# Patient Record
Sex: FEMALE | Race: WHITE | NOT HISPANIC OR LATINO | Employment: OTHER | URBAN - METROPOLITAN AREA
[De-identification: names, ages, dates, MRNs, and addresses within clinical notes are randomized per-mention and may not be internally consistent; named-entity substitution may affect disease eponyms.]

---

## 2017-01-31 ENCOUNTER — GENERIC CONVERSION - ENCOUNTER (OUTPATIENT)
Dept: OTHER | Facility: OTHER | Age: 82
End: 2017-01-31

## 2017-02-08 ENCOUNTER — GENERIC CONVERSION - ENCOUNTER (OUTPATIENT)
Dept: OTHER | Facility: OTHER | Age: 82
End: 2017-02-08

## 2017-02-21 ENCOUNTER — TRANSCRIBE ORDERS (OUTPATIENT)
Dept: RADIOLOGY | Facility: HOSPITAL | Age: 82
End: 2017-02-21

## 2017-02-21 ENCOUNTER — HOSPITAL ENCOUNTER (OUTPATIENT)
Dept: RADIOLOGY | Facility: HOSPITAL | Age: 82
Discharge: HOME | End: 2017-02-21
Payer: MEDICARE

## 2017-02-21 ENCOUNTER — ALLSCRIPTS OFFICE VISIT (OUTPATIENT)
Dept: OTHER | Facility: OTHER | Age: 82
End: 2017-02-21

## 2017-02-21 DIAGNOSIS — R50.9 FEVER: ICD-10-CM

## 2017-02-21 DIAGNOSIS — R05.9 COUGH: ICD-10-CM

## 2017-02-21 LAB
FLUAV AG SPEC QL IA: POSITIVE
INFLUENZA B AG (HISTORICAL): NEGATIVE

## 2017-02-21 PROCEDURE — 71020 HB CHEST X-RAY 2VW FRONTAL&LATL: CPT

## 2017-02-22 ENCOUNTER — GENERIC CONVERSION - ENCOUNTER (OUTPATIENT)
Dept: OTHER | Facility: OTHER | Age: 82
End: 2017-02-22

## 2017-03-06 ENCOUNTER — ALLSCRIPTS OFFICE VISIT (OUTPATIENT)
Dept: OTHER | Facility: OTHER | Age: 82
End: 2017-03-06

## 2017-03-20 ENCOUNTER — ALLSCRIPTS OFFICE VISIT (OUTPATIENT)
Dept: OTHER | Facility: OTHER | Age: 82
End: 2017-03-20

## 2017-05-13 ENCOUNTER — HOSPITAL ENCOUNTER (EMERGENCY)
Facility: HOSPITAL | Age: 82
Discharge: HOME | End: 2017-05-13
Attending: EMERGENCY MEDICINE
Payer: MEDICARE

## 2017-05-13 ENCOUNTER — GENERIC CONVERSION - ENCOUNTER (OUTPATIENT)
Dept: OTHER | Facility: OTHER | Age: 82
End: 2017-05-13

## 2017-05-13 VITALS
WEIGHT: 150 LBS | TEMPERATURE: 98 F | SYSTOLIC BLOOD PRESSURE: 197 MMHG | DIASTOLIC BLOOD PRESSURE: 82 MMHG | HEART RATE: 63 BPM | HEIGHT: 61 IN | RESPIRATION RATE: 18 BRPM | BODY MASS INDEX: 28.32 KG/M2 | OXYGEN SATURATION: 99 %

## 2017-05-13 DIAGNOSIS — I10 HYPERTENSION: Primary | ICD-10-CM

## 2017-05-13 LAB
ANION GAP SERPL CALCULATED.3IONS-SCNC: 8 MMOL/L (ref 4–13)
ATRIAL RATE: 73 BPM
BUN SERPL-MCNC: 20 MG/DL (ref 5–25)
CALCIUM SERPL-MCNC: 8.8 MG/DL (ref 8.3–10.1)
CHLORIDE SERPL-SCNC: 109 MMOL/L (ref 100–108)
CO2 SERPL-SCNC: 25 MMOL/L (ref 21–32)
CREAT SERPL-MCNC: 0.89 MG/DL (ref 0.6–1.3)
GFR SERPL CREATININE-BSD FRML MDRD: 59.5 ML/MIN/1.73SQ M
GLUCOSE SERPL-MCNC: 94 MG/DL (ref 65–140)
P AXIS: 58 DEGREES
POTASSIUM SERPL-SCNC: 3.6 MMOL/L (ref 3.5–5.3)
PR INTERVAL: 164 MS
QRS AXIS: -37 DEGREES
QRSD INTERVAL: 78 MS
QT INTERVAL: 408 MS
QTC INTERVAL: 449 MS
SODIUM SERPL-SCNC: 142 MMOL/L (ref 136–145)
T WAVE AXIS: 30 DEGREES
VENTRICULAR RATE: 73 BPM

## 2017-05-13 PROCEDURE — 93005 ELECTROCARDIOGRAM TRACING: CPT | Performed by: EMERGENCY MEDICINE

## 2017-05-13 PROCEDURE — 36415 COLL VENOUS BLD VENIPUNCTURE: CPT | Performed by: EMERGENCY MEDICINE

## 2017-05-13 PROCEDURE — 99282 EMERGENCY DEPT VISIT SF MDM: CPT

## 2017-05-13 PROCEDURE — 80048 BASIC METABOLIC PNL TOTAL CA: CPT | Performed by: EMERGENCY MEDICINE

## 2017-05-13 RX ORDER — LISINOPRIL 20 MG/1
20 TABLET ORAL ONCE
Status: COMPLETED | OUTPATIENT
Start: 2017-05-13 | End: 2017-05-13

## 2017-05-13 RX ADMIN — LISINOPRIL 20 MG: 20 TABLET ORAL at 17:02

## 2017-05-15 ENCOUNTER — ALLSCRIPTS OFFICE VISIT (OUTPATIENT)
Dept: OTHER | Facility: OTHER | Age: 82
End: 2017-05-15

## 2017-05-15 DIAGNOSIS — I10 ESSENTIAL (PRIMARY) HYPERTENSION: ICD-10-CM

## 2017-05-15 DIAGNOSIS — R53.83 OTHER FATIGUE: ICD-10-CM

## 2017-09-25 ENCOUNTER — ALLSCRIPTS OFFICE VISIT (OUTPATIENT)
Dept: OTHER | Facility: OTHER | Age: 82
End: 2017-09-25

## 2017-09-25 ENCOUNTER — TRANSCRIBE ORDERS (OUTPATIENT)
Dept: LAB | Facility: HOSPITAL | Age: 82
End: 2017-09-25

## 2017-09-25 ENCOUNTER — GENERIC CONVERSION - ENCOUNTER (OUTPATIENT)
Dept: OTHER | Facility: OTHER | Age: 82
End: 2017-09-25

## 2017-09-25 ENCOUNTER — APPOINTMENT (OUTPATIENT)
Dept: LAB | Facility: HOSPITAL | Age: 82
End: 2017-09-25
Payer: MEDICARE

## 2017-09-25 DIAGNOSIS — E78.5 HYPERLIPIDEMIA: ICD-10-CM

## 2017-09-25 DIAGNOSIS — E55.9 VITAMIN D DEFICIENCY: ICD-10-CM

## 2017-09-25 DIAGNOSIS — R73.01 IMPAIRED FASTING GLUCOSE: ICD-10-CM

## 2017-09-25 DIAGNOSIS — M85.80 OTHER SPECIFIED DISORDERS OF BONE DENSITY AND STRUCTURE, UNSPECIFIED SITE: ICD-10-CM

## 2017-09-25 DIAGNOSIS — I10 ESSENTIAL (PRIMARY) HYPERTENSION: ICD-10-CM

## 2017-09-25 LAB
25(OH)D3 SERPL-MCNC: 34.4 NG/ML (ref 30–100)
ALBUMIN SERPL BCP-MCNC: 3.3 G/DL (ref 3.5–5)
ALP SERPL-CCNC: 99 U/L (ref 46–116)
ALT SERPL W P-5'-P-CCNC: 18 U/L (ref 12–78)
ANION GAP SERPL CALCULATED.3IONS-SCNC: 5 MMOL/L (ref 4–13)
AST SERPL W P-5'-P-CCNC: 21 U/L (ref 5–45)
BASOPHILS # BLD AUTO: 0.03 THOUSANDS/ΜL (ref 0–0.1)
BASOPHILS NFR BLD AUTO: 0 % (ref 0–1)
BILIRUB SERPL-MCNC: 0.8 MG/DL (ref 0.2–1)
BUN SERPL-MCNC: 19 MG/DL (ref 5–25)
CALCIUM SERPL-MCNC: 8.8 MG/DL (ref 8.3–10.1)
CHLORIDE SERPL-SCNC: 110 MMOL/L (ref 100–108)
CO2 SERPL-SCNC: 27 MMOL/L (ref 21–32)
CREAT SERPL-MCNC: 0.92 MG/DL (ref 0.6–1.3)
EOSINOPHIL # BLD AUTO: 0.12 THOUSAND/ΜL (ref 0–0.61)
EOSINOPHIL NFR BLD AUTO: 2 % (ref 0–6)
ERYTHROCYTE [DISTWIDTH] IN BLOOD BY AUTOMATED COUNT: 12.8 % (ref 11.6–15.1)
GFR SERPL CREATININE-BSD FRML MDRD: 55 ML/MIN/1.73SQ M
GLUCOSE SERPL-MCNC: 84 MG/DL (ref 65–140)
HCT VFR BLD AUTO: 42.3 % (ref 34.8–46.1)
HGB BLD-MCNC: 14 G/DL (ref 11.5–15.4)
LYMPHOCYTES # BLD AUTO: 1.61 THOUSANDS/ΜL (ref 0.6–4.47)
LYMPHOCYTES NFR BLD AUTO: 24 % (ref 14–44)
MCH RBC QN AUTO: 33.8 PG (ref 26.8–34.3)
MCHC RBC AUTO-ENTMCNC: 33.1 G/DL (ref 31.4–37.4)
MCV RBC AUTO: 102 FL (ref 82–98)
MONOCYTES # BLD AUTO: 0.37 THOUSAND/ΜL (ref 0.17–1.22)
MONOCYTES NFR BLD AUTO: 6 % (ref 4–12)
NEUTROPHILS # BLD AUTO: 4.57 THOUSANDS/ΜL (ref 1.85–7.62)
NEUTS SEG NFR BLD AUTO: 68 % (ref 43–75)
NRBC BLD AUTO-RTO: 0 /100 WBCS
PLATELET # BLD AUTO: 328 THOUSANDS/UL (ref 149–390)
PMV BLD AUTO: 9.5 FL (ref 8.9–12.7)
POTASSIUM SERPL-SCNC: 4.3 MMOL/L (ref 3.5–5.3)
PROT SERPL-MCNC: 6.5 G/DL (ref 6.4–8.2)
RBC # BLD AUTO: 4.14 MILLION/UL (ref 3.81–5.12)
SODIUM SERPL-SCNC: 142 MMOL/L (ref 136–145)
TSH SERPL DL<=0.05 MIU/L-ACNC: 1.75 UIU/ML (ref 0.36–3.74)
WBC # BLD AUTO: 6.71 THOUSAND/UL (ref 4.31–10.16)

## 2017-09-25 PROCEDURE — 82306 VITAMIN D 25 HYDROXY: CPT

## 2017-09-25 PROCEDURE — 84443 ASSAY THYROID STIM HORMONE: CPT

## 2017-09-25 PROCEDURE — 36415 COLL VENOUS BLD VENIPUNCTURE: CPT

## 2017-09-25 PROCEDURE — 80053 COMPREHEN METABOLIC PANEL: CPT

## 2017-09-25 PROCEDURE — 85025 COMPLETE CBC W/AUTO DIFF WBC: CPT

## 2017-10-24 ENCOUNTER — GENERIC CONVERSION - ENCOUNTER (OUTPATIENT)
Dept: FAMILY MEDICINE CLINIC | Facility: CLINIC | Age: 82
End: 2017-10-24

## 2017-12-09 ENCOUNTER — APPOINTMENT (EMERGENCY)
Dept: RADIOLOGY | Facility: HOSPITAL | Age: 82
DRG: 871 | End: 2017-12-09
Payer: MEDICARE

## 2017-12-09 ENCOUNTER — GENERIC CONVERSION - ENCOUNTER (OUTPATIENT)
Dept: CARDIOLOGY CLINIC | Facility: CLINIC | Age: 82
End: 2017-12-09

## 2017-12-09 ENCOUNTER — HOSPITAL ENCOUNTER (INPATIENT)
Facility: HOSPITAL | Age: 82
LOS: 1 days | Discharge: HOME | DRG: 871 | End: 2017-12-11
Attending: EMERGENCY MEDICINE | Admitting: INTERNAL MEDICINE
Payer: MEDICARE

## 2017-12-09 DIAGNOSIS — I48.91 NEW ONSET ATRIAL FIBRILLATION (HCC): ICD-10-CM

## 2017-12-09 DIAGNOSIS — J18.9 COMMUNITY ACQUIRED PNEUMONIA OF RIGHT UPPER LOBE OF LUNG: Primary | ICD-10-CM

## 2017-12-09 PROBLEM — R07.81 PLEURITIC CHEST PAIN: Status: ACTIVE | Noted: 2017-12-09

## 2017-12-09 PROBLEM — I31.3 PERICARDIAL EFFUSION: Status: ACTIVE | Noted: 2017-12-09

## 2017-12-09 PROBLEM — I31.39 PERICARDIAL EFFUSION: Status: ACTIVE | Noted: 2017-12-09

## 2017-12-09 PROBLEM — I10 HYPERTENSION: Status: ACTIVE | Noted: 2017-12-09

## 2017-12-09 PROBLEM — N17.9 AKI (ACUTE KIDNEY INJURY) (HCC): Status: ACTIVE | Noted: 2017-12-09

## 2017-12-09 PROBLEM — A41.9 SEPSIS (HCC): Status: ACTIVE | Noted: 2017-12-09

## 2017-12-09 LAB
ANION GAP SERPL CALCULATED.3IONS-SCNC: 9 MMOL/L (ref 4–13)
ANISOCYTOSIS BLD QL SMEAR: PRESENT
BASOPHILS # BLD MANUAL: 0.32 THOUSAND/UL (ref 0–0.1)
BASOPHILS NFR MAR MANUAL: 2 % (ref 0–1)
BUN SERPL-MCNC: 22 MG/DL (ref 5–25)
CALCIUM SERPL-MCNC: 9.3 MG/DL (ref 8.3–10.1)
CHLORIDE SERPL-SCNC: 108 MMOL/L (ref 100–108)
CO2 SERPL-SCNC: 25 MMOL/L (ref 21–32)
CREAT SERPL-MCNC: 1.14 MG/DL (ref 0.6–1.3)
DEPRECATED D DIMER PPP: 2412 NG/ML (FEU) (ref 0–424)
EOSINOPHIL # BLD MANUAL: 0 THOUSAND/UL (ref 0–0.4)
EOSINOPHIL NFR BLD MANUAL: 0 % (ref 0–6)
ERYTHROCYTE [DISTWIDTH] IN BLOOD BY AUTOMATED COUNT: 13 % (ref 11.6–15.1)
GFR SERPL CREATININE-BSD FRML MDRD: 42 ML/MIN/1.73SQ M
GLUCOSE SERPL-MCNC: 115 MG/DL (ref 65–140)
HCT VFR BLD AUTO: 42.3 % (ref 34.8–46.1)
HGB BLD-MCNC: 14.6 G/DL (ref 11.5–15.4)
LACTATE SERPL-SCNC: 1.2 MMOL/L (ref 0.5–2)
LYMPHOCYTES # BLD AUTO: 0 % (ref 14–44)
LYMPHOCYTES # BLD AUTO: 0 THOUSAND/UL (ref 0.6–4.47)
MACROCYTES BLD QL AUTO: PRESENT
MCH RBC QN AUTO: 34.4 PG (ref 26.8–34.3)
MCHC RBC AUTO-ENTMCNC: 34.5 G/DL (ref 31.4–37.4)
MCV RBC AUTO: 100 FL (ref 82–98)
MONOCYTES # BLD AUTO: 0.8 THOUSAND/UL (ref 0–1.22)
MONOCYTES NFR BLD: 5 % (ref 4–12)
NEUTROPHILS # BLD MANUAL: 14.92 THOUSAND/UL (ref 1.85–7.62)
NEUTS SEG NFR BLD AUTO: 93 % (ref 43–75)
NRBC BLD AUTO-RTO: 0 /100 WBCS
PLATELET # BLD AUTO: 274 THOUSANDS/UL (ref 149–390)
PLATELET # BLD AUTO: 283 THOUSANDS/UL (ref 149–390)
PLATELET BLD QL SMEAR: ADEQUATE
PMV BLD AUTO: 9.1 FL (ref 8.9–12.7)
PMV BLD AUTO: 9.1 FL (ref 8.9–12.7)
POTASSIUM SERPL-SCNC: 4.2 MMOL/L (ref 3.5–5.3)
RBC # BLD AUTO: 4.24 MILLION/UL (ref 3.81–5.12)
RBC MORPH BLD: PRESENT
SODIUM SERPL-SCNC: 142 MMOL/L (ref 136–145)
SPECIMEN SOURCE: NORMAL
TROPONIN I BLD-MCNC: 0 NG/ML (ref 0–0.08)
TROPONIN I SERPL-MCNC: <0.02 NG/ML
WBC # BLD AUTO: 16.04 THOUSAND/UL (ref 4.31–10.16)

## 2017-12-09 PROCEDURE — 96360 HYDRATION IV INFUSION INIT: CPT

## 2017-12-09 PROCEDURE — 83605 ASSAY OF LACTIC ACID: CPT | Performed by: EMERGENCY MEDICINE

## 2017-12-09 PROCEDURE — 36415 COLL VENOUS BLD VENIPUNCTURE: CPT | Performed by: EMERGENCY MEDICINE

## 2017-12-09 PROCEDURE — 99285 EMERGENCY DEPT VISIT HI MDM: CPT

## 2017-12-09 PROCEDURE — 71020 HB CHEST X-RAY 2VW FRONTAL&LATL: CPT

## 2017-12-09 PROCEDURE — 71275 CT ANGIOGRAPHY CHEST: CPT

## 2017-12-09 PROCEDURE — 84484 ASSAY OF TROPONIN QUANT: CPT

## 2017-12-09 PROCEDURE — 93005 ELECTROCARDIOGRAM TRACING: CPT | Performed by: EMERGENCY MEDICINE

## 2017-12-09 PROCEDURE — 80048 BASIC METABOLIC PNL TOTAL CA: CPT | Performed by: EMERGENCY MEDICINE

## 2017-12-09 PROCEDURE — 87040 BLOOD CULTURE FOR BACTERIA: CPT | Performed by: EMERGENCY MEDICINE

## 2017-12-09 PROCEDURE — 84484 ASSAY OF TROPONIN QUANT: CPT | Performed by: PHYSICIAN ASSISTANT

## 2017-12-09 PROCEDURE — 85379 FIBRIN DEGRADATION QUANT: CPT | Performed by: EMERGENCY MEDICINE

## 2017-12-09 PROCEDURE — 85049 AUTOMATED PLATELET COUNT: CPT | Performed by: PHYSICIAN ASSISTANT

## 2017-12-09 PROCEDURE — 85027 COMPLETE CBC AUTOMATED: CPT | Performed by: EMERGENCY MEDICINE

## 2017-12-09 PROCEDURE — 85007 BL SMEAR W/DIFF WBC COUNT: CPT | Performed by: EMERGENCY MEDICINE

## 2017-12-09 RX ORDER — HEPARIN SODIUM 5000 [USP'U]/ML
5000 INJECTION, SOLUTION INTRAVENOUS; SUBCUTANEOUS EVERY 8 HOURS SCHEDULED
Status: DISCONTINUED | OUTPATIENT
Start: 2017-12-09 | End: 2017-12-11 | Stop reason: HOSPADM

## 2017-12-09 RX ORDER — AZITHROMYCIN 250 MG/1
250 TABLET, FILM COATED ORAL EVERY 24 HOURS
Status: DISCONTINUED | OUTPATIENT
Start: 2017-12-10 | End: 2017-12-11

## 2017-12-09 RX ORDER — SODIUM CHLORIDE 9 MG/ML
100 INJECTION, SOLUTION INTRAVENOUS CONTINUOUS
Status: DISCONTINUED | OUTPATIENT
Start: 2017-12-09 | End: 2017-12-10

## 2017-12-09 RX ORDER — ACETAMINOPHEN 325 MG/1
975 TABLET ORAL EVERY 8 HOURS SCHEDULED
Status: DISCONTINUED | OUTPATIENT
Start: 2017-12-09 | End: 2017-12-11 | Stop reason: HOSPADM

## 2017-12-09 RX ORDER — CALCIUM CARBONATE 200(500)MG
1000 TABLET,CHEWABLE ORAL DAILY PRN
Status: DISCONTINUED | OUTPATIENT
Start: 2017-12-09 | End: 2017-12-11 | Stop reason: HOSPADM

## 2017-12-09 RX ORDER — LISINOPRIL 20 MG/1
20 TABLET ORAL DAILY
COMMUNITY
End: 2018-01-31 | Stop reason: ALTCHOICE

## 2017-12-09 RX ORDER — OMEGA-3-ACID ETHYL ESTERS 1 G/1
2 CAPSULE, LIQUID FILLED ORAL 2 TIMES DAILY
COMMUNITY
End: 2019-10-24 | Stop reason: ALTCHOICE

## 2017-12-09 RX ORDER — DOCUSATE SODIUM 100 MG/1
100 CAPSULE, LIQUID FILLED ORAL 2 TIMES DAILY
Status: DISCONTINUED | OUTPATIENT
Start: 2017-12-09 | End: 2017-12-11 | Stop reason: HOSPADM

## 2017-12-09 RX ORDER — ONDANSETRON 2 MG/ML
4 INJECTION INTRAMUSCULAR; INTRAVENOUS EVERY 6 HOURS PRN
Status: DISCONTINUED | OUTPATIENT
Start: 2017-12-09 | End: 2017-12-11 | Stop reason: HOSPADM

## 2017-12-09 RX ORDER — CHLORAL HYDRATE 500 MG
1000 CAPSULE ORAL DAILY
Status: DISCONTINUED | OUTPATIENT
Start: 2017-12-10 | End: 2017-12-11 | Stop reason: HOSPADM

## 2017-12-09 RX ADMIN — HEPARIN SODIUM 5000 UNITS: 5000 INJECTION, SOLUTION INTRAVENOUS; SUBCUTANEOUS at 22:53

## 2017-12-09 RX ADMIN — CEFTRIAXONE 1000 MG: 1 INJECTION, SOLUTION INTRAVENOUS at 19:14

## 2017-12-09 RX ADMIN — AZITHROMYCIN MONOHYDRATE 500 MG: 500 INJECTION, POWDER, LYOPHILIZED, FOR SOLUTION INTRAVENOUS at 20:05

## 2017-12-09 RX ADMIN — ACETAMINOPHEN 975 MG: 325 TABLET, FILM COATED ORAL at 22:53

## 2017-12-09 RX ADMIN — SODIUM CHLORIDE 1000 ML: 0.9 INJECTION, SOLUTION INTRAVENOUS at 17:32

## 2017-12-09 RX ADMIN — IOHEXOL 70 ML: 350 INJECTION, SOLUTION INTRAVENOUS at 17:54

## 2017-12-09 RX ADMIN — BIMATOPROST 1 DROP: 0.1 SOLUTION/ DROPS OPHTHALMIC at 22:59

## 2017-12-09 RX ADMIN — SODIUM CHLORIDE 100 ML/HR: 0.9 INJECTION, SOLUTION INTRAVENOUS at 22:53

## 2017-12-09 RX ADMIN — DOCUSATE SODIUM 100 MG: 100 CAPSULE, LIQUID FILLED ORAL at 22:53

## 2017-12-09 NOTE — ED ATTENDING ATTESTATION
Windy Machado MD, saw and evaluated the patient  I have discussed the patient with the resident/non-physician practitioner and agree with the resident's/non-physician practitioner's findings, Plan of Care, and MDM as documented in the resident's/non-physician practitioner's note, except where noted  All available labs and Radiology studies were reviewed  At this point I agree with the current assessment done in the Emergency Department  I have conducted an independent evaluation of this patient a history and physical is as follows: This is a 24-year-old woman who presents to the emergency department with pleuritic chest pain  The patient states that her pain began yesterday  She states it only hurts when she takes a deep breath  She states she has not had cough, fevers, or shortness of breath  She does not have chest pain when she is not taking a deep breath  She had symptoms like this 1 year ago, and came to the hospital, but does not remember what they told her  The patient has a history of hypercholesterolemia and hypertension  She does not believe that she has ever had a stress test   She has never had an MI  The patient has no personal history of malignancy, family history of thromboembolic disease, lateralizing leg swelling, recent travel, recent surgery, or leg pain  Her review of systems is otherwise negative in 12 systems were reviewed  On exam Vital signs were reviewed  Patient is awake, alert, interactive  The patient's pupils are equally round reactive to light  Oropharynx is clear with moist mucous membranes  Neck is supple and nontender with no adenopathy or JVD  Heart is regular with no murmurs, rubs, or gallops  Lungs are clear and equal with no wheezes, rales, or rhonchi  Abdomen is soft and nontender with no masses, rebound, or guarding  There is no CVA tenderness  The patient was completely exposed  There is no skin breakdown    There are no rashes or skin changes  Extremities are warm and well perfused with good pulses  The patient has normal strength, sensation, and cranial nerves  Patient's EKG was reviewed by me and shows sinus rhythm with no ischemia or ectopy  Impression:  Atypical chest pain  Patient has a heart score of 3  Will perform delta troponin, shared decision making  Additionally, pleuritic pain mildly suspicious for pulmonary embolism, although patient is low risk and has no risk factors  She denies shortness of breath  However, given her age, we will plan to do a D-dimer and use age adjusted D-dimer levels to rule out pulmonary embolus      Critical Care Time  CritCare Time

## 2017-12-09 NOTE — ED PROVIDER NOTES
History  Chief Complaint   Patient presents with    Chest Pain     Pt c/o chest pain upon taking deep breath and trouble breathing which started yesterday     80year-old with history of hypertension, hyperlipidemia presents for evaluation of chest discomfort  Patient reports having 2 days of discomfort in her chest with deep inspiration  Denies having any overt chest pain or shortness of breath  Denies any dyspnea with exertion, fever, chills, diaphoresis or vomiting  Reports having the symptoms about 1 year ago which resolved on their own  Denies any history of recent travel, surgeries or unilateral swelling in her lower extremities  Prior to Admission Medications   Prescriptions Last Dose Informant Patient Reported? Taking? Multiple Vitamins-Minerals (ICAPS MV PO)   Yes Yes   Sig: Take by mouth   lisinopril (ZESTRIL) 20 mg tablet   Yes Yes   Sig: Take 20 mg by mouth daily   omega-3-acid ethyl esters (LOVAZA) 1 g capsule   Yes Yes   Sig: Take 2 g by mouth 2 (two) times a day      Facility-Administered Medications: None       Past Medical History:   Diagnosis Date    Hyperlipidemia     Hypertension     Macular degeneration        Past Surgical History:   Procedure Laterality Date    HYSTERECTOMY         History reviewed  No pertinent family history  I have reviewed and agree with the history as documented  Social History   Substance Use Topics    Smoking status: Never Smoker    Smokeless tobacco: Never Used    Alcohol use Yes        Review of Systems   Constitutional: Negative for chills and fever  HENT: Negative for congestion and sore throat  Eyes: Negative for pain and redness  Respiratory: Negative for shortness of breath and wheezing  Cardiovascular: Positive for chest pain  Negative for palpitations  Gastrointestinal: Negative for abdominal pain, diarrhea and vomiting  Endocrine: Negative for polydipsia and polyphagia     Genitourinary: Negative for dysuria and flank pain    Musculoskeletal: Negative for arthralgias and back pain  Skin: Negative for rash and wound  Neurological: Negative for seizures and headaches  Psychiatric/Behavioral: Negative for agitation and behavioral problems  All other systems reviewed and are negative  Physical Exam  ED Triage Vitals [12/09/17 1549]   Temperature Pulse Respirations Blood Pressure SpO2   (!) 95 5 °F (35 3 °C) 88 18 129/60 100 %      Temp Source Heart Rate Source Patient Position - Orthostatic VS BP Location FiO2 (%)   Tympanic Monitor Sitting Left arm --      Pain Score       7           Orthostatic Vital Signs  Vitals:    12/09/17 1549 12/09/17 1615 12/09/17 1729 12/09/17 1838   BP: 129/60 (!) 129/105 (!) 129/105 132/61   Pulse: 88 83 78 78   Patient Position - Orthostatic VS: Sitting Lying Lying Lying       Physical Exam   Constitutional: She is oriented to person, place, and time  She appears well-developed and well-nourished  HENT:   Head: Normocephalic and atraumatic  Right Ear: External ear normal    Left Ear: External ear normal    Mouth/Throat: Oropharynx is clear and moist    Eyes: EOM are normal  Pupils are equal, round, and reactive to light  Neck: Normal range of motion  Cardiovascular: Normal rate, regular rhythm and normal heart sounds  Exam reveals no friction rub  No murmur heard  Pulmonary/Chest: Effort normal  No respiratory distress  She has no wheezes  Abdominal: Soft  Bowel sounds are normal  She exhibits no distension  There is no tenderness  There is no rebound and no guarding  Musculoskeletal: Normal range of motion  She exhibits no edema  Neurological: She is alert and oriented to person, place, and time  No cranial nerve deficit  Coordination normal    Skin: Skin is warm  No erythema  Psychiatric: She has a normal mood and affect  Her behavior is normal    Nursing note and vitals reviewed        ED Medications  Medications   sodium chloride 0 9 % bolus 1,000 mL (1,000 mL Intravenous New Bag 12/9/17 1732)   azithromycin (ZITHROMAX) 500 mg in sodium chloride 0 9% 250mL IVPB 500 mg (not administered)   cefTRIAXone (ROCEPHIN) IVPB (premix) 1,000 mg (not administered)   iohexol (OMNIPAQUE) 350 MG/ML injection (MULTI-DOSE) 85 mL (70 mL Intravenous Given 12/9/17 9963)       Diagnostic Studies  Results Reviewed     Procedure Component Value Units Date/Time    Lactic acid, plasma [[id number]]     Lab Status:  No result Specimen:  Blood     Blood culture #1 [[id number]]     Lab Status:  No result Specimen:  Blood     Blood culture #2 [[id number]]     Lab Status:  No result Specimen:  Blood     Basic metabolic panel [[id number]] Collected:  12/09/17 1620    Lab Status:  Final result Specimen:  Blood from Arm, Left Updated:  12/09/17 1716     Sodium 142 mmol/L      Potassium 4 2 mmol/L      Chloride 108 mmol/L      CO2 25 mmol/L      Anion Gap 9 mmol/L      BUN 22 mg/dL      Creatinine 1 14 mg/dL      Glucose 115 mg/dL      Calcium 9 3 mg/dL      eGFR 42 ml/min/1 73sq m     Narrative:         National Kidney Disease Education Program recommendations are as follows:  GFR calculation is accurate only with a steady state creatinine  Chronic Kidney disease less than 60 ml/min/1 73 sq  meters  Kidney failure less than 15 ml/min/1 73 sq  meters  CBC and differential [[id number]]  (Abnormal) Collected:  12/09/17 1620    Lab Status:  Final result Specimen:  Blood from Arm, Left Updated:  12/09/17 1706     WBC 16 04 (H) Thousand/uL      RBC 4 24 Million/uL      Hemoglobin 14 6 g/dL      Hematocrit 42 3 %       (H) fL      MCH 34 4 (H) pg      MCHC 34 5 g/dL      RDW 13 0 %      MPV 9 1 fL      Platelets 234 Thousands/uL      nRBC 0 /100 WBCs     Narrative: This is an appended report  These results have been appended to a previously verified report      D-Dimer [[id number]]  (Abnormal) Collected:  12/09/17 1620    Lab Status:  Final result Specimen:  Blood from Arm, Left Updated:  12/09/17 1486 D-Dimer, Quant 2,412 (H) ng/ml (FEU)     POCT troponin [[id number]]  (Normal) Collected:  12/09/17 1625    Lab Status:  Final result Updated:  12/09/17 1639     POC Troponin I 0 00 ng/ml      Specimen Type VENOUS    Narrative:         [employer] i-Stat handheld analyzer 99% cutoff is > 0 08ng/mL in network Emergency Departments    o cTnI 99% cutoff is useful only when applied to patients in the clinical setting of myocardial ischemia  o cTnI 99% cutoff should be interpreted in the context of clinical history, ECG findings and possibly cardiac imaging to establish correct diagnosis  o cTnI 99% cutoff may be suggestive but clearly not indicative of a coronary event without the clinical setting of myocardial ischemia  CTA ED chest PE study   Final Result by Kyle Melendez MD (12/09 1835)      1  No evidence of pulmonary embolism  2   Small to moderate-sized pericardial effusion  Trace left pleural effusion  3  Mild patchy opacity in the right upper lobe and mild patchy consolidation at the left lung base which may represent atelectasis or infiltrate  Mild opacification of the bronchioles in the right lower lobe  4   Multiple pulmonary nodules more numerous in the upper lobes  The largest measures up to 1 cm  Based on current Fleischner Society 2017 Guidelines on incidental pulmonary nodule, either PET/CT scan evaluation, tissue sampling or short term interval    followup non-contrast CT followup (initially in 3 months) may be considered appropriate           Workstation performed: [id number]         XR chest 2 views   ED Interpretation by Romulo Bhagat MD (12/09 0259)   No acute findings            Procedures  ECG 12 Lead Documentation  Date/Time: 12/9/2017 4:10 PM  Performed by: Bridgett Cuff  Authorized by: Luann Giron     Indications / Diagnosis:  Chest pain  ECG reviewed by me, the ED Provider: yes    Patient location:  ED  Previous ECG:     Previous ECG:  Compared to current Comparison ECG info:  5/2017    Similarity:  No change    Comparison to cardiac monitor: Yes    Interpretation:     Interpretation: normal    Rate:     ECG rate:  91    ECG rate assessment: normal    Rhythm:     Rhythm: sinus rhythm    Ectopy:     Ectopy: none    QRS:     QRS axis:  Normal    QRS intervals:  Normal  Conduction:     Conduction: normal    ST segments:     ST segments:  Normal  T waves:     T waves: normal            Phone Consults  ED Phone Contact    ED Course  ED Course          HEART Risk Score    Flowsheet Row Most Recent Value   History  0 Filed at: 12/09/2017 1607   ECG  0 Filed at: 12/09/2017 1607   Age  2 Filed at: 12/09/2017 1607   Risk Factors  1 Filed at: 12/09/2017 1607   Troponin  0 Filed at: 12/09/2017 1607   Heart Score Risk Calculator   History  0 Filed at: 12/09/2017 1607   ECG  0 Filed at: 12/09/2017 1607   Age  2 Filed at: 12/09/2017 1607   Risk Factors  1 Filed at: 12/09/2017 1607   Troponin  0 Filed at: 12/09/2017 1607   HEART Score  3 Filed at: 12/09/2017 1607   HEART Score  3 Filed at: 12/09/2017 1607        Identification of Seniors at [address] [place] [address] Most Recent Value   (ISAR) Identification of Seniors at Risk   Before the illness or injury that brought you to the Emergency, did you need someone to help you on a regular basis? 0 Filed at: 12/09/2017 1551   In the last 24 hours, have you needed more help than usual?  0 Filed at: 12/09/2017 1551   Have you been hospitalized for one or more nights during the past 6 months? 0 Filed at: 12/09/2017 1551   In general, do you see well? 1 Filed at: 12/09/2017 1551   In general, do you have serious problems with your memory?   0 Filed at: 12/09/2017 1551   Do you take more than three different medications every day?  0 Filed at: 12/09/2017 1551   ISAR Score  1 Filed at: 12/09/2017 1551                          MDM  Number of Diagnoses or Management Options  Diagnosis management comments: Impression:  Chest discomfort with deep inspiration  Unlikely ACS, low risk for PE  Plan:  Cardiac evaluation with delta troponin and D-dimer, age adjusted    CritCare Time    Disposition  Final diagnoses:   Community acquired pneumonia of right upper lobe of lung ([address] )     Time reflects when diagnosis was documented in both MDM as applicable and the Disposition within this note     Time User Action Codes Description Comment    12/9/2017  7:01 PM Sherine Bagley Add [J18 1] Community acquired pneumonia of right upper lobe of lung [facility])       ED Disposition     ED Disposition Condition Comment    Admit  Case was discussed with JV and the patient's admission status was agreed to be Admission Status: observation status to the service of Dr Dat Cox   Follow-up Information    None       Patient's Medications   Discharge Prescriptions    No medications on file     No discharge procedures on file  ED Provider  Attending physically available and evaluated García Perez I managed the patient along with the ED Attending      Electronically Signed by         Frantz Oropeza MD  Resident  12/09/17 7369

## 2017-12-10 ENCOUNTER — GENERIC CONVERSION - ENCOUNTER (OUTPATIENT)
Dept: CARDIOLOGY CLINIC | Facility: CLINIC | Age: 82
End: 2017-12-10

## 2017-12-10 ENCOUNTER — APPOINTMENT (OUTPATIENT)
Dept: NON INVASIVE DIAGNOSTICS | Facility: HOSPITAL | Age: 82
DRG: 871 | End: 2017-12-10
Payer: MEDICARE

## 2017-12-10 LAB
ANION GAP SERPL CALCULATED.3IONS-SCNC: 6 MMOL/L (ref 4–13)
ATRIAL RATE: 66 BPM
ATRIAL RATE: 91 BPM
BASOPHILS # BLD AUTO: 0.01 THOUSANDS/ΜL (ref 0–0.1)
BASOPHILS NFR BLD AUTO: 0 % (ref 0–1)
BUN SERPL-MCNC: 22 MG/DL (ref 5–25)
CALCIUM SERPL-MCNC: 8.1 MG/DL (ref 8.3–10.1)
CHLORIDE SERPL-SCNC: 112 MMOL/L (ref 100–108)
CO2 SERPL-SCNC: 24 MMOL/L (ref 21–32)
CREAT SERPL-MCNC: 0.94 MG/DL (ref 0.6–1.3)
EOSINOPHIL # BLD AUTO: 0.02 THOUSAND/ΜL (ref 0–0.61)
EOSINOPHIL NFR BLD AUTO: 0 % (ref 0–6)
ERYTHROCYTE [DISTWIDTH] IN BLOOD BY AUTOMATED COUNT: 13.3 % (ref 11.6–15.1)
GFR SERPL CREATININE-BSD FRML MDRD: 53 ML/MIN/1.73SQ M
GLUCOSE P FAST SERPL-MCNC: 89 MG/DL (ref 65–99)
GLUCOSE SERPL-MCNC: 89 MG/DL (ref 65–140)
HCT VFR BLD AUTO: 34.1 % (ref 34.8–46.1)
HGB BLD-MCNC: 11.6 G/DL (ref 11.5–15.4)
L PNEUMO1 AG UR QL IA.RAPID: NEGATIVE
LYMPHOCYTES # BLD AUTO: 1.26 THOUSANDS/ΜL (ref 0.6–4.47)
LYMPHOCYTES NFR BLD AUTO: 14 % (ref 14–44)
MCH RBC QN AUTO: 34 PG (ref 26.8–34.3)
MCHC RBC AUTO-ENTMCNC: 34 G/DL (ref 31.4–37.4)
MCV RBC AUTO: 100 FL (ref 82–98)
MONOCYTES # BLD AUTO: 0.8 THOUSAND/ΜL (ref 0.17–1.22)
MONOCYTES NFR BLD AUTO: 9 % (ref 4–12)
NEUTROPHILS # BLD AUTO: 6.72 THOUSANDS/ΜL (ref 1.85–7.62)
NEUTS SEG NFR BLD AUTO: 77 % (ref 43–75)
NRBC BLD AUTO-RTO: 0 /100 WBCS
P AXIS: 42 DEGREES
PLATELET # BLD AUTO: 227 THOUSANDS/UL (ref 149–390)
PMV BLD AUTO: 9 FL (ref 8.9–12.7)
POTASSIUM SERPL-SCNC: 4 MMOL/L (ref 3.5–5.3)
PR INTERVAL: 148 MS
QRS AXIS: 1 DEGREES
QRS AXIS: 6 DEGREES
QRSD INTERVAL: 72 MS
QRSD INTERVAL: 92 MS
QT INTERVAL: 284 MS
QT INTERVAL: 432 MS
QTC INTERVAL: 409 MS
QTC INTERVAL: 452 MS
RBC # BLD AUTO: 3.41 MILLION/UL (ref 3.81–5.12)
S PNEUM AG UR QL: NEGATIVE
SODIUM SERPL-SCNC: 142 MMOL/L (ref 136–145)
T WAVE AXIS: 44 DEGREES
T WAVE AXIS: 52 DEGREES
TROPONIN I SERPL-MCNC: <0.02 NG/ML
VENTRICULAR RATE: 125 BPM
VENTRICULAR RATE: 66 BPM
WBC # BLD AUTO: 8.84 THOUSAND/UL (ref 4.31–10.16)

## 2017-12-10 PROCEDURE — 93005 ELECTROCARDIOGRAM TRACING: CPT | Performed by: PHYSICIAN ASSISTANT

## 2017-12-10 PROCEDURE — 93005 ELECTROCARDIOGRAM TRACING: CPT

## 2017-12-10 PROCEDURE — 85025 COMPLETE CBC W/AUTO DIFF WBC: CPT | Performed by: PHYSICIAN ASSISTANT

## 2017-12-10 PROCEDURE — 87449 NOS EACH ORGANISM AG IA: CPT | Performed by: PHYSICIAN ASSISTANT

## 2017-12-10 PROCEDURE — 80048 BASIC METABOLIC PNL TOTAL CA: CPT | Performed by: PHYSICIAN ASSISTANT

## 2017-12-10 PROCEDURE — 93306 TTE W/DOPPLER COMPLETE: CPT

## 2017-12-10 PROCEDURE — 84484 ASSAY OF TROPONIN QUANT: CPT | Performed by: PHYSICIAN ASSISTANT

## 2017-12-10 RX ORDER — METOPROLOL TARTRATE 5 MG/5ML
5 INJECTION INTRAVENOUS EVERY 6 HOURS PRN
Status: DISCONTINUED | OUTPATIENT
Start: 2017-12-10 | End: 2017-12-11 | Stop reason: HOSPADM

## 2017-12-10 RX ORDER — ASPIRIN 81 MG/1
81 TABLET, CHEWABLE ORAL DAILY
Status: DISCONTINUED | OUTPATIENT
Start: 2017-12-11 | End: 2017-12-11 | Stop reason: HOSPADM

## 2017-12-10 RX ORDER — METOPROLOL TARTRATE 5 MG/5ML
5 INJECTION INTRAVENOUS ONCE
Status: COMPLETED | OUTPATIENT
Start: 2017-12-10 | End: 2017-12-10

## 2017-12-10 RX ADMIN — HEPARIN SODIUM 5000 UNITS: 5000 INJECTION, SOLUTION INTRAVENOUS; SUBCUTANEOUS at 06:43

## 2017-12-10 RX ADMIN — CEFTRIAXONE 1000 MG: 1 INJECTION, SOLUTION INTRAVENOUS at 21:57

## 2017-12-10 RX ADMIN — ACETAMINOPHEN 975 MG: 325 TABLET, FILM COATED ORAL at 08:35

## 2017-12-10 RX ADMIN — BIMATOPROST 1 DROP: 0.1 SOLUTION/ DROPS OPHTHALMIC at 21:55

## 2017-12-10 RX ADMIN — Medication 1000 MG: at 08:30

## 2017-12-10 RX ADMIN — ACETAMINOPHEN 975 MG: 325 TABLET, FILM COATED ORAL at 21:37

## 2017-12-10 RX ADMIN — AZITHROMYCIN 250 MG: 250 TABLET, FILM COATED ORAL at 21:56

## 2017-12-10 RX ADMIN — DOCUSATE SODIUM 100 MG: 100 CAPSULE, LIQUID FILLED ORAL at 08:31

## 2017-12-10 RX ADMIN — SODIUM CHLORIDE 100 ML/HR: 0.9 INJECTION, SOLUTION INTRAVENOUS at 18:16

## 2017-12-10 RX ADMIN — HEPARIN SODIUM 5000 UNITS: 5000 INJECTION, SOLUTION INTRAVENOUS; SUBCUTANEOUS at 21:38

## 2017-12-10 RX ADMIN — METOROPROLOL TARTRATE 5 MG: 5 INJECTION, SOLUTION INTRAVENOUS at 20:51

## 2017-12-10 RX ADMIN — HEPARIN SODIUM 5000 UNITS: 5000 INJECTION, SOLUTION INTRAVENOUS; SUBCUTANEOUS at 15:00

## 2017-12-10 RX ADMIN — SODIUM CHLORIDE 100 ML/HR: 0.9 INJECTION, SOLUTION INTRAVENOUS at 08:29

## 2017-12-10 RX ADMIN — Medication 1 TABLET: at 08:31

## 2017-12-10 NOTE — CASE MANAGEMENT
Initial Clinical Review    Admission: Date/Time/Statement: 12/9/17 @ 1903 -- OBS -- changed to IP 12/10 @     12/10/17 0854  Inpatient Admission Once     Transfer Service: General Medicine       Question Answer Comment   Admitting Physician CHUCKY FATIMA    Level of Care Med Surg    Estimated length of stay More than 2 Midnights    Certification I certify that inpatient services are medically necessary for this patient for a duration of greater than two midnights  See H&P and MD Progress Notes for additional information about the patient's course of treatment  12/10/17 0853       Orders Placed This Encounter   Procedures    Place in Observation (expected length of stay for this patient is less than two midnights)     Standing Status:   Standing     Number of Occurrences:   1     Order Specific Question:   Admitting Physician     Answer:   Eder Miguel [[id number]]     Order Specific Question:   Level of Care     Answer:   Med Surg [16]       ED: Date/Time/Mode of Arrival:   ED Arrival Information     Expected Arrival Acuity Means of Arrival Escorted By Service Admission Type    - 12/9/2017 15:44 Emergent Walk-In Self General Medicine Emergency    Arrival Complaint    chest pain          Chief Complaint:   Chief Complaint   Patient presents with    Chest Pain     Pt c/o chest pain upon taking deep breath and trouble breathing which started yesterday       History of Illness:  80 y o  female with history of hypertension and hyperlipidemia who presents with substernal chest pain beginning today  Pain sharp and only present with deep breaths  Occasionally radiates to the back  She denies SOB, cough, fever/chills  She is an otherwise healthy 79 yo  Lives at home with her  and is quite active  Lisinopril is the only prescription medication she takes   No cardiac problems       ED Vital Signs:   ED Triage Vitals [12/09/17 1549]   Temperature Pulse Respirations Blood Pressure SpO2   (!) 95 5 °F (35 3 °C) 88 18 129/60 100 %      Temp Source Heart Rate Source Patient Position - Orthostatic VS BP Location FiO2 (%)   Tympanic Monitor Sitting Left arm --      Pain Score       7        Wt Readings from Last 1 Encounters:   12/09/17 59 kg (130 lb)       Vital Signs:  12/09 0701  12/10 0700 12/10 0701  12/10 0843  Most Recent    Temperature (°F) 95  598 4 98 6  98 6 (37)   Pulse 6488 72  72   Respirations 1822 18  18   Blood Pressure 102/52132/61 131/58  131/58   SpO2 (%) 95100 97  97       Abnormal Labs/Diagnostic Test Results: WBC 16 04  CXR -- Small left pleural effusion, new from the prior study  No active pulmonary disease  CT chest --  1  No evidence of pulmonary embolism  2   Small to moderate-sized pericardial effusion  Trace left pleural effusion  3  Mild patchy opacity in the right upper lobe and mild patchy consolidation at the left lung base which may represent atelectasis or infiltrate  Mild opacification of the bronchioles in the right lower lobe  4   Multiple pulmonary nodules more numerous in the upper lobes  The largest measures up to 1 cm      EKG -- NSR, no ST elevations    ED Treatment:   Medication Administration from 12/09/2017 1544 to 12/09/2017 2119       Date/Time Order Dose Route Action Action by Comments                12/09/2017 1732 sodium chloride 0 9 % bolus 1,000 mL 1,000 mL Intravenous [address] Jass Raymond RN      12/09/2017 1754 iohexol (OMNIPAQUE) 350 MG/ML injection (MULTI-DOSE) 85 mL 70 mL Intravenous Given Sivan Cline      12/09/2017 2005 azithromycin (ZITHROMAX) 500 mg in sodium chloride 0 9% 250mL IVPB 500 mg 500 mg Intravenous New Bag Latoya Reinoso RN                 12/09/2017 1914 cefTRIAXone (ROCEPHIN) IVPB (premix) 1,000 mg 1,000 mg Intravenous New Bag Jass Raymond RN           Past Medical/Surgical History:   Past Medical History:   Diagnosis Date    Hyperlipidemia     Hypertension     Macular degeneration        Admitting Diagnosis: Chest pain [R07 9]  Community acquired pneumonia of right upper lobe of lung ([facility] ) [J18 1]    Age/Sex: 80 y o  female    Assessment/Plan:   Hospital Problem List:      Principal Problem:    Sepsis ([facility] )  Active Problems:    CAP (community acquired pneumonia)    Pleuritic chest pain    Pericardial effusion    ZECHARIAH (acute kidney injury) ([facility] )    Hypertension        Plan for the Primary Problem(s):  · Sepsis, POA, suspect secondary to CAP:  ? Hypothermia and leukocytosis  ? CT scan with evidence of pneumonia  ? Check urine strep/legionella, sputum culture  ? Non-toxic appearing  Hemodynamically stable  ? Rocephin and azithromycin  ? F/U blood cultures  · Pleuritic chest pain:  ? CTA negative for PE  ? Consider secondary to pneumonia  ? CT showed small to moderate sized pericardial effusion  Consider the possibility of pericarditis  EKG without ST elevation  Repeat EKG in the AM  ? Troponins  · Pericardial effusion:  ? Will obtain echo to better evaluate  · ZECHARIAH:  ? IVF hydration  ? Monitor renal function given exposure to contrast  ? Hold nephrotoxins     Plan for Additional Problems:   · HTN:  ? Hold lisinopril due to ZECHARIAH  · HLD:  ? Continue fish oil  · Pulmonary nodules:  ? Noted on CT  ? F/U CT 3 months     VTE Prophylaxis: Heparin  / sequential compression device   Code Status: Full code  POLST: There is no POLST form on file for this patient (pre-hospital)     Anticipated Length of Stay:  Patient will be admitted on an Observation basis with an anticipated length of stay of  < 2 midnights     Justification for Hospital Stay: Pneumonia, chest pain workup       Admission Orders:  M/S/Tele unit  Telem  VS & Pox q4h  Serial troponins  Echo  Cardiac diet  venodynes b/l le  Up & oob as tolerated      Scheduled Meds:   acetaminophen 975 mg Oral Q8H [address]   cefTRIAXone 1,000 mg Intravenous Q24H   And      azithromycin 250 mg Oral Q24H   bimatoprost 1 drop Right Eye HS   docusate sodium 100 mg Oral BID   fish oil 1,000 mg Oral Daily heparin (porcine) 5,000 Units Subcutaneous Q8H [address]   multivitamin-minerals 1 tablet Oral Daily     Continuous Infusions:   sodium chloride 100 mL/hr Last Rate: 100 mL/hr (12/10/17 0829)     PRN Meds: calcium carbonate    ondansetron      12/10 --  Assessment:   Principal Problem:    Sepsis ([facility] )  Active Problems:    CAP (community acquired pneumonia)    Pleuritic chest pain    Pericardial effusion    ZECHARIAH (acute kidney injury) ([facility] )    Hypertension        Plan:     · Multilobar pneumonia  § Continue with antibiotic therapy  § Fluid resuscitation  § Monitor electrolytes  · Pericardial effusion-echocardiogram is pending  Follow up on results  Blood pressure and heart rate stable  · Pleuritic chest pain  Likely secondary to pneumonia  No evidence of pulmonary embolism  CT negative  · Acute renal failure creatinine is stable  · Hypertension-lisinopril was on hold due to renal failure  Will restart within next 24 to 48 hours if creatinine continues to be stable  · Hyperlipidemia-fish oil  · Pulmonary nodules-CT in 3 months        VTE Pharmacologic Prophylaxis:   Pharmacologic: Heparin  Mechanical VTE Prophylaxis in Place:  Yes     Certification Statement: The patient will continue to require additional inpatient hospital stay due to Need to monitor infection

## 2017-12-10 NOTE — PROGRESS NOTES
12/10/17 0827    Physical Exam:     Physical Exam   Constitutional: She is oriented to person, place, and time  She appears well-developed and well-nourished  HENT:   Head: Normocephalic and atraumatic  Eyes: Conjunctivae are normal  Pupils are equal, round, and reactive to light  Neck: Normal range of motion  Neck supple  No JVD present  No tracheal deviation present  No thyromegaly present  Cardiovascular: Normal rate and regular rhythm  No murmur heard  Pulmonary/Chest: Effort normal and breath sounds normal  No respiratory distress  She has no wheezes  Abdominal: Soft  Bowel sounds are normal  She exhibits no distension  Musculoskeletal: Normal range of motion  She exhibits no edema  Neurological: She is alert and oriented to person, place, and time  No cranial nerve deficit  Skin: Skin is warm and dry  No erythema  Psychiatric: She has a normal mood and affect  Additional Data:     Labs:      Results from last 7 days  Lab Units 12/10/17  0500   WBC Thousand/uL 8 84   HEMOGLOBIN g/dL 11 6   HEMATOCRIT % 34 1*   PLATELETS Thousands/uL 227   NEUTROS PCT % 77*   LYMPHS PCT % 14   MONOS PCT % 9   EOS PCT % 0       Results from last 7 days  Lab Units 12/10/17  0500   SODIUM mmol/L 142   POTASSIUM mmol/L 4 0   CHLORIDE mmol/L 112*   CO2 mmol/L 24   BUN mg/dL 22   CREATININE mg/dL 0 94   CALCIUM mg/dL 8 1*   GLUCOSE RANDOM mg/dL 89           * I Have Reviewed All Lab Data Listed Above  * Additional Pertinent Lab Tests Reviewed:  All Labs Within Last 24 Hours Reviewed      Recent Cultures (last 7 days):           Last 24 Hours Medication List:     acetaminophen 975 mg Oral Q8H [address]   cefTRIAXone 1,000 mg Intravenous Q24H   And      azithromycin 250 mg Oral Q24H   bimatoprost 1 drop Right Eye HS   docusate sodium 100 mg Oral BID   fish oil 1,000 mg Oral Daily   heparin (porcine) 5,000 Units Subcutaneous Q8H [address]   multivitamin-minerals 1 tablet Oral Daily        Today, Patient Was Seen By: Sarah Andrade, DO    ** Please Note: This note has been constructed using a voice recognition system   **

## 2017-12-10 NOTE — H&P
History and Physical - [facility] Internal Medicine    Patient Information: Fatmata Roth 80 y o  female MRN: [id number]  Unit/Bed#: ED 06 Encounter: [id number]  Admitting Physician: Rolanda Comer PA-C  PCP: Nathaniel Venegas MD  Date of Admission:  12/09/17    Assessment/Plan:    Hospital Problem List:     Principal Problem:    Sepsis [facility])  Active Problems:    CAP (community acquired pneumonia)    Pleuritic chest pain    Pericardial effusion    ZECHARIAH (acute kidney injury) ([facility] [address] )    Hypertension      Plan for the Primary Problem(s):  · Sepsis, POA, suspect secondary to CAP:  · Hypothermia and leukocytosis  · CT scan with evidence of pneumonia  · Check urine strep/legionella, sputum culture  · Non-toxic appearing  Hemodynamically stable  · Rocephin and azithromycin  · F/U blood cultures  · Pleuritic chest pain:  · CTA negative for PE  · Consider secondary to pneumonia  · CT showed small to moderate sized pericardial effusion  Consider the possibility of pericarditis  EKG without ST elevation  Repeat EKG in the AM  · Troponins  · Pericardial effusion:  · Will obtain echo to better evaluate  · ZECHARIAH:  · IVF hydration  · Monitor renal function given exposure to contrast  · Hold nephrotoxins    Plan for Additional Problems:   · HTN:  · Hold lisinopril due to ZECHARIAH  · HLD:  · Continue fish oil  · Pulmonary nodules:  · Noted on CT  · F/U CT 3 months    VTE Prophylaxis: Heparin  / sequential compression device   Code Status: Full code  POLST: There is no POLST form on file for this patient (pre-hospital)    Anticipated Length of Stay:  Patient will be admitted on an Observation basis with an anticipated length of stay of  < 2 midnights  Justification for Hospital Stay: Pneumonia, chest pain workup    Total Time for Visit, including Counseling / Coordination of Care: 45 minutes  Greater than 50% of this total time spent on direct patient counseling and coordination of care      Chief Complaint:   Chest pain    History of Present Illness: Bina Saez is a 80 y o  female with history of hypertension and hyperlipidemia who presents with substernal chest pain beginning today  Pain sharp and only present with deep breaths  Occasionally radiates to the back  She denies SOB, cough, fever/chills  She is an otherwise healthy 81 yo  Lives at home with her  and is quite active  Lisinopril is the only prescription medication she takes  No cardiac problems  Patient had an elevated d-dimer and so CTA of the chest was performed which was negative for PE but did show pneumonia  Also showed small to moderate pericardial effusion, trace left pleural effusion, and multiple pulmonary nodules  She met sepsis criteria on admission given hypothermia (T 95 5) and leukocytosis (16 04)  Creatinine also slightly elevated on admission  Review of Systems:    Review of Systems   Constitutional: Negative for chills and fever  HENT: Negative  Eyes: Negative  Respiratory: Negative for cough and shortness of breath  Cardiovascular: Positive for chest pain  Gastrointestinal: Negative  Endocrine: Negative  Genitourinary: Negative  Musculoskeletal: Negative  Skin: Negative  Allergic/Immunologic: Negative  Neurological: Negative  Hematological: Negative  Psychiatric/Behavioral: Negative  Past Medical and Surgical History:     Past Medical History:   Diagnosis Date    Hyperlipidemia     Hypertension     Macular degeneration        Past Surgical History:   Procedure Laterality Date    HYSTERECTOMY         Meds/Allergies:    Prior to Admission medications    Medication Sig Start Date End Date Taking?  Authorizing Provider   lisinopril (ZESTRIL) 20 mg tablet Take 20 mg by mouth daily   Yes Historical Provider, MD   Multiple Vitamins-Minerals (ICAPS MV PO) Take by mouth   Yes Historical Provider, MD   omega-3-acid ethyl esters (LOVAZA) 1 g capsule Take 2 g by mouth 2 (two) times a day   Yes Historical Provider, MD I have reviewed home medications with patient personally  Allergies: No Known Allergies    Social History:     Marital Status: /Civil Union   Occupation: None  Patient Pre-hospital Living Situation: Lives at home  Patient Pre-hospital Level of Mobility: Ambulates independently  Patient Pre-hospital Diet Restrictions: None  Substance Use History:   History   Alcohol Use    Yes     History   Smoking Status    Never Smoker   Smokeless Tobacco    Never Used     History   Drug Use No       Family History:    non-contributory    Physical Exam:     Vitals:   Blood Pressure: 122/60 (12/09/17 1930)  Pulse: 82 (12/09/17 1930)  Temperature: (!) 95 5 °F (35 3 °C) (12/09/17 1549)  Temp Source: Tympanic (12/09/17 1549)  Respirations: 18 (12/09/17 1930)  Height: 5' 1" (154 9 cm) (12/09/17 1549)  Weight - Scale: 59 kg (130 lb) (12/09/17 1549)  SpO2: 98 % (12/09/17 1930)    Physical Exam   Constitutional: She is oriented to person, place, and time  No distress  Non-toxic appearing   HENT:   Head: Normocephalic and atraumatic  Hard of hearing   Eyes: No scleral icterus  Neck: Normal range of motion  Neck supple  Cardiovascular: Normal rate, regular rhythm, normal heart sounds and intact distal pulses  Pulmonary/Chest: Effort normal and breath sounds normal  No respiratory distress  She has no wheezes  She has no rales  Abdominal: Soft  Bowel sounds are normal  She exhibits no distension  There is no tenderness  There is no rebound  Musculoskeletal: She exhibits no edema  Neurological: She is alert and oriented to person, place, and time  Skin: Skin is warm and dry  She is not diaphoretic  Psychiatric: She has a normal mood and affect  Her behavior is normal  Thought content normal      Additional Data:     Lab Results: I have personally reviewed pertinent reports          Results from last 7 days  Lab Units 12/09/17  1620   WBC Thousand/uL 16 04*   HEMOGLOBIN g/dL 14 6   HEMATOCRIT % 42 3 PLATELETS Thousands/uL 283   LYMPHO PCT % 0*   MONO PCT MAN % 5   EOSINO PCT MANUAL % 0       Results from last 7 days  Lab Units 12/09/17  1620   SODIUM mmol/L 142   POTASSIUM mmol/L 4 2   CHLORIDE mmol/L 108   CO2 mmol/L 25   BUN mg/dL 22   CREATININE mg/dL 1 14   CALCIUM mg/dL 9 3   GLUCOSE RANDOM mg/dL 115           Imaging: I have personally reviewed pertinent reports  Cta Ed Chest Pe Study    Result Date: 12/9/2017  Narrative: CTA - CHEST WITH IV CONTRAST - PULMONARY ANGIOGRAM INDICATION: Chest pain  Elevated d-dimer  COMPARISON: Chest x-ray from 12/9/2017 TECHNIQUE: CTA examination of the chest was performed using angiographic technique according to a protocol specifically tailored to evaluate for pulmonary embolism  Reformatted images were created in axial, sagittal, and coronal planes  In addition, coronal 3D MIP postprocessing was performed on the acquisition scanner  Radiation dose length product (DLP) for this visit:  303 56 mGy-cm   This examination, like all CT scans performed in the [facility], was performed utilizing techniques to minimize radiation dose exposure, including the use of iterative  reconstruction and automated exposure control  IV Contrast:  70 mL of iohexol (OMNIPAQUE)      FINDINGS: PULMONARY ARTERIAL TREE:  No pulmonary embolus is seen  LUNGS:  Multiple bilateral pulmonary nodules more numerous in the upper lobes  These are mostly 2 to 3 mm in size, see for example images 10 to 19   1 cm pulmonary nodule in the left upper lobe medially, image 17 series 3   4 mm nodule in the left lower  lobe laterally, image 30  Mild opacification of the bronchioles in the right lower lobe  Mild patchy opacity in the right upper lobe posteriorly may be related to atelectasis or infiltrate  Dependent atelectatic change bilaterally  Mild patchy consolidation in the left lower lobe  PLEURA:  Trace left pleural effusion   HEART/AORTA:  Small to moderate-sized pericardial effusion  Heart is moderately enlarged  Scattered coronary artery calcifications  Thoracic aorta is normal caliber  MEDIASTINUM AND JULIO:  Calcified lymph node in the right peribronchial region  CHEST WALL AND LOWER NECK:       Normal  VISUALIZED STRUCTURES IN THE UPPER ABDOMEN:  Partially visualized are small stones in the gallbladder  OSSEOUS STRUCTURES:  No acute fracture or destructive osseous lesion  Vertebral body hemangioma identified at the T9 and T11 levels  Impression: 1  No evidence of pulmonary embolism  2   Small to moderate-sized pericardial effusion  Trace left pleural effusion  3  Mild patchy opacity in the right upper lobe and mild patchy consolidation at the left lung base which may represent atelectasis or infiltrate  Mild opacification of the bronchioles in the right lower lobe  4   Multiple pulmonary nodules more numerous in the upper lobes  The largest measures up to 1 cm  Based on current Fleischner Society 2017 Guidelines on incidental pulmonary nodule, either PET/CT scan evaluation, tissue sampling or short term interval followup non-contrast CT followup (initially in 3 months) may be considered appropriate  Workstation performed: [id number]       EKG, Pathology, and Other Studies Reviewed on Admission:   · EKG: Reviewed  Normal sinus rhythm  No ST elevations    Allscripts / Epic Records Reviewed: Yes     ** Please Note: This note has been constructed using a voice recognition system   **

## 2017-12-11 ENCOUNTER — GENERIC CONVERSION - ENCOUNTER (OUTPATIENT)
Dept: CARDIOLOGY CLINIC | Facility: CLINIC | Age: 82
End: 2017-12-11

## 2017-12-11 VITALS
SYSTOLIC BLOOD PRESSURE: 152 MMHG | DIASTOLIC BLOOD PRESSURE: 66 MMHG | HEIGHT: 61 IN | RESPIRATION RATE: 18 BRPM | WEIGHT: 130 LBS | OXYGEN SATURATION: 99 % | BODY MASS INDEX: 24.55 KG/M2 | TEMPERATURE: 97.5 F | HEART RATE: 72 BPM

## 2017-12-11 LAB
ANION GAP SERPL CALCULATED.3IONS-SCNC: 6 MMOL/L (ref 4–13)
ATRIAL RATE: 70 BPM
ATRIAL RATE: 91 BPM
ATRIAL RATE: 91 BPM
BASOPHILS # BLD AUTO: 0.01 THOUSANDS/ΜL (ref 0–0.1)
BASOPHILS NFR BLD AUTO: 0 % (ref 0–1)
BUN SERPL-MCNC: 18 MG/DL (ref 5–25)
CALCIUM SERPL-MCNC: 8.8 MG/DL (ref 8.3–10.1)
CHLORIDE SERPL-SCNC: 114 MMOL/L (ref 100–108)
CO2 SERPL-SCNC: 24 MMOL/L (ref 21–32)
CREAT SERPL-MCNC: 0.88 MG/DL (ref 0.6–1.3)
EOSINOPHIL # BLD AUTO: 0.03 THOUSAND/ΜL (ref 0–0.61)
EOSINOPHIL NFR BLD AUTO: 0 % (ref 0–6)
ERYTHROCYTE [DISTWIDTH] IN BLOOD BY AUTOMATED COUNT: 13.4 % (ref 11.6–15.1)
GFR SERPL CREATININE-BSD FRML MDRD: 58 ML/MIN/1.73SQ M
GLUCOSE SERPL-MCNC: 86 MG/DL (ref 65–140)
HCT VFR BLD AUTO: 35.5 % (ref 34.8–46.1)
HGB BLD-MCNC: 12.1 G/DL (ref 11.5–15.4)
LYMPHOCYTES # BLD AUTO: 0.77 THOUSANDS/ΜL (ref 0.6–4.47)
LYMPHOCYTES NFR BLD AUTO: 10 % (ref 14–44)
MCH RBC QN AUTO: 34.2 PG (ref 26.8–34.3)
MCHC RBC AUTO-ENTMCNC: 34.1 G/DL (ref 31.4–37.4)
MCV RBC AUTO: 100 FL (ref 82–98)
MONOCYTES # BLD AUTO: 0.63 THOUSAND/ΜL (ref 0.17–1.22)
MONOCYTES NFR BLD AUTO: 8 % (ref 4–12)
NEUTROPHILS # BLD AUTO: 6.12 THOUSANDS/ΜL (ref 1.85–7.62)
NEUTS SEG NFR BLD AUTO: 82 % (ref 43–75)
NRBC BLD AUTO-RTO: 0 /100 WBCS
P AXIS: 45 DEGREES
P AXIS: 45 DEGREES
PLATELET # BLD AUTO: 248 THOUSANDS/UL (ref 149–390)
PMV BLD AUTO: 9.4 FL (ref 8.9–12.7)
POTASSIUM SERPL-SCNC: 4 MMOL/L (ref 3.5–5.3)
PR INTERVAL: 142 MS
PR INTERVAL: 152 MS
QRS AXIS: -19 DEGREES
QRS AXIS: 15 DEGREES
QRS AXIS: 18 DEGREES
QRSD INTERVAL: 70 MS
QRSD INTERVAL: 72 MS
QRSD INTERVAL: 90 MS
QT INTERVAL: 320 MS
QT INTERVAL: 384 MS
QT INTERVAL: 414 MS
QTC INTERVAL: 447 MS
QTC INTERVAL: 472 MS
QTC INTERVAL: 484 MS
RBC # BLD AUTO: 3.54 MILLION/UL (ref 3.81–5.12)
SODIUM SERPL-SCNC: 144 MMOL/L (ref 136–145)
T WAVE AXIS: 54 DEGREES
T WAVE AXIS: 56 DEGREES
T WAVE AXIS: 65 DEGREES
TROPONIN I SERPL-MCNC: <0.02 NG/ML
TROPONIN I SERPL-MCNC: <0.02 NG/ML
TSH SERPL DL<=0.05 MIU/L-ACNC: 1.56 UIU/ML (ref 0.36–3.74)
VENTRICULAR RATE: 138 BPM
VENTRICULAR RATE: 70 BPM
VENTRICULAR RATE: 91 BPM
WBC # BLD AUTO: 7.58 THOUSAND/UL (ref 4.31–10.16)

## 2017-12-11 PROCEDURE — 84443 ASSAY THYROID STIM HORMONE: CPT | Performed by: NURSE PRACTITIONER

## 2017-12-11 PROCEDURE — 85025 COMPLETE CBC W/AUTO DIFF WBC: CPT | Performed by: INTERNAL MEDICINE

## 2017-12-11 PROCEDURE — 80048 BASIC METABOLIC PNL TOTAL CA: CPT | Performed by: INTERNAL MEDICINE

## 2017-12-11 PROCEDURE — 84484 ASSAY OF TROPONIN QUANT: CPT | Performed by: PHYSICIAN ASSISTANT

## 2017-12-11 PROCEDURE — 93005 ELECTROCARDIOGRAM TRACING: CPT

## 2017-12-11 RX ORDER — CEFUROXIME AXETIL 250 MG/1
500 TABLET ORAL EVERY 12 HOURS SCHEDULED
Status: DISCONTINUED | OUTPATIENT
Start: 2017-12-11 | End: 2017-12-11 | Stop reason: HOSPADM

## 2017-12-11 RX ORDER — CEFUROXIME AXETIL 500 MG/1
500 TABLET ORAL EVERY 12 HOURS SCHEDULED
Qty: 9 TABLET | Refills: 0 | Status: SHIPPED | OUTPATIENT
Start: 2017-12-11 | End: 2017-12-16

## 2017-12-11 RX ORDER — ASPIRIN 81 MG/1
81 TABLET, CHEWABLE ORAL DAILY
Qty: 30 TABLET | Refills: 0 | Status: SHIPPED | OUTPATIENT
Start: 2017-12-12 | End: 2018-10-04 | Stop reason: ALTCHOICE

## 2017-12-11 RX ADMIN — HEPARIN SODIUM 5000 UNITS: 5000 INJECTION, SOLUTION INTRAVENOUS; SUBCUTANEOUS at 06:49

## 2017-12-11 RX ADMIN — METOPROLOL TARTRATE 12.5 MG: 25 TABLET ORAL at 11:34

## 2017-12-11 RX ADMIN — ACETAMINOPHEN 975 MG: 325 TABLET, FILM COATED ORAL at 06:49

## 2017-12-11 RX ADMIN — CEFUROXIME AXETIL 500 MG: 250 TABLET ORAL at 13:05

## 2017-12-11 RX ADMIN — Medication 1 TABLET: at 09:06

## 2017-12-11 RX ADMIN — Medication 1000 MG: at 09:06

## 2017-12-11 RX ADMIN — ASPIRIN 81 MG 81 MG: 81 TABLET ORAL at 09:06

## 2017-12-11 NOTE — SOCIAL WORK
CM met with patient,explained CM role with introduction  Patient lives with   In [address] [facility]  With [address] and full flight of stairs between floors  Washer and dryer are in the basement  There is a bathroom in the basement and on the second floor  Patient independent  PTA, including driving and household chores  Patient has no prior DME, HHC or inpatient rehab experience  PCP is Dr Ann Marie Dennis  Patient has a prescription plan and uses [facility]  Patient['s sister in law Charly Murrieta is POA and primary  [phone]  Charly Murrieta lives next door to patient  Patient has no children  CM reviewed d/c planning process including the following: identifying help at home, patient preference for d/c planning needs, Discharge Nohemi Ratel to Bed program,; patient prefers [address], scripts and facesheet tubed to [address]; availability of treatment team to discuss questions or concerns patient and/or family may have regarding understanding medications and recognizing signs and symptoms once discharged  CM also encouraged patient to follow up with all recommended appointments after discharge  Patient advised of importance for patient and family to participate in managing patients medical well being  CM will follow for discharge needs

## 2017-12-11 NOTE — SIGNIFICANT EVENT
Notified by RN that there appeared to be ST elevations on telemetry  EKG obtained  VSS  EKG appears unchanged from previous EKG on 12/10/17  Normal sinus now without Afib  Patient is asymptomatic and resting comfortably  Denies chest pain, shortness of breath, palpitations  Examination reveals lungs CTA, heart RRR, abdomen soft non-tender active bowel sounds  Initial troponin negative  Continue telemetry monitoring and will order additional troponin

## 2017-12-11 NOTE — INCIDENTAL FINDINGS
The following findings require follow up:  Radiographic finding   Finding: Multiple pulmonary nodules more numerous in the upper lobes  The largest measures up to 1 cm  Follow up required: Based on current Fleischner Society 2017 Guidelines on incidental pulmonary nodule, either PET/CT scan evaluation, tissue sampling or short term interval followup non-contrast CT followup (initially in 3 months) may be considered appropriate     Follow up should be done within 3 month(s)    Please notify the following clinician to assist with the follow up:   Dr Kyle Gould

## 2017-12-11 NOTE — PROGRESS NOTES
Notified by RN that patient feeling anxious and found to have HR in 130s  Came to evaluate patient at bedside  Stat EKG revealing afib with RVR  No prior history of this, may be secondary to underlying pulmonary/cardiac pathology  Will give 1 time dose of IV metoprolol 5mg now  CHADSvASC = 4(Age, female, HTN)  Will ask cards to evaluate in AM  Continue to monitor closely  Add metoprolol prn  Start asa  All other vitals stable, on room air  Reports episode of palpitations w/ anxiety lasting a few seconds and this has now resolved  Still with pleuritic chest pain unchanged from prior  On exam lungs are clear, HR irregularly irregular, tachycardic  ambulating in room independently  C/w tele

## 2017-12-11 NOTE — PROGRESS NOTES
Patient's telemetry monitor appears to show some ST elevation, EKG done, Ila Fabry with [facility] made aware  She will be down to assess patient  Patient currently feels "fine" and has no questions or concerns  Will continue to monitor

## 2017-12-11 NOTE — DISCHARGE SUMMARY
Discharge Summary - [facility]    Patient Information: Venecia Griffith 80 y o  female MRN: [id number]  Unit/Bed#: -01 Encounter: [id number]    Discharging Physician / Practitioner: Mandeep Naidu PA-C  PCP: Ruth Shelton MD  Admission Date: 12/9/2017  Discharge Date: 12/11/17    Reason for Admission: chest pain     Discharge Diagnoses:     Principal Problem:    Sepsis [facility])  Active Problems:    CAP (community acquired pneumonia)    Pleuritic chest pain    Pericardial effusion    ZECHARIAH (acute kidney injury) ([facility] [address] )    Hypertension  Resolved Problems:    * No resolved hospital problems  *      Consultations During Hospital Stay:  · Cardiology Dr Sam Guerrero    Procedures Performed:     · None     Significant Findings / Test Results:     · Sepsis POA suspect 2/2 CAP (hypothermia and leukocytosis)  · CXR - negative   · CTA chest - 1  No evidence of pulmonary embolism  2   Small to moderate-sized pericardial effusion  Trace left pleural effusion  3  Mild patchy opacity in the right upper lobe and mild patchy consolidation at the left lung base which may represent atelectasis or infiltrate  Mild opacification of the bronchioles in the right lower lobe  4   Multiple pulmonary nodules more numerous in the upper lobes  The largest measures up to 1 cm  Based on current Fleischner Society 2017 Guidelines on incidental pulmonary nodule, either PET/CT scan evaluation, tissue sampling or short term interval follow up non-contrast CT followup (initially in 3 months) may be considered appropriate  · Echocardiogram - LVEF 60%, G1DD, trace pericardial effusion no evidence of hemodynamic compromise  Normal atria  · New onset atrial fibrillation - now in NSR   · Neg strep legionella and and strep pneumoniae   · Pericardial effusion, small  · Pleuritic chest pain   · ZECHARIAH, POA , resolved   · TSH normal 1 560    Incidental Findings:   · Multiple pulmonary nodules more numerous in the upper lobes    The largest measures up to 1 cm  Based on current Fleischner Society 2017 Guidelines on incidental pulmonary nodule, either PET/CT scan evaluation, tissue sampling or short term interval followup non-contrast CT followup (initially in 3 months) may be considered appropriate  Test Results Pending at Discharge (will require follow up): · None     Outpatient Tests Requested:  · Follow up with PCP   · Follow up with cardiologist in January, possible event monitor   · Repeat CXR 4-6 weeks   · Follow up CT chest in 3 months     Complications:  None     Hospital Course: Jaison Coe is a 80 y o  female patient with PMH of hypertension who originally presented to the hospital on 12/9/2017 due to chest pain  Patient reports substernal chest pain that began the day of admission that was sharp and worse with deep breaths occasionally radiating to her back  Patient was found to have elevated D-dimer and she underwent CTA of the chest which was negative for pulmonary embolism but did show pneumonia  It also showed a small to moderate pericardial effusion and a trace left pleural effusion with multiple pulmonary nodules  Patient was hypothermic on admission with leukocytosis and sepsis criteria  Patient also had acute kidney injury  Patient was started on antibiotics including azithromycin and ceftriaxone for community-acquired pneumonia  Patient denied cough or sputum production  She did not experience any fevers or chills  Patient's chest pain has resolved prior to discharge  The night before discharge, patient was found to be in atrial fibrillation with rapid ventricular response  Patient converted to normal sinus rhythm with 5 mg of IV Lopressor  Cardiology was consulted  Patient was recommended to take Lopressor 12 5 mg twice daily instead of her lisinopril  She was recommended to take 81 mg aspirin daily in addition to rate control with beta-blocker  Patient had no prior history of atrial fibrillation    She was asymptomatic at the time  Atrial fibrillation was likely due to underlying pneumonia and trace pericardial effusion  Echocardiogram was performed with trace pericardial effusion without hemodynamic compromise  She was encouraged to follow up with Cardiology in the outpatient setting in January  Patient expressed understanding and agreed with plan  New prescriptions were provided  Patient and  expressed understanding and agree with plan for discharge home with close outpatient follow up  Condition at Discharge: stable     Discharge Day Visit / Exam:     Subjective:  Patient seen and examined at bedside  Patient ambulating in halls  She denies chest pain, sob, palpitations, cough, fever chills, n/v/d  State "i hope I can go home "    Vitals: Blood Pressure: 152/66 (12/11/17 1134)  Pulse: 72 (12/11/17 1134)  Temperature: 97 5 °F (36 4 °C) (12/11/17 1134)  Temp Source: Oral (12/11/17 1134)  Respirations: 18 (12/11/17 1134)  Height: 5' 1" (154 9 cm) (12/09/17 2136)  Weight - Scale: 59 kg (130 lb) (12/09/17 2136)  SpO2: 99 % (12/11/17 1134)    Exam:   Physical Exam   Constitutional: She is oriented to person, place, and time  She appears well-developed and well-nourished  No distress  HENT:   Head: Normocephalic and atraumatic  Mouth/Throat: Oropharynx is clear and moist    Eyes: EOM are normal  No scleral icterus  Neck: Normal range of motion  Cardiovascular: Normal rate, regular rhythm and normal heart sounds  No murmur heard  NSR   Pulmonary/Chest: Effort normal and breath sounds normal  No respiratory distress  She has no wheezes  She has no rales  Abdominal: Soft  Bowel sounds are normal    Musculoskeletal: Normal range of motion  She exhibits no edema  Ambulating freely in halls    Neurological: She is alert and oriented to person, place, and time  Skin: Skin is warm and dry  Psychiatric: She has a normal mood and affect  Nursing note and vitals reviewed        Discussion with Family:      Discharge instructions/Information to patient and family:   See after visit summary for information provided to patient and family  Provisions for Follow-Up Care:  See after visit summary for information related to follow-up care and any pertinent home health orders  Disposition:     Home    For Discharges to [facility] SNF:   · Not Applicable to this Patient - Not Applicable to this Patient    Planned Readmission: none     Discharge Statement:  I spent 35 minutes discharging the patient  This time was spent on the day of discharge  I had direct contact with the patient on the day of discharge  Greater than 50% of the total time was spent examining patient, answering all patient questions, arranging and discussing plan of care with patient as well as directly providing post-discharge instructions  Additional time then spent on discharge activities  Discharge Medications:  See after visit summary for reconciled discharge medications provided to patient and family        ** Please Note: This note has been constructed using a voice recognition system **

## 2017-12-11 NOTE — PHYSICIAN ADVISOR
Current patient class: Inpatient  The patient is currently on Hospital Day: 2      The patient was admitted to the hospital at [phone] on 12/10/17 for the following diagnosis:  Chest pain [R07 9]  Community acquired pneumonia of right upper lobe of lung ([address] ) [J18 1]       There is documentation in the medical record of an expected length of stay of at least 2 midnights  The patient is therefore expected to satisfy the 2 midnight benchmark and given the 2 midnight presumption is appropriate for INPATIENT ADMISSION  Given this expectation of a satisfying stay, CMS instructs us that the patient is most often appropriate for inpatient admission under part A provided medical necessity is documented in the chart  After review of the relevant documentation, labs, vital signs and test results, the patient is appropriate for INPATIENT ADMISSION  Admission to the hospital as an inpatient is a complex decision making process which requires the practitioner to consider the patients presenting complaint, history and physical examination and all relevant testing  With this in mind, in this case, the patient was deemed appropriate for INPATIENT ADMISSION  After review of the documentation and testing available at the time of the admission I concur with this clinical determination of medical necessity  Rationale is as follows:     The patient is a 80 yrs old Female who presented to the ED at 12/9/2017  3:50 PM with a chief complaint of Chest Pain (Pt c/o chest pain upon taking deep breath and trouble breathing which started yesterday)    The patients vitals on arrival were ED Triage Vitals [12/09/17 1549]   Temperature Pulse Respirations Blood Pressure SpO2   (!) 95 5 °F (35 3 °C) 88 18 129/60 100 %      Temp Source Heart Rate Source Patient Position - Orthostatic VS BP Location FiO2 (%)   Tympanic Monitor Sitting Left arm --      Pain Score       7           Past Medical History:   Diagnosis Date    Hyperlipidemia  Hypertension     Macular degeneration      Past Surgical History:   Procedure Laterality Date    HYSTERECTOMY             Consults have been placed to:   None    Vitals:    12/10/17 0330 12/10/17 0730 12/10/17 1053 12/10/17 1533   BP: 102/52 131/58 141/63 145/70   Pulse: 64 72 63 67   Resp: 18 18 18 18   Temp: 98 2 °F (36 8 °C) 98 6 °F (37 °C) 98 6 °F (37 °C) 98 6 °F (37 °C)   TempSrc:  Oral Oral Oral   SpO2: 96% 97% 96% 97%   Weight:       Height:           Most recent labs:    Recent Labs      12/10/17   0043  12/10/17   0500   WBC   --   8 84   HGB   --   11 6   HCT   --   34 1*   PLT   --   227   K   --   4 0   NA   --   142   CALCIUM   --   8 1*   BUN   --   22   CREATININE   --   0 94   TROPONINI  <0 02   --        Scheduled Meds:  acetaminophen 975 mg Oral Q8H [address]   cefTRIAXone 1,000 mg Intravenous Q24H   And      azithromycin 250 mg Oral Q24H   bimatoprost 1 drop Right Eye HS   docusate sodium 100 mg Oral BID   fish oil 1,000 mg Oral Daily   heparin (porcine) 5,000 Units Subcutaneous Q8H [address]   multivitamin-minerals 1 tablet Oral Daily     Continuous Infusions:  sodium chloride 100 mL/hr Last Rate: 100 mL/hr (12/10/17 1816)     PRN Meds: calcium carbonate    ondansetron

## 2017-12-11 NOTE — DISCHARGE INSTRUCTIONS
Please take metoprolol 12 5mg twice a day   Please STOP lisinopril  Please take a daily baby Asprin 81 mg     Please take ceuroxime 500mg twice a day for a total of 5 days  You received a dose before you were discharged and you are due for another dose this evening  Please follow up with cardiology as scheduled  Please follow up with PCP/family doctor in 2-4 weeks  You will need a repeat CXR in 4-6 weeks   Please seek medical attention immediately if you experience chest pain, palpitations, shortness of breath, fever 101 5 deg F, productive cough, unilateral weakness/slurred speech/dizziness/facial droop, passing out/syncope     Acute Kidney Injury   AMBULATORY CARE:   Acute kidney injury (ZECHARIAH) is also called acute kidney failure, or acute renal failure  ZECHARIAH happens when your kidneys suddenly stop working correctly  Normally, the kidneys remove fluid, chemicals, and waste from your blood  These wastes are turned into urine by your kidneys  ZECHARIAH usually happens over hours or days  When you have ZECHARIAH, your kidneys do not remove the waste, chemicals, or extra fluid from your body  A normal amount of urine is not produced  ZECHARIAH is usually temporary, but it may become a chronic kidney condition  Causes of ZECHARIAH:   · Decreased blood flow to the kidney, such as from hypercalcemia (high blood calcium level) or severe heart disease    · A disease or condition that affects the kidneys, such as hypertension (high blood pressure) or diabetes    · A blockage in the kidney or ureter, such as a kidney or bladder stone, enlarged prostate, or tumor  Common symptoms include the following: You may not have any symptoms with early or mild ZECHARIAH   As ZECHARIAH progresses, you may have any of the following:  · Decrease in the amount of urine or no urination   · Swelling in your arms, legs, or feet    · Weakness, drowsiness, or no appetite   · Nausea, flank pain, muscle twitching or muscle cramps   · Itchy skin, or your, breath or body smells like urine   · Behavior changes, confusion, disorientation, or seizures  Call 911 if:   · You have sudden chest pain or trouble breathing  Seek care immediately if:   · Your symptoms get worse  Contact your healthcare provider if:   · Your symptoms return  · Your blood sugar or blood pressure level is not within the range your healthcare provider recommends  · You have questions or concerns about your condition or care  Treatment for ZECHARIAH depends upon the cause of your acute kidney injury and how severe it is  Usually, ZECHARIAH will be monitored in the hospital  If you have mild ZECHARIAH, you may be able to go home to recover  Your healthcare providers will treat the cause of your ZECHARIAH  You may need IV fluids if your ZECHARIAH was caused by little or no fluid in your body  You may need dialysis to remove waste and extra fluid from your body  Nutrition: Your healthcare provider may tell you to eat food low in sodium (salt), potassium, phosphorus, or protein  A dietitian can help you plan your meals  Drink liquids as directed: Your healthcare provider may recommend that you drink a certain amount of liquids  This will help your kidneys work better and decrease your risk for dehydration  Ask how much liquid to drink each day and which liquids are best for you  What you can do to manage and prevent ZECHARIAH:   · Monitor and manage other health conditions  such as diabetes, high blood pressure, or heart disease  These conditions increase your risk for acute kidney injury  Take your medicines for these conditions as directed  Also, monitor your blood sugar and blood pressure levels as directed  Contact your healthcare provider if your levels are not in the range he or she says it should be  · Talk to your healthcare provider before you take over-the-counter-medicine  NSAIDs, stomach medicine, or laxatives may harm your kidneys and increase your risk for acute kidney injury   If it is okay to take the medicine, follow the directions on the package  Do not take more than directed  · Tell healthcare providers you have had acute kidney injury before you get contrast liquid for an x-ray or CT scan  Your healthcare provider may give you medicine to prevent kidney problems caused by the liquid  Follow up with your healthcare provider as directed: Write down your questions so you remember to ask them during your visits  © 2017 [employer] Information is for End User's use only and may not be sold, redistributed or otherwise used for commercial purposes  All illustrations and images included in CareNotes® are the copyrighted property of A D [employer]  or Juan Carlos Martinez  The above information is an  only  It is not intended as medical advice for individual conditions or treatments  Talk to your doctor, nurse or pharmacist before following any medical regimen to see if it is safe and effective for you      Community Acquired Pneumonia   WHAT YOU NEED TO KNOW:   Community-acquired pneumonia (CAP) is a lung infection that you get outside of a hospital or nursing home setting  Your lungs become inflamed and cannot work well  CAP may be caused by bacteria, viruses, or fungi  DISCHARGE INSTRUCTIONS:   Return to the emergency department if:   · You are confused and cannot think clearly  · You have increased trouble breathing  · Your lips or fingernails turn gray or blue  Contact your healthcare provider if:   · Your symptoms do not get better, or they get worse  · You are urinating less, or not at all  · You have questions or concerns about your condition or care  Medicines:   · Medicines may be given to treat a bacterial, viral, or fungal infection  You may also be given medicines to dilate your bronchial tubes to help you breathe more easily  · Take your medicine as directed  Contact your healthcare provider if you think your medicine is not helping or if you have side effects   Tell him or her if you are allergic to any medicine  Keep a list of the medicines, vitamins, and herbs you take  Include the amounts, and when and why you take them  Bring the list or the pill bottles to follow-up visits  Carry your medicine list with you in case of an emergency  Follow up with your healthcare provider within 3 days or as directed: You may need another x-ray  Write down your questions so you remember to ask them during your visits  Deep breathing and coughing: Deep breathing helps open the air passages in your lungs  Coughing helps bring up mucus from your lungs  Take a deep breath and hold the breath as long as you can  Then push the air out of your lungs with a deep, strong cough  Spit out any mucus you have coughed up  Take 10 deep breaths in a row every hour that you are awake  Remember to follow each deep breath with a cough  Do not smoke or allow others to smoke around you: Nicotine and other chemicals in cigarettes and cigars can cause lung damage  Ask your healthcare provider for information if you currently smoke and need help to quit  E-cigarettes or smokeless tobacco still contain nicotine  Talk to your healthcare provider before you use these products  Manage CAP at home:   · Breathe warm, moist air  This helps loosen mucus  Loosely place a warm, wet washcloth over your nose and mouth  A room humidifier may also help make the air moist    · Drink liquids as directed  Ask your healthcare provider how much liquid to drink each day and which liquids to drink  Liquids help make mucus thin and easier to get out of your body  · Gently tap your chest  This helps loosen mucus so it is easier to cough  Lie with your head lower than your chest several times a day and tap your chest     · Get plenty of rest  Rest helps your body heal   Prevent CAP:   · Wash your hands often with soap and water  Carry germ-killing hand gel with you   You can use the gel to clean your hands when soap and water are not available  Do not touch your eyes, nose, or mouth unless you have washed your hands first     · Clean surfaces often  Clean doorknobs, countertops, cell phones, and other surfaces that are touched often  · Always cover your mouth when you cough  Cough into a tissue or your shirtsleeve so you do not spread germs from your hands  · Try to avoid people who have a cold or the flu  If you are sick, stay away from others as much as possible  · Ask about vaccines  You may need a vaccine to help prevent pneumonia  Get an influenza (flu) vaccine every year as soon as it becomes available  © 2017 [facility] Information is for End User's use only and may not be sold, redistributed or otherwise used for commercial purposes  All illustrations and images included in CareNotes® are the copyrighted property of [employer]  or Juan Carlos Martinez  The above information is an  only  It is not intended as medical advice for individual conditions or treatments   Talk to your doctor, nurse or pharmacist before following any medical regimen to see if it is safe and effective for you

## 2017-12-14 LAB
BACTERIA BLD CULT: NORMAL
BACTERIA BLD CULT: NORMAL

## 2017-12-19 ENCOUNTER — ALLSCRIPTS OFFICE VISIT (OUTPATIENT)
Dept: OTHER | Facility: OTHER | Age: 82
End: 2017-12-19

## 2018-01-12 VITALS
DIASTOLIC BLOOD PRESSURE: 68 MMHG | BODY MASS INDEX: 24.43 KG/M2 | SYSTOLIC BLOOD PRESSURE: 130 MMHG | HEIGHT: 61 IN | WEIGHT: 129.38 LBS | HEART RATE: 72 BPM | TEMPERATURE: 98.3 F | RESPIRATION RATE: 16 BRPM

## 2018-01-12 NOTE — RESULT NOTES
Message   DW pt on 3/14/2016 OV  Verified Results  (1) CBC/PLT/DIFF [id number] 08:57AM Peyton Beckwith     Test Name Result Flag Reference   WBC COUNT 5 28 Thousand/uL  4 31-10 16   RBC COUNT 4 36 Million/uL  3 81-5 12   HEMOGLOBIN 14 5 g/dL  11 5-15 4   HEMATOCRIT 43 9 %  34 8-46  1    fL H 82-98   MCH 33 3 pg  26 8-34 3   MCHC 33 0 g/dL  31 4-37 4   RDW 12 6 %  11 6-15 1   MPV 9 1 fL  8 9-12 7   PLATELET COUNT 112 Thousands/uL  149-390   nRBC AUTOMATED 0 /100 WBCs     NEUTROPHILS RELATIVE PERCENT 68 %  43-75   LYMPHOCYTES RELATIVE PERCENT 24 %  14-44   MONOCYTES RELATIVE PERCENT 6 %  4-12   EOSINOPHILS RELATIVE PERCENT 1 %  0-6   BASOPHILS RELATIVE PERCENT 1 %  0-1   NEUTROPHILS ABSOLUTE COUNT 3 65 Thousands/µL  1 85-7 62   LYMPHOCYTES ABSOLUTE COUNT 1 24 Thousands/µL  0 60-4 47   MONOCYTES ABSOLUTE COUNT 0 29 Thousand/µL  0 17-1 22   EOSINOPHILS ABSOLUTE COUNT 0 05 Thousand/µL  0 00-0 61   BASOPHILS ABSOLUTE COUNT 0 04 Thousands/µL  0 00-0 10     (1) LIPID PANEL FASTING W DIRECT LDL REFLEX [id number] 08:57AM Peyton Beckwith   Triglyceride:         Normal              <150 mg/dl       Borderline High    150-199 mg/dl       High               200-499 mg/dl       Very High          >499 mg/dl  Cholesterol:         Desirable        <200 mg/dl      Borderline High  200-239 mg/dl      High             >239 mg/dl  HDL Cholesterol:        High    >59 mg/dL      Low     <41 mg/dL  LDL Cholesterol:        Optimal          <100 mg/dl         Near Optimal     100-129 mg/dl        Above Optimal          Borderline High   130-159 mg/dl          High              160-189 mg/dl          Very High        >189 mg/dl  LDL CALCULATED:    This screening LDL is a calculated result  It does not have the accuracy of the Direct Measured LDL in the monitoring of patients with hyperlipidemia and/or statin therapy  Direct Measure LDL (HSE364) must be ordered separately in these patients       Test Name Result Flag Reference   CHOLESTEROL 275 mg/dL H    LDL CHOLESTEROL CALCULATED 145 mg/dL H 0-100   TRIGLYCERIDES 179 mg/dL H <=150   HDL,DIRECT 94 mg/dL H 40-60

## 2018-01-13 NOTE — RESULT NOTES
Message   CXR is normal       Verified Results  * XR CHEST PA & LATERAL [id number] 05:25PM Hoda  Order Number: [id number]     Test Name Result Flag Reference   XR CHEST PA & LATERAL (Report)     CHEST     INDICATION: Cough  COMPARISON: 1/2/2005  VIEWS: Frontal and lateral projections; 2 images     FINDINGS:         The cardiomediastinal silhouette is unremarkable  The lungs are clear  No pleural effusions  Osseous structures are age appropriate  IMPRESSION:     No active pulmonary disease  Workstation performed: [id number]     Signed by:    Katlyn Lainez MD   2/22/17

## 2018-01-14 VITALS
HEIGHT: 61 IN | HEART RATE: 74 BPM | DIASTOLIC BLOOD PRESSURE: 82 MMHG | OXYGEN SATURATION: 96 % | SYSTOLIC BLOOD PRESSURE: 124 MMHG | WEIGHT: 133.25 LBS | TEMPERATURE: 101.1 F | BODY MASS INDEX: 25.16 KG/M2 | RESPIRATION RATE: 16 BRPM

## 2018-01-14 VITALS
HEART RATE: 72 BPM | SYSTOLIC BLOOD PRESSURE: 146 MMHG | DIASTOLIC BLOOD PRESSURE: 62 MMHG | BODY MASS INDEX: 25.07 KG/M2 | TEMPERATURE: 98.4 F | WEIGHT: 132.8 LBS | RESPIRATION RATE: 16 BRPM | HEIGHT: 61 IN

## 2018-01-14 VITALS
RESPIRATION RATE: 16 BRPM | SYSTOLIC BLOOD PRESSURE: 130 MMHG | TEMPERATURE: 97.3 F | BODY MASS INDEX: 24.55 KG/M2 | WEIGHT: 130 LBS | OXYGEN SATURATION: 96 % | DIASTOLIC BLOOD PRESSURE: 62 MMHG | HEIGHT: 61 IN | HEART RATE: 72 BPM

## 2018-01-15 VITALS
SYSTOLIC BLOOD PRESSURE: 140 MMHG | HEIGHT: 61 IN | WEIGHT: 131.2 LBS | HEART RATE: 64 BPM | RESPIRATION RATE: 12 BRPM | TEMPERATURE: 98.3 F | BODY MASS INDEX: 24.77 KG/M2 | DIASTOLIC BLOOD PRESSURE: 80 MMHG

## 2018-01-16 NOTE — RESULT NOTES
Verified Results  (1) CBC/PLT/DIFF [id number] 02:02PM Samson Albert Order Number: [id number]     Test Name Result Flag Reference   WBC COUNT 6 71 Thousand/uL  4 31-10 16   RBC COUNT 4 14 Million/uL  3 81-5 12   HEMOGLOBIN 14 0 g/dL  11 5-15 4   HEMATOCRIT 42 3 %  34 8-46  1    fL H 82-98   MCH 33 8 pg  26 8-34 3   MCHC 33 1 g/dL  31 4-37 4   RDW 12 8 %  11 6-15 1   MPV 9 5 fL  8 9-12 7   PLATELET COUNT 468 Thousands/uL  149-390   nRBC AUTOMATED 0 /100 WBCs     NEUTROPHILS RELATIVE PERCENT 68 %  43-75   LYMPHOCYTES RELATIVE PERCENT 24 %  14-44   MONOCYTES RELATIVE PERCENT 6 %  4-12   EOSINOPHILS RELATIVE PERCENT 2 %  0-6   BASOPHILS RELATIVE PERCENT 0 %  0-1   NEUTROPHILS ABSOLUTE COUNT 4 57 Thousands/? ??L  1 85-7 62   LYMPHOCYTES ABSOLUTE COUNT 1 61 Thousands/? ??L  0 60-4 47   MONOCYTES ABSOLUTE COUNT 0 37 Thousand/? ??L  0 17-1 22   EOSINOPHILS ABSOLUTE COUNT 0 12 Thousand/? ??L  0 00-0 61   BASOPHILS ABSOLUTE COUNT 0 03 Thousands/? ??L  0 00-0 10     (1) COMPREHENSIVE METABOLIC PANEL [id number] 15:88EE [facility] Order Number: [id number]     Test Name Result Flag Reference   GLUCOSE,RANDM 84 mg/dL     If the patient is fasting, the ADA then defines impaired fasting glucose as > 100 mg/dL and diabetes as > or equal to 123 mg/dL  Specimen collection should occur prior to Sulfasalazine administration due to the potential for falsely depressed results  Specimen collection should occur prior to Sulfapyridine administration due to the potential for falsely elevated results     SODIUM 142 mmol/L  136-145   POTASSIUM 4 3 mmol/L  3 5-5 3   CHLORIDE 110 mmol/L H 100-108   CARBON DIOXIDE 27 mmol/L  21-32   ANION GAP (CALC) 5 mmol/L  4-13   BLOOD UREA NITROGEN 19 mg/dL  5-25   CREATININE 0 92 mg/dL  0 60-1 30   Standardized to IDMS reference method   CALCIUM 8 8 mg/dL  8 3-10 1   BILI, TOTAL 0 80 mg/dL  0 20-1 00   ALK PHOSPHATAS 99 U/L     ALT (SGPT) 18 U/L  12-78   Specimen collection should occur prior to Sulfasalazine and/or Sulfapyridine administration due to the potential for falsely depressed results  AST(SGOT) 21 U/L  5-45   Specimen collection should occur prior to Sulfasalazine administration due to the potential for falsely depressed results  ALBUMIN 3 3 g/dL L 3 5-5 0   TOTAL PROTEIN 6 5 g/dL  6 4-8 2   eGFR 55 ml/min/1 73sq m     [facility] Disease Education Program recommendations are as follows:  GFR calculation is accurate only with a steady state creatinine  Chronic Kidney disease less than 60 ml/min/1 73 sq  meters  Kidney failure less than 15 ml/min/1 73 sq  meters  (1) VITAMIN D 25-HYDROXY [id number] 02:02PM Donavon Mattapoisett Order Number: [id number]     Test Name Result Flag Reference   VIT D 25-HYDROX 34 4 ng/mL  30 0-100 0   This assay is a certified procedure of the CDC Vitamin D Standardization Certification Program (VDSCP)     Deficiency <20ng/ml   Insufficiency 20-30ng/ml   Sufficient  ng/ml     *Patients undergoing fluorescein dye angiography may retain small amounts of fluorescein in the body for 48-72 hours post procedure  Samples containing fluorescein can produce falsely elevated Vitamin D values  If the patient had this procedure, a specimen should be resubmitted post fluorescein clearance  (1) TSH WITH FT4 REFLEX [id number] 02:02PM Pattie HENDRIX Order Number: [id number]     Test Name Result Flag Reference   TSH 1 750 uIU/mL  0 358-3 740   Patients undergoing fluorescein dye angiography may retain small amounts of fluorescein in the body for 48-72 hours post procedure  Samples containing fluorescein can produce falsely depressed TSH values  If the patient had this procedure,a specimen should be resubmitted post fluorescein clearance            The recommended reference ranges for TSH during pregnancy are as follows:  First trimester 0 1 to 2 5 uIU/mL  Second trimester  0 2 to 3 0 uIU/mL  Third trimester 0 3 to 3 0 uIU/m

## 2018-01-22 VITALS
WEIGHT: 139 LBS | HEART RATE: 78 BPM | OXYGEN SATURATION: 97 % | HEIGHT: 61 IN | TEMPERATURE: 98.7 F | BODY MASS INDEX: 26.24 KG/M2 | DIASTOLIC BLOOD PRESSURE: 78 MMHG | SYSTOLIC BLOOD PRESSURE: 124 MMHG | RESPIRATION RATE: 16 BRPM

## 2018-01-23 NOTE — MISCELLANEOUS
Assessment    1  Hospital discharge follow-up (V67 59) (Z09)   2  Community acquired pneumonia (5) (J18 9)   3  Pleuritic chest pain (786 52) (R07 81)   4  Hypertension (401 9) (I10)    Plan  Hypertension    · Lisinopril 20 MG Oral Tablet   Rx By: Silvio Johnson; Dispense: 90 Days ; #:1 X 180 Tablet Bottle; Refill: 1; For: Hypertension; SPIEK = N; Sent To: [facility]   · Metoprolol Tartrate 25 MG Oral Tablet; TAKE 0 5 TABLET Twice daily   Rx By: Silvio Johnson; Dispense: 30 Days ; #:30 Tablet; Refill: 0; For: Hypertension; SPIKE = N; Record  Hypertension, Pleuritic chest pain    · 1 Areli Webb MD, Claudia Zapata  (Cardiology) Co-Management  *  Status: Active  Requested for:  29IGT3935   Ordered; For: Hypertension, Pleuritic chest pain; Ordered By: Silvio Johnson Performed:  Due: 88PFG1020  Care Summary provided  : Yes    Discussion/Summary  Discussion Summary:   Reviewed hospital records  Continue current meds  FU cardiology as scheduled  Medication SE Review and Pt Understands Tx: Possible side effects of new medications were reviewed with the patient/guardian today  The treatment plan was reviewed with the patient/guardian  The patient/guardian understands and agrees with the treatment plan      Chief Complaint  Chief Complaint Free Text Note Form: Patient was discharged from [facility] for Sepsis  History of Present Illness  TCM Communication [facility]: The patient is being contacted for follow-up after hospitalization  She was hospitalized at [facility]  The dates of hospitalization: 2 days, date of admission: 12/09/2017, date of discharge: 12/11/2017  Diagnosis: Sepsis  She was discharged to home  Medications were not reviewed today  She scheduled a follow up appointment  Counseling was provided to the patient  Topics counseled included importance of compliance with treatment  Communication performed and completed by Jon Kayser   HPI: Pt is here with      Was in hospital 12/9/2017-12/12/2017 for chest pain    CT chest showed pneumonia and pericardial effusion  Pt was on azithromycin and ceftriaxone  The night before discharge, pt was found to be in Afib with RVR  Cardiology consulted  Pt was recommend to stop lisinopril and take metoprolol 12 5mg bid  Pt discharged home  Feels fine now  Denies fever, cough, SOB, CP, n/v/abd pain  Cardiology appt will be 1/18/2018  Review of Systems  Complete-Female:   Constitutional: No fever, no chills, feels well, no tiredness, no recent weight gain or weight loss  ENT: no complaints of earache, no loss of hearing, no nose bleeds, no nasal discharge, no sore throat, no hoarseness  Cardiovascular: No complaints of slow heart rate, no fast heart rate, no chest pain, no palpitations, no leg claudication, no lower extremity edema  Respiratory: No complaints of shortness of breath, no wheezing, no cough, no SOB on exertion, no orthopnea, no PND  Gastrointestinal: No complaints of abdominal pain, no constipation, no nausea or vomiting, no diarrhea, no bloody stools  Musculoskeletal: No complaints of arthralgias, no myalgias, no joint swelling or stiffness, no limb pain or swelling  Active Problems    1  Benign colon polyp (211 3) (K63 5)   2  Fatigue (780 79) (R53 83)   3  Hyperlipidemia (272 4) (E78 5)   4  Hypertension (401 9) (I10)   5  Impaired fasting glucose (790 21) (R73 01)   6  Medicare annual wellness visit, initial (V70 0) (Z00 00)   7  Medicare annual wellness visit, subsequent (V70 0) (Z00 00)   8  Need for pneumococcal vaccination (V03 82) (Z23)   9  Need for prophylactic vaccination and inoculation against influenza (V04 81) (Z23)   10  Osteopenia (733 90) (M85 80)   11  Snores (786 09) (R06 83)   12  Taste absent (781 1) (R43 2)   13  Vitamin D deficiency (268 9) (E55 9)    Past Medical History    1  History of Anosmia (781 1) (R43 0)   2  History of Cellulitis (682 9) (L03 90)   3   History of Generalized osteoarthritis of multiple sites (715 09) (M15 9)   4  History of acute bronchitis (V12 69) (Z87 09)   5  History of acute bronchitis with bronchospasm (V12 69) (Z87 09)   6  History of acute sinusitis (V12 69) (Z87 09)   7  History of bronchitis (V12 69) (Z87 09)   8  History of diverticulitis of colon (V12 79) (Z87 19)   9  History of fever (V13 89) (Z87 898)   10  History of influenza (V12 09) (Z87 09)   11  History of Impacted cerumen of both ears (380 4) (H61 23)   12  History of Lyme disease (088 81) (A69 20)   13  History of Tremor (781 0) (R25 1)    Surgical History    1  History of Biopsy Breast Open   2  History of Cataract Surgery   3  History of Total Abdominal Hysterectomy With Removal Of Both Ovaries    Family History  Father    1  Family history of Lung Cancer (V16 1)    Social History    · Being A Social Drinker   · Never A Smoker    Current Meds   1  Lisinopril 20 MG Oral Tablet; Take 1 tablet twice daily; Therapy: 60AOO1913 to (Evaluate:24Mar2018)  Requested for: 83RRG3366; Last   Rx:87Pgy2775 Ordered   2  Lumigan 0 01 % Ophthalmic Solution; SOLN OP X 90;   Therapy: 83PAF3194 to (Last Rx:17Nov2010)  Requested for: 74KOO9589 Ordered   3  Omega-3 1000 MG Oral Capsule; Therapy: 43GGD8177 to Recorded   4  Oyster Shell Calcium + D 500-400 MG-UNIT Oral Tablet; Therapy: 61WEH7189 to Recorded    Allergies    1  No Known Drug Allergies    Vitals  Signs   Recorded: 24BUA1367 02:36PM   Temperature: 98 7 F, Tympanic  Heart Rate: 78, L Radial  Pulse Quality: Normal, L Radial  Respiration Quality: Normal  Respiration: 16  Systolic: 426, LUE, Sitting  Diastolic: 78, LUE, Sitting  Height: 5 ft 1 in  Weight: 139 lb   BMI Calculated: 26 26  BSA Calculated: 1 62  O2 Saturation: 97  Pain Scale: 0    Physical Exam    Constitutional   General appearance: No acute distress, well appearing and well nourished      Ears, Nose, Mouth, and Throat   External inspection of ears and nose: Normal     Otoscopic examination: Tympanic membranes translucent with normal light reflex  Canals patent without erythema  Nasal mucosa, septum, and turbinates: Normal without edema or erythema  Oropharynx: Normal with no erythema, edema, exudate or lesions  Pulmonary   Respiratory effort: No increased work of breathing or signs of respiratory distress  Auscultation of lungs: Clear to auscultation  Cardiovascular   Auscultation of heart: Normal rate and rhythm, normal S1 and S2, without murmurs  Examination of extremities for edema and/or varicosities: Normal     Carotid pulses: Normal     Abdomen   Abdomen: Non-tender, no masses  Liver and spleen: No hepatomegaly or splenomegaly  Lymphatic   Palpation of lymph nodes in neck: No lymphadenopathy  Musculoskeletal   Gait and station: Normal          Future Appointments    Date/Time Provider Specialty Site   03/29/2018 10:20 AM Kim Florian   01/18/2018 08:40 AM COREY Mckeon  Cardiology [facility]     Signatures   Electronically signed by :  Maame Russo MD; Dec 19 2017  3:38PM EST                       (Author)

## 2018-01-31 ENCOUNTER — OFFICE VISIT (OUTPATIENT)
Dept: FAMILY MEDICINE CLINIC | Facility: CLINIC | Age: 83
End: 2018-01-31
Payer: MEDICARE

## 2018-01-31 ENCOUNTER — APPOINTMENT (OUTPATIENT)
Dept: LAB | Facility: HOSPITAL | Age: 83
End: 2018-01-31
Payer: MEDICARE

## 2018-01-31 VITALS
TEMPERATURE: 97.2 F | WEIGHT: 126.6 LBS | DIASTOLIC BLOOD PRESSURE: 62 MMHG | HEART RATE: 80 BPM | RESPIRATION RATE: 16 BRPM | SYSTOLIC BLOOD PRESSURE: 138 MMHG | BODY MASS INDEX: 23.9 KG/M2 | HEIGHT: 61 IN

## 2018-01-31 DIAGNOSIS — I10 ESSENTIAL HYPERTENSION: ICD-10-CM

## 2018-01-31 DIAGNOSIS — R35.0 URINARY FREQUENCY: Primary | ICD-10-CM

## 2018-01-31 PROBLEM — R53.83 FATIGUE: Status: ACTIVE | Noted: 2017-05-15

## 2018-01-31 PROBLEM — R06.83 SNORES: Status: ACTIVE | Noted: 2017-05-15

## 2018-01-31 LAB
SL AMB  POCT GLUCOSE, UA: ABNORMAL
SL AMB LEUKOCYTE ESTERASE,UA: ABNORMAL
SL AMB POCT BILIRUBIN,UA: ABNORMAL
SL AMB POCT BLOOD,UA: ABNORMAL
SL AMB POCT CLARITY,UA: ABNORMAL
SL AMB POCT COLOR,UA: YELLOW
SL AMB POCT KETONES,UA: ABNORMAL
SL AMB POCT NITRITE,UA: ABNORMAL
SL AMB POCT PH,UA: 5.5
SL AMB POCT SPECIFIC GRAVITY,UA: 1.03
SL AMB POCT UROBILINOGEN: 0.2

## 2018-01-31 PROCEDURE — 87086 URINE CULTURE/COLONY COUNT: CPT | Performed by: FAMILY MEDICINE

## 2018-01-31 PROCEDURE — 81002 URINALYSIS NONAUTO W/O SCOPE: CPT | Performed by: FAMILY MEDICINE

## 2018-01-31 PROCEDURE — 99214 OFFICE O/P EST MOD 30 MIN: CPT | Performed by: FAMILY MEDICINE

## 2018-01-31 RX ORDER — CIPROFLOXACIN 250 MG/1
250 TABLET, FILM COATED ORAL EVERY 12 HOURS SCHEDULED
Qty: 14 TABLET | Refills: 0 | Status: SHIPPED | OUTPATIENT
Start: 2018-01-31 | End: 2018-02-07

## 2018-01-31 RX ORDER — CALCIUM CARBONATE/VITAMIN D3 500 MG-10
TABLET ORAL
COMMUNITY
Start: 2012-07-04 | End: 2019-03-08 | Stop reason: ALTCHOICE

## 2018-01-31 NOTE — PROGRESS NOTES
Patient is here because she is having GI issues she had been having diarrhea   Assessment/Plan:    Urine dipstick showed +leuk  Will send for urine culture  Give cipro  Side effects educated pt  Call or return to clinic prn if these symptoms worsen or fail to improve as anticipated  Diagnoses and all orders for this visit:    Urinary frequency  -     POCT urine dip  -     Urine culture; Future  -     ciprofloxacin (CIPRO) 250 mg tablet; Take 1 tablet (250 mg total) by mouth every 12 (twelve) hours for 7 days    Essential hypertension  Comments:  BP controlled OK  continue metoprolol 12 5mg bid  Orders:  -     metoprolol tartrate (LOPRESSOR) 25 mg tablet; Take 0 5 tablets (12 5 mg total) by mouth every 12 (twelve) hours for 30 days          Subjective:      Patient ID: Jocelyn Bales is a 80 y o  female  HPI  Pt is here with   SUBJECTIVE: Jocelyn Bales is a 80 y o  female who complains of urinary frequency, urgency for 2 weeks, without flank pain, fever, chills, or abnormal vaginal discharge or bleeding  HTN---She is on metoprolol 12 5mg bid  Did not follow cardiology  The following portions of the patient's history were reviewed and updated as appropriate: allergies, current medications, past family history, past medical history, past social history, past surgical history and problem list     Review of Systems   Constitutional: Negative for appetite change, chills and fever  HENT: Negative for congestion, ear pain, sinus pain and sore throat  Eyes: Negative for discharge and itching  Respiratory: Negative for apnea, cough, chest tightness, shortness of breath and wheezing  Cardiovascular: Negative for chest pain, palpitations and leg swelling  Gastrointestinal: Negative for abdominal pain, anal bleeding, constipation, diarrhea, nausea and vomiting  Endocrine: Negative for cold intolerance, heat intolerance and polyuria  Genitourinary: Positive for frequency   Negative for difficulty urinating, dysuria and flank pain  Musculoskeletal: Negative for arthralgias, back pain and myalgias  Skin: Negative for rash  Neurological: Negative for dizziness and headaches  Psychiatric/Behavioral: Negative for agitation  Objective:     Physical Exam   Constitutional: She appears well-developed  No distress  HENT:   Head: Normocephalic  Right Ear: External ear normal    Left Ear: External ear normal    Nose: Nose normal    Mouth/Throat: Oropharynx is clear and moist    Eyes: Conjunctivae are normal  Pupils are equal, round, and reactive to light  Right eye exhibits no discharge  Left eye exhibits no discharge  Neck: Normal range of motion  No thyromegaly present  Cardiovascular: Normal rate, regular rhythm and normal heart sounds  Exam reveals no gallop and no friction rub  No murmur heard  Pulmonary/Chest: Effort normal and breath sounds normal  No respiratory distress  She has no wheezes  She has no rales  She exhibits no tenderness  Abdominal: Soft  Bowel sounds are normal  She exhibits no distension and no mass  There is no tenderness  There is no rebound and no guarding  Musculoskeletal: Normal range of motion  She exhibits no edema, tenderness or deformity  Lymphadenopathy:     She has no cervical adenopathy  Neurological: She is alert  Psychiatric: She has a normal mood and affect

## 2018-02-02 LAB — BACTERIA UR CULT: NORMAL

## 2018-02-05 ENCOUNTER — TELEPHONE (OUTPATIENT)
Dept: FAMILY MEDICINE CLINIC | Facility: CLINIC | Age: 83
End: 2018-02-05

## 2018-02-05 NOTE — TELEPHONE ENCOUNTER
----- Message from Kang David MD sent at 2/5/2018 11:28 AM EST -----  Urine culture showed mixed contaminants  If symptoms persist, let me know

## 2018-03-29 ENCOUNTER — OFFICE VISIT (OUTPATIENT)
Dept: FAMILY MEDICINE CLINIC | Facility: CLINIC | Age: 83
End: 2018-03-29
Payer: MEDICARE

## 2018-03-29 VITALS
HEIGHT: 61 IN | HEART RATE: 60 BPM | SYSTOLIC BLOOD PRESSURE: 126 MMHG | BODY MASS INDEX: 24.88 KG/M2 | TEMPERATURE: 97.7 F | DIASTOLIC BLOOD PRESSURE: 60 MMHG | WEIGHT: 131.8 LBS | RESPIRATION RATE: 16 BRPM

## 2018-03-29 DIAGNOSIS — M85.80 OSTEOPENIA, UNSPECIFIED LOCATION: ICD-10-CM

## 2018-03-29 DIAGNOSIS — E78.5 HYPERLIPIDEMIA, UNSPECIFIED HYPERLIPIDEMIA TYPE: ICD-10-CM

## 2018-03-29 DIAGNOSIS — Z00.00 MEDICARE ANNUAL WELLNESS VISIT, SUBSEQUENT: Primary | ICD-10-CM

## 2018-03-29 DIAGNOSIS — I10 ESSENTIAL HYPERTENSION: ICD-10-CM

## 2018-03-29 DIAGNOSIS — E55.9 VITAMIN D DEFICIENCY: ICD-10-CM

## 2018-03-29 DIAGNOSIS — R73.01 IMPAIRED FASTING GLUCOSE: ICD-10-CM

## 2018-03-29 PROBLEM — N17.9 AKI (ACUTE KIDNEY INJURY) (HCC): Status: RESOLVED | Noted: 2017-12-09 | Resolved: 2018-03-29

## 2018-03-29 PROBLEM — J18.9 CAP (COMMUNITY ACQUIRED PNEUMONIA): Status: RESOLVED | Noted: 2017-12-09 | Resolved: 2018-03-29

## 2018-03-29 PROBLEM — I31.3 PERICARDIAL EFFUSION: Status: RESOLVED | Noted: 2017-12-09 | Resolved: 2018-03-29

## 2018-03-29 PROBLEM — I31.39 PERICARDIAL EFFUSION: Status: RESOLVED | Noted: 2017-12-09 | Resolved: 2018-03-29

## 2018-03-29 PROBLEM — R07.81 PLEURITIC CHEST PAIN: Status: RESOLVED | Noted: 2017-12-09 | Resolved: 2018-03-29

## 2018-03-29 PROBLEM — A41.9 SEPSIS (HCC): Status: RESOLVED | Noted: 2017-12-09 | Resolved: 2018-03-29

## 2018-03-29 PROCEDURE — 99214 OFFICE O/P EST MOD 30 MIN: CPT | Performed by: FAMILY MEDICINE

## 2018-03-29 PROCEDURE — G0439 PPPS, SUBSEQ VISIT: HCPCS | Performed by: FAMILY MEDICINE

## 2018-03-29 NOTE — PROGRESS NOTES
HPI:  Olga Luther is a 80 y o  female here for her Subsequent Wellness Visit  Patient Active Problem List   Diagnosis    Hypertension    Benign colon polyp    Fatigue    Hyperlipidemia    Impaired fasting glucose    Osteopenia    Snores    Taste absent    Vitamin D deficiency     Past Medical History:   Diagnosis Date    Hyperlipidemia     Hypertension     Macular degeneration      Past Surgical History:   Procedure Laterality Date    HYSTERECTOMY       History reviewed  No pertinent family history  History   Smoking Status    Never Smoker   Smokeless Tobacco    Never Used     History   Alcohol Use    3 0 oz/week    5 Glasses of wine per week      History   Drug Use No     /60   Pulse 60   Temp 97 7 °F (36 5 °C) (Tympanic)   Resp 16   Ht 5' 1" (1 549 m)   Wt 59 8 kg (131 lb 12 8 oz)   BMI 24 90 kg/m²       Current Outpatient Prescriptions   Medication Sig Dispense Refill    aspirin 81 mg chewable tablet Chew 1 tablet daily 30 tablet 0    bimatoprost (LUMIGAN) 0 01 % ophthalmic drops Administer 1 drop to the right eye daily at bedtime      Calcium Carbonate-Vitamin D (OYSTER SHELL CALCIUM/D) 500-400 MG-UNIT TABS Take by mouth      Multiple Vitamins-Minerals (ICAPS MV PO) Take by mouth      omega-3-acid ethyl esters (LOVAZA) 1 g capsule Take 2 g by mouth 2 (two) times a day       No current facility-administered medications for this visit        No Known Allergies  Immunization History   Administered Date(s) Administered    Influenza 09/14/2016, 09/25/2017    Influenza Split High Dose Preservative Free IM 09/14/2015, 09/14/2016, 09/25/2017    Influenza TIV (IM) 10/03/2011, 12/20/2012, 10/18/2013    Pneumococcal Conjugate 13-Valent 09/14/2015    Pneumococcal Polysaccharide PPV23 01/01/2007, 03/20/2017    Td (adult), adsorbed 01/01/1990, 10/08/2000       Patient Care Team:  Ashley Curtis MD as PCP - General      Medicare Screening Tests and Risk Assessments:  AWV Clinical ISAR:   Previous hospitalizations?:  No       Once in a Lifetime Medicare Screening:   EKG performed?:  No    AAA screening performed? (if performed, please add date to Health Maintenance):  No       Medicare Screening Tests and Risk Assessment:   AAA Risk Assessment    Osteoporosis Risk Assessment     Female:  Yes   :  Yes    Age over 48:  Yes    HIV Risk Assessment     Male with male sex after 65:  No   Past/present IV drug use:  No Multiple unprotected sex partners:  No   Being treated for HIV:  No Exchanges sex for money/drugs or partner who does:  No   Past/present partner HIV positive:  No Recieves care in high risk/high prevalence setting:  No   Past/present partner Bisexual:  No    Past/present partner IV drugs:  No        Drug and Alcohol Use:   Tobacco use    Cigarettes:  never smoker    Smokeless:  never used smokeless tobacco    Tobacco use duration    Tobacco Cessation Readiness    Alcohol use    Alcohol use:  frequent use    Amount of alcohol consumed:  2 per day   Alcohol Treatment Readiness   Illicit Drug Use    Drug use:  never    Drug type:  no sedative use       Diet & Exercise:   Diet   What is your diet?:  Regular   How many servings a day of the following:   Fruits and Vegetables:  1-2    Coffee:  2    Exercise    Do you currently exercise?:  yes    Frequency:  daily    Type of exercise:  walking       Cognitive Impairment Screening:   Depression screening preformed:  Yes     PHQ-9 Depression scale score:  1   Depression screening results:  no significant symptoms   Cognitive Impairment Screening        Functional Ability/Level of Safety:   Hearing    Hearing difficulties:  No Bilateral:  normal   Left:  normal Right:  normal   Hearing aid:  No    Hearing Impairment Assessment    Current Activities    Status:  unlimited ADL's, unlimited driving, unlimited IADL's, unlimited social activities   Help needed with the folllowing:    Using the phone:  No Transportation:  No   Shopping:  No Preparing Meals:  No   Doing Housework:  No Doing Laundry:  No   Managing Medications:  No Managing Money:  No   ADL    Feeding:  Independant   Oral hygiene and Facial grooming:  Independant   Bathing:  Independant   Upper Body Dressing:  Independant   Lower Body Dressing:  Independant   Toileting:  Independant   Bed Mobility:  Independant   Fall Risk   Have you fallen in the last 12 months?:  No    Injury History   Polypharmacy:  No Antidepressant Use:  No   Sedative Use:  No Antihypertensive Use:  No   Previous Fall:  No Alcohol Use:  No   Deconditioning:  No Visual Impairment:  No   Cogitive Impairment:  No Mmobility Impairment:  No   Postural Hypotension:  No Urinary Incontinence:  No       Home Safety:   Home Safety Risk Factors   Unfamilar with surroundings:  No Uneven floors:  No   Stairs or handrail saftey risk:  No Loose rugs:  No   Household clutter:  No Poor household lighting:  No   No grab bars in bathroom:  No Further evaluation needed:  No       Advanced Directives:   Advanced Directives    Living Will:  Yes Durable POA for healthcare: Yes   Advanced directive:  Yes    Patient's End of Life Decisions        Urinary Incontinence:       Glaucoma:            Provider Screening     Preventative Screening/Counseling:   Cardiovascular Screening/Counseling:   (Labs Q5 years, EKG optional one-time)   General:  Risks and Benefits Discussed, Screening Current           Diabetes Screening/Counseling:   (2 tests/year if Pre-Diabetes or 1 test/year if no Diabetes)   General:  Risks and Benefits Discussed, Screening Current           Colorectal Cancer Screening/Counseling:   (FOBT Q1 yr; Flex Sig Q4 yrs or Q10 yrs after Screening Colonoscopy; Screening Colonoscpy Q2 yrs High Risk or Q10 yrs Low Risk; Barium Enema Q2 yrs High Risk or Q4 yrs Low Risk)         Prostate Cancer Screening/Counseling:   (Annual)          Breast Cancer Screening/Counseling:   (Baseline Age 28 - 43;  Annual Age 36+)         Cervical Cancer Screening/Counseling:   (Annual for High Risk or Childbearing Age with Abnormal Pap in Last 3 yrs; Every 2 all others)         Osteoporosis Screening/Counseling:   (Every 2 Yrs if at risk or more if medically necessary)         AAA Screening/Counseling:   (Once per Lifetime with risk factors)          Glaucoma Screening/Counseling:   (Annual)         HIV Screening/Counseling:   (Voluntary; Once annually for high risk OR 3 times for Pregnancy at diagnosis of IUP; 3rd trimester; and at Labor         Hepatitis C Screening:             Immunizations:   Influenza (annual):  Risks & Benefits Discussed, Influenza Recommended Annually   Pneumococcal (Once in a Lifetime):  Risks & Benefits Discussed, Lifetime Vaccine Completed       Other Preventative Couseling (Non-Medicare Wellness Visit Required):       Referrals (Non-Medicare Wellness Visit Required):       Medical Equipment/Suppliers:           No exam data present  Reviewed Updated [facility] Prior Wellness Visits:   Last Medicare wellness visit information was reviewed, patient interviewed , no change since last AWVyes  Last Medicare wellness visit information was reviewed, patient interviewed and updates made to the record today yes    Assessment and Plan:  1  Medicare annual wellness visit, subsequent     2  Essential hypertension      BP ok today  DASH diet  Check Bp 2/week  3  Hyperlipidemia, unspecified hyperlipidemia type      continue fish oil  4  Impaired fasting glucose      Low carb diet  5  Osteopenia, unspecified location      Continue calcium and vitD  Fall precautions  6  Vitamin D deficiency      Continue vitD daily  MMSE 27/30 today in office  Pt has Evie Mcwilliams [address]  DNR per pt  Will bring copy of advance directive to office next time       Health Maintenance Due   Topic Date Due    Depression Screening PHQ-9  03/07/1938    Fall Risk  03/07/1991    Urinary Incontinence Screening  03/07/1991    GLAUCOMA SCREENING 67+ YR  03/07/1993    DTaP,Tdap,and Td Vaccines (1 - Tdap) 10/09/2000

## 2018-03-29 NOTE — PROGRESS NOTES
Patient is here for an AWV  Assessment/Plan:      Cont current fish oil, ASA 81mg, calcium and vitD  Got prevnar 2015  Got pneumovax 2017  Refused Dexa scan  Fall precautions  RTO in 6 months  Diagnoses and all orders for this visit:    Medicare annual wellness visit, subsequent    Essential hypertension  Comments:  BP ok today  DASH diet  Check Bp 2/week  Hyperlipidemia, unspecified hyperlipidemia type  Comments:  continue fish oil  Impaired fasting glucose  Comments:  Low carb diet  Osteopenia, unspecified location  Comments:  Continue calcium and vitD  Fall precautions  Vitamin D deficiency  Comments:  Continue vitD daily  Subjective:      Patient ID: Fili Andrade is a 80 y o  female  HPI    Pt is here with her   HTN---Pt states she does not take her metoprolol for 1 month  BP at home 130/70 usually  Today /60 good  Denies headache, vision change, SOB or chest pain  Hyperlipidemia---on fish oil daily  IFG---low carb diet  Osteopenia---Pt is on calcium and vitD  VitD deficiency---9/2017 vitD 34 4 ok  FU opthalmology Dr Robe Ruiz every 3 months for glaucoma  Got shot every 3 months  Stable now  Lives with   Does all ADL's  No kids  Denies recent falls  Denies depression  The following portions of the patient's history were reviewed and updated as appropriate: allergies, current medications, past family history, past medical history, past social history, past surgical history and problem list     Review of Systems   Constitutional: Negative for appetite change, chills and fever  HENT: Negative for congestion, ear pain, sinus pain and sore throat  Eyes: Negative for discharge and itching  Respiratory: Negative for apnea, cough, chest tightness, shortness of breath and wheezing  Cardiovascular: Negative for chest pain, palpitations and leg swelling     Gastrointestinal: Negative for abdominal pain, anal bleeding, constipation, diarrhea, nausea and vomiting  Endocrine: Negative for cold intolerance, heat intolerance and polyuria  Genitourinary: Negative for difficulty urinating and dysuria  Musculoskeletal: Negative for arthralgias, back pain and myalgias  Skin: Negative for rash  Neurological: Negative for dizziness and headaches  Psychiatric/Behavioral: Negative for agitation  Objective:      /60   Pulse 60   Temp 97 7 °F (36 5 °C) (Tympanic)   Resp 16   Ht 5' 1" (1 549 m)   Wt 59 8 kg (131 lb 12 8 oz)   BMI 24 90 kg/m²          Physical Exam   Constitutional: She appears well-developed  No distress  HENT:   Head: Normocephalic  Right Ear: External ear normal    Left Ear: External ear normal    Nose: Nose normal    Mouth/Throat: Oropharynx is clear and moist    Eyes: Conjunctivae are normal  Pupils are equal, round, and reactive to light  Right eye exhibits no discharge  Left eye exhibits no discharge  Neck: Normal range of motion  No thyromegaly present  Cardiovascular: Normal rate, regular rhythm and normal heart sounds  Exam reveals no gallop and no friction rub  No murmur heard  Pulmonary/Chest: Effort normal and breath sounds normal  No respiratory distress  She has no wheezes  She has no rales  She exhibits no tenderness  Abdominal: Soft  Bowel sounds are normal  She exhibits no distension and no mass  There is no tenderness  There is no rebound and no guarding  Musculoskeletal: Normal range of motion  She exhibits no edema, tenderness or deformity  Lymphadenopathy:     She has no cervical adenopathy  Neurological: She is alert  Psychiatric: She has a normal mood and affect

## 2018-10-04 ENCOUNTER — APPOINTMENT (OUTPATIENT)
Dept: LAB | Facility: HOSPITAL | Age: 83
End: 2018-10-04
Payer: MEDICARE

## 2018-10-04 ENCOUNTER — OFFICE VISIT (OUTPATIENT)
Dept: FAMILY MEDICINE CLINIC | Facility: CLINIC | Age: 83
End: 2018-10-04
Payer: MEDICARE

## 2018-10-04 VITALS
SYSTOLIC BLOOD PRESSURE: 144 MMHG | DIASTOLIC BLOOD PRESSURE: 62 MMHG | WEIGHT: 131 LBS | BODY MASS INDEX: 24.75 KG/M2 | TEMPERATURE: 98.5 F | RESPIRATION RATE: 16 BRPM | HEART RATE: 68 BPM

## 2018-10-04 DIAGNOSIS — M85.80 OSTEOPENIA, UNSPECIFIED LOCATION: ICD-10-CM

## 2018-10-04 DIAGNOSIS — I10 ESSENTIAL HYPERTENSION: ICD-10-CM

## 2018-10-04 DIAGNOSIS — R73.01 IMPAIRED FASTING GLUCOSE: ICD-10-CM

## 2018-10-04 DIAGNOSIS — E78.5 HYPERLIPIDEMIA, UNSPECIFIED HYPERLIPIDEMIA TYPE: ICD-10-CM

## 2018-10-04 DIAGNOSIS — Z23 NEED FOR INFLUENZA VACCINATION: ICD-10-CM

## 2018-10-04 DIAGNOSIS — I10 ESSENTIAL HYPERTENSION: Primary | ICD-10-CM

## 2018-10-04 LAB
ALBUMIN SERPL BCP-MCNC: 3.2 G/DL (ref 3.5–5)
ALP SERPL-CCNC: 52 U/L (ref 46–116)
ALT SERPL W P-5'-P-CCNC: 21 U/L (ref 12–78)
ANION GAP SERPL CALCULATED.3IONS-SCNC: 3 MMOL/L (ref 4–13)
AST SERPL W P-5'-P-CCNC: 18 U/L (ref 5–45)
BASOPHILS # BLD AUTO: 0.03 THOUSANDS/ΜL (ref 0–0.1)
BASOPHILS NFR BLD AUTO: 1 % (ref 0–1)
BILIRUB SERPL-MCNC: 1.13 MG/DL (ref 0.2–1)
BUN SERPL-MCNC: 16 MG/DL (ref 5–25)
CALCIUM SERPL-MCNC: 9.3 MG/DL (ref 8.3–10.1)
CHLORIDE SERPL-SCNC: 107 MMOL/L (ref 100–108)
CO2 SERPL-SCNC: 29 MMOL/L (ref 21–32)
CREAT SERPL-MCNC: 0.84 MG/DL (ref 0.6–1.3)
EOSINOPHIL # BLD AUTO: 0.06 THOUSAND/ΜL (ref 0–0.61)
EOSINOPHIL NFR BLD AUTO: 1 % (ref 0–6)
ERYTHROCYTE [DISTWIDTH] IN BLOOD BY AUTOMATED COUNT: 12.5 % (ref 11.6–15.1)
EST. AVERAGE GLUCOSE BLD GHB EST-MCNC: 100 MG/DL
GFR SERPL CREATININE-BSD FRML MDRD: 61 ML/MIN/1.73SQ M
GLUCOSE SERPL-MCNC: 78 MG/DL (ref 65–140)
HBA1C MFR BLD: 5.1 % (ref 4.2–6.3)
HCT VFR BLD AUTO: 45 % (ref 34.8–46.1)
HGB BLD-MCNC: 15 G/DL (ref 11.5–15.4)
IMM GRANULOCYTES # BLD AUTO: 0.01 THOUSAND/UL (ref 0–0.2)
IMM GRANULOCYTES NFR BLD AUTO: 0 % (ref 0–2)
LYMPHOCYTES # BLD AUTO: 1.29 THOUSANDS/ΜL (ref 0.6–4.47)
LYMPHOCYTES NFR BLD AUTO: 20 % (ref 14–44)
MCH RBC QN AUTO: 34 PG (ref 26.8–34.3)
MCHC RBC AUTO-ENTMCNC: 33.3 G/DL (ref 31.4–37.4)
MCV RBC AUTO: 102 FL (ref 82–98)
MONOCYTES # BLD AUTO: 0.34 THOUSAND/ΜL (ref 0.17–1.22)
MONOCYTES NFR BLD AUTO: 5 % (ref 4–12)
NEUTROPHILS # BLD AUTO: 4.85 THOUSANDS/ΜL (ref 1.85–7.62)
NEUTS SEG NFR BLD AUTO: 73 % (ref 43–75)
NRBC BLD AUTO-RTO: 0 /100 WBCS
PLATELET # BLD AUTO: 293 THOUSANDS/UL (ref 149–390)
PMV BLD AUTO: 9.4 FL (ref 8.9–12.7)
POTASSIUM SERPL-SCNC: 4 MMOL/L (ref 3.5–5.3)
PROT SERPL-MCNC: 6.3 G/DL (ref 6.4–8.2)
RBC # BLD AUTO: 4.41 MILLION/UL (ref 3.81–5.12)
SODIUM SERPL-SCNC: 139 MMOL/L (ref 136–145)
WBC # BLD AUTO: 6.58 THOUSAND/UL (ref 4.31–10.16)

## 2018-10-04 PROCEDURE — 85025 COMPLETE CBC W/AUTO DIFF WBC: CPT

## 2018-10-04 PROCEDURE — G0008 ADMIN INFLUENZA VIRUS VAC: HCPCS

## 2018-10-04 PROCEDURE — 99214 OFFICE O/P EST MOD 30 MIN: CPT | Performed by: FAMILY MEDICINE

## 2018-10-04 PROCEDURE — 90662 IIV NO PRSV INCREASED AG IM: CPT

## 2018-10-04 PROCEDURE — 36415 COLL VENOUS BLD VENIPUNCTURE: CPT

## 2018-10-04 PROCEDURE — 83036 HEMOGLOBIN GLYCOSYLATED A1C: CPT

## 2018-10-04 PROCEDURE — 80053 COMPREHEN METABOLIC PANEL: CPT

## 2018-10-04 NOTE — PROGRESS NOTES
Chief Complaint   Patient presents with    Follow-up     6 month follow up  Health Maintenance   Topic Date Due    Urinary Incontinence Screening  03/07/1991    DTaP,Tdap,and Td Vaccines (1 - Tdap) 10/09/2000    INFLUENZA VACCINE  09/01/2018    Fall Risk  03/29/2019    Depression Screening PHQ  03/29/2019    Pneumococcal PPSV23/PCV13 65+ Years / Low and Medium Risk  Completed     Assessment/Plan:  HTN----controlled  DASH diet  Hyperlipidemia---low fat diet  IFG---low carb diet  Will check labs  Osteopenia---continue multiple vitamins which including calcium and vitD  Give flu shot today  Got prevnar 2015  Got pneumovax 2017  Refused Dexa scan  Fall precautions  RTO in 6 months  Diagnoses and all orders for this visit:    Essential hypertension  -     CBC and differential; Future  -     Comprehensive metabolic panel; Future    Hyperlipidemia, unspecified hyperlipidemia type    Impaired fasting glucose  -     Hemoglobin A1C; Future    Osteopenia, unspecified location  -     Comprehensive metabolic panel; Future    Need for influenza vaccination  -     influenza vaccine, 6551-6178, high-dose, PF 0 5 mL, for patients 65 yr+ (FLUZONE HIGH-DOSE)          Subjective:      Patient ID: Marysol Shetty is a 80 y o  female  HPI  Pt is here with her   HTN---Pt does not check BP at home  Today /62 good  Denies headache, vision change, SOB or chest pain  Hyperlipidemia---She did not take fish oil for a while  Eat healthy per pt  Walk at home/going up and down stairs  IFG---low carb diet  Osteopenia---Pt is on multivitamin  VitD deficiency---9/2017 vitD 34 4 ok  FU opthalmology Dr Althea Apley every 3 months for glaucoma  Got shot every 3 months  Stable now  Lives with   Does all ADL's  No kids  Still drive  Denies recent falls  Denies depression           The following portions of the patient's history were reviewed and updated as appropriate: allergies, current medications, past family history, past medical history, past social history, past surgical history and problem list     Review of Systems   Constitutional: Negative for appetite change, chills and fever  HENT: Negative for congestion, ear pain, sinus pain and sore throat  Eyes: Negative for discharge and itching  Respiratory: Negative for apnea, cough, chest tightness, shortness of breath and wheezing  Cardiovascular: Negative for chest pain, palpitations and leg swelling  Gastrointestinal: Negative for abdominal pain, anal bleeding, constipation, diarrhea, nausea and vomiting  Endocrine: Negative for cold intolerance, heat intolerance and polyuria  Genitourinary: Negative for difficulty urinating and dysuria  Musculoskeletal: Negative for arthralgias, back pain and myalgias  Skin: Negative for rash  Neurological: Negative for dizziness and headaches  Psychiatric/Behavioral: Negative for agitation  Objective:      /62 (BP Location: Left arm, Patient Position: Sitting, Cuff Size: Standard)   Pulse 68   Temp 98 5 °F (36 9 °C) (Tympanic)   Resp 16   Wt 59 4 kg (131 lb)   BMI 24 75 kg/m²          Physical Exam   Constitutional: She appears well-developed  No distress  HENT:   Head: Normocephalic  Right Ear: External ear normal    Left Ear: External ear normal    Nose: Nose normal    Mouth/Throat: Oropharynx is clear and moist    Eyes: Pupils are equal, round, and reactive to light  Conjunctivae are normal  Right eye exhibits no discharge  Left eye exhibits no discharge  Neck: Normal range of motion  No thyromegaly present  Cardiovascular: Normal rate, regular rhythm and normal heart sounds  Exam reveals no gallop and no friction rub  No murmur heard  Pulmonary/Chest: Effort normal and breath sounds normal  No respiratory distress  She has no wheezes  She has no rales  She exhibits no tenderness  Abdominal: Soft   Bowel sounds are normal  Musculoskeletal: Normal range of motion  Lymphadenopathy:     She has no cervical adenopathy  Neurological: She is alert  Psychiatric: She has a normal mood and affect

## 2019-02-07 ENCOUNTER — OFFICE VISIT (OUTPATIENT)
Dept: FAMILY MEDICINE CLINIC | Facility: CLINIC | Age: 84
End: 2019-02-07
Payer: MEDICARE

## 2019-02-07 ENCOUNTER — APPOINTMENT (OUTPATIENT)
Dept: LAB | Facility: HOSPITAL | Age: 84
End: 2019-02-07
Payer: MEDICARE

## 2019-02-07 VITALS
SYSTOLIC BLOOD PRESSURE: 180 MMHG | BODY MASS INDEX: 24.73 KG/M2 | WEIGHT: 131 LBS | RESPIRATION RATE: 16 BRPM | HEART RATE: 64 BPM | TEMPERATURE: 98 F | DIASTOLIC BLOOD PRESSURE: 85 MMHG | HEIGHT: 61 IN

## 2019-02-07 DIAGNOSIS — I10 HYPERTENSION, UNSPECIFIED TYPE: Primary | ICD-10-CM

## 2019-02-07 DIAGNOSIS — I10 HYPERTENSION, UNSPECIFIED TYPE: ICD-10-CM

## 2019-02-07 LAB
ANION GAP SERPL CALCULATED.3IONS-SCNC: 4 MMOL/L (ref 4–13)
BUN SERPL-MCNC: 17 MG/DL (ref 5–25)
CALCIUM SERPL-MCNC: 9.2 MG/DL (ref 8.3–10.1)
CHLORIDE SERPL-SCNC: 109 MMOL/L (ref 100–108)
CO2 SERPL-SCNC: 27 MMOL/L (ref 21–32)
CREAT SERPL-MCNC: 0.98 MG/DL (ref 0.6–1.3)
GFR SERPL CREATININE-BSD FRML MDRD: 50 ML/MIN/1.73SQ M
GLUCOSE SERPL-MCNC: 86 MG/DL (ref 65–140)
POTASSIUM SERPL-SCNC: 3.5 MMOL/L (ref 3.5–5.3)
SODIUM SERPL-SCNC: 140 MMOL/L (ref 136–145)

## 2019-02-07 PROCEDURE — 80048 BASIC METABOLIC PNL TOTAL CA: CPT

## 2019-02-07 PROCEDURE — 36415 COLL VENOUS BLD VENIPUNCTURE: CPT

## 2019-02-07 PROCEDURE — 93000 ELECTROCARDIOGRAM COMPLETE: CPT | Performed by: FAMILY MEDICINE

## 2019-02-07 PROCEDURE — 99214 OFFICE O/P EST MOD 30 MIN: CPT | Performed by: FAMILY MEDICINE

## 2019-02-07 RX ORDER — LISINOPRIL 10 MG/1
10 TABLET ORAL DAILY
Qty: 90 TABLET | Refills: 1 | Status: SHIPPED | OUTPATIENT
Start: 2019-02-07 | End: 2019-04-12 | Stop reason: SDUPTHER

## 2019-02-07 NOTE — PROGRESS NOTES
Chief Complaint   Patient presents with    Hypertension     Patient was at the dentist and her BP was elevated they sent her over     Assessment/Plan:    BP elevated in office today  Pt denies symptoms like headache, vision change, SOB or chest pain  EKG today showed sinus rhythm, no acute ST-T change  Restart lisinopril 10mg QD  Check BMP in 2 weeks  DASH diet  Check BP at home 2/day and call office if BP always >140/90  RTO in 1 month with BP machine and BP log  Diagnoses and all orders for this visit:    Hypertension, unspecified type  -     POCT ECG  -     lisinopril (ZESTRIL) 10 mg tablet; Take 1 tablet (10 mg total) by mouth daily  -     Basic metabolic panel; Future          Subjective:      Patient ID: Alena Partida is a 80 y o  female  HPI  Pt is here with her   Pt states she saw her dentist this morning and her BP was 195/95  She feels fine  Denies headache, vision change, SOB or CP  Her dentist asked her to come here  Pt states she took metoprolol and lisinopril before  Her BP was better and she was off those medications for a while  The following portions of the patient's history were reviewed and updated as appropriate: allergies, current medications, past family history, past medical history, past social history, past surgical history and problem list     Review of Systems   Constitutional: Negative for appetite change, chills and fever  HENT: Negative for congestion, ear pain, sinus pain and sore throat  Eyes: Negative for discharge and itching  Respiratory: Negative for apnea, cough, chest tightness, shortness of breath and wheezing  Cardiovascular: Negative for chest pain, palpitations and leg swelling  Gastrointestinal: Negative for abdominal pain, anal bleeding, constipation, diarrhea, nausea and vomiting  Endocrine: Negative for cold intolerance, heat intolerance and polyuria  Genitourinary: Negative for difficulty urinating and dysuria  Musculoskeletal: Negative for arthralgias, back pain and myalgias  Skin: Negative for rash  Neurological: Negative for dizziness and headaches  Psychiatric/Behavioral: Negative for agitation  Objective:      BP (!) 180/85   Pulse 64   Temp 98 °F (36 7 °C) (Oral)   Resp 16   Ht 5' 1" (1 549 m)   Wt 59 4 kg (131 lb)   BMI 24 75 kg/m²          Physical Exam   Constitutional: She appears well-developed  No distress  HENT:   Head: Normocephalic  Right Ear: External ear normal    Left Ear: External ear normal    Nose: Nose normal    Mouth/Throat: Oropharynx is clear and moist    Eyes: Pupils are equal, round, and reactive to light  Conjunctivae are normal  Right eye exhibits no discharge  Left eye exhibits no discharge  Neck: Normal range of motion  No thyromegaly present  Cardiovascular: Normal rate, regular rhythm and normal heart sounds  Exam reveals no gallop and no friction rub  No murmur heard  Pulmonary/Chest: Effort normal and breath sounds normal  No respiratory distress  She has no wheezes  She has no rales  She exhibits no tenderness  Abdominal: Soft  Bowel sounds are normal    Musculoskeletal: Normal range of motion  She exhibits no edema  Lymphadenopathy:     She has no cervical adenopathy  Neurological: She is alert  Psychiatric: She has a normal mood and affect

## 2019-03-08 ENCOUNTER — OFFICE VISIT (OUTPATIENT)
Dept: FAMILY MEDICINE CLINIC | Facility: CLINIC | Age: 84
End: 2019-03-08
Payer: MEDICARE

## 2019-03-08 VITALS
HEART RATE: 60 BPM | BODY MASS INDEX: 25.19 KG/M2 | TEMPERATURE: 97.8 F | DIASTOLIC BLOOD PRESSURE: 74 MMHG | SYSTOLIC BLOOD PRESSURE: 160 MMHG | OXYGEN SATURATION: 98 % | WEIGHT: 133.4 LBS | RESPIRATION RATE: 16 BRPM | HEIGHT: 61 IN

## 2019-03-08 DIAGNOSIS — I10 ESSENTIAL HYPERTENSION: Primary | ICD-10-CM

## 2019-03-08 PROCEDURE — 99213 OFFICE O/P EST LOW 20 MIN: CPT | Performed by: FAMILY MEDICINE

## 2019-03-08 NOTE — PROGRESS NOTES
Chief Complaint   Patient presents with    Follow-up     4 week follow up  Health Maintenance   Topic Date Due    Urinary Incontinence Screening  03/07/1991    DTaP,Tdap,and Td Vaccines (1 - Tdap) 10/09/2000    Fall Risk  03/29/2019    Depression Screening PHQ  03/29/2019    Medicare Annual Wellness Visit (AWV)  03/29/2019    BMI: Adult  02/07/2020    INFLUENZA VACCINE  Completed    Pneumococcal PPSV23/PCV13 65+ Years / Low and Medium Risk  Completed    HEPATITIS B VACCINES  Aged Out     Assessment/Plan:    Patient's BP machine today showed 200/76  When I check, her BP was 160/74  I advised pt to get a new BP machine  DASH diet  Continue lisinopril 10mg QD    RTO as scheduled in 4/2019  Diagnoses and all orders for this visit:    Essential hypertension          Subjective:      Patient ID: Marilynn Blank is a 80 y o  female  HPI    Pt is here with her   Pt brought her BP machine and BP log  BP log showed at home 160-223/80-90  Pt states she feels fine  Denies headache, vision change, SOB or chest pain  Sometimes she likes salty food  Pt is on lisinopril 10mg QD  Today she did not take her medication yet  The following portions of the patient's history were reviewed and updated as appropriate: allergies, current medications, past family history, past medical history, past social history, past surgical history and problem list     Review of Systems   Constitutional: Negative for appetite change, chills and fever  HENT: Negative for congestion, ear pain, sinus pain and sore throat  Eyes: Negative for discharge and itching  Respiratory: Negative for apnea, cough, chest tightness, shortness of breath and wheezing  Cardiovascular: Negative for chest pain, palpitations and leg swelling  Gastrointestinal: Negative for abdominal pain, anal bleeding, constipation, diarrhea, nausea and vomiting     Endocrine: Negative for cold intolerance, heat intolerance and polyuria  Genitourinary: Negative for difficulty urinating and dysuria  Musculoskeletal: Negative for arthralgias, back pain and myalgias  Skin: Negative for rash  Neurological: Negative for dizziness and headaches  Psychiatric/Behavioral: Negative for agitation  Objective:      /74 (BP Location: Left arm, Patient Position: Sitting, Cuff Size: Standard)   Pulse 60   Temp 97 8 °F (36 6 °C) (Oral)   Resp 16   Ht 5' 1" (1 549 m)   Wt 60 5 kg (133 lb 6 4 oz)   SpO2 98%   BMI 25 21 kg/m²          Physical Exam   Constitutional: She appears well-developed  No distress  HENT:   Head: Normocephalic  Eyes: Pupils are equal, round, and reactive to light  Conjunctivae are normal  Right eye exhibits no discharge  Left eye exhibits no discharge  Neck: Normal range of motion  No thyromegaly present  Cardiovascular: Normal rate, regular rhythm and normal heart sounds  Exam reveals no gallop and no friction rub  No murmur heard  Pulmonary/Chest: Effort normal and breath sounds normal  No respiratory distress  She has no wheezes  She has no rales  She exhibits no tenderness  Abdominal: Soft  Bowel sounds are normal    Musculoskeletal: Normal range of motion  She exhibits no edema  Lymphadenopathy:     She has no cervical adenopathy  Neurological: She is alert  Psychiatric: She has a normal mood and affect

## 2019-04-12 ENCOUNTER — OFFICE VISIT (OUTPATIENT)
Dept: FAMILY MEDICINE CLINIC | Facility: CLINIC | Age: 84
End: 2019-04-12
Payer: MEDICARE

## 2019-04-12 VITALS
RESPIRATION RATE: 16 BRPM | DIASTOLIC BLOOD PRESSURE: 58 MMHG | SYSTOLIC BLOOD PRESSURE: 112 MMHG | HEART RATE: 60 BPM | HEIGHT: 61 IN | TEMPERATURE: 97.6 F | WEIGHT: 133 LBS | BODY MASS INDEX: 25.11 KG/M2

## 2019-04-12 DIAGNOSIS — E55.9 VITAMIN D DEFICIENCY: ICD-10-CM

## 2019-04-12 DIAGNOSIS — Z00.00 MEDICARE ANNUAL WELLNESS VISIT, SUBSEQUENT: ICD-10-CM

## 2019-04-12 DIAGNOSIS — I10 ESSENTIAL HYPERTENSION: Primary | ICD-10-CM

## 2019-04-12 DIAGNOSIS — E66.3 OVERWEIGHT (BMI 25.0-29.9): ICD-10-CM

## 2019-04-12 DIAGNOSIS — R73.01 IMPAIRED FASTING GLUCOSE: ICD-10-CM

## 2019-04-12 PROCEDURE — 99214 OFFICE O/P EST MOD 30 MIN: CPT | Performed by: FAMILY MEDICINE

## 2019-04-12 PROCEDURE — G0439 PPPS, SUBSEQ VISIT: HCPCS | Performed by: FAMILY MEDICINE

## 2019-04-12 RX ORDER — LISINOPRIL 10 MG/1
5 TABLET ORAL DAILY
Qty: 45 TABLET | Refills: 1
Start: 2019-04-12 | End: 2019-10-24 | Stop reason: ALTCHOICE

## 2019-04-15 ENCOUNTER — APPOINTMENT (OUTPATIENT)
Dept: LAB | Facility: HOSPITAL | Age: 84
End: 2019-04-15
Payer: MEDICARE

## 2019-04-15 DIAGNOSIS — R73.01 IMPAIRED FASTING GLUCOSE: ICD-10-CM

## 2019-04-15 DIAGNOSIS — E66.3 OVERWEIGHT (BMI 25.0-29.9): ICD-10-CM

## 2019-04-15 DIAGNOSIS — E55.9 VITAMIN D DEFICIENCY: ICD-10-CM

## 2019-04-15 DIAGNOSIS — I10 ESSENTIAL HYPERTENSION: ICD-10-CM

## 2019-04-15 LAB
25(OH)D3 SERPL-MCNC: 32.6 NG/ML (ref 30–100)
ALBUMIN SERPL BCP-MCNC: 3.4 G/DL (ref 3.5–5)
ALP SERPL-CCNC: 49 U/L (ref 46–116)
ALT SERPL W P-5'-P-CCNC: 23 U/L (ref 12–78)
ANION GAP SERPL CALCULATED.3IONS-SCNC: 5 MMOL/L (ref 4–13)
AST SERPL W P-5'-P-CCNC: 18 U/L (ref 5–45)
BASOPHILS # BLD AUTO: 0.03 THOUSANDS/ΜL (ref 0–0.1)
BASOPHILS NFR BLD AUTO: 1 % (ref 0–1)
BILIRUB SERPL-MCNC: 1.47 MG/DL (ref 0.2–1)
BUN SERPL-MCNC: 17 MG/DL (ref 5–25)
CALCIUM SERPL-MCNC: 9.1 MG/DL (ref 8.3–10.1)
CHLORIDE SERPL-SCNC: 108 MMOL/L (ref 100–108)
CO2 SERPL-SCNC: 29 MMOL/L (ref 21–32)
CREAT SERPL-MCNC: 0.95 MG/DL (ref 0.6–1.3)
EOSINOPHIL # BLD AUTO: 0.07 THOUSAND/ΜL (ref 0–0.61)
EOSINOPHIL NFR BLD AUTO: 1 % (ref 0–6)
ERYTHROCYTE [DISTWIDTH] IN BLOOD BY AUTOMATED COUNT: 12.3 % (ref 11.6–15.1)
EST. AVERAGE GLUCOSE BLD GHB EST-MCNC: 105 MG/DL
GFR SERPL CREATININE-BSD FRML MDRD: 52 ML/MIN/1.73SQ M
GLUCOSE P FAST SERPL-MCNC: 80 MG/DL (ref 65–99)
HBA1C MFR BLD: 5.3 % (ref 4.2–6.3)
HCT VFR BLD AUTO: 43.3 % (ref 34.8–46.1)
HGB BLD-MCNC: 14.7 G/DL (ref 11.5–15.4)
IMM GRANULOCYTES # BLD AUTO: 0.02 THOUSAND/UL (ref 0–0.2)
IMM GRANULOCYTES NFR BLD AUTO: 0 % (ref 0–2)
LYMPHOCYTES # BLD AUTO: 1.51 THOUSANDS/ΜL (ref 0.6–4.47)
LYMPHOCYTES NFR BLD AUTO: 24 % (ref 14–44)
MCH RBC QN AUTO: 34.6 PG (ref 26.8–34.3)
MCHC RBC AUTO-ENTMCNC: 33.9 G/DL (ref 31.4–37.4)
MCV RBC AUTO: 102 FL (ref 82–98)
MONOCYTES # BLD AUTO: 0.36 THOUSAND/ΜL (ref 0.17–1.22)
MONOCYTES NFR BLD AUTO: 6 % (ref 4–12)
NEUTROPHILS # BLD AUTO: 4.33 THOUSANDS/ΜL (ref 1.85–7.62)
NEUTS SEG NFR BLD AUTO: 68 % (ref 43–75)
NRBC BLD AUTO-RTO: 0 /100 WBCS
PLATELET # BLD AUTO: 276 THOUSANDS/UL (ref 149–390)
PMV BLD AUTO: 9.4 FL (ref 8.9–12.7)
POTASSIUM SERPL-SCNC: 4.2 MMOL/L (ref 3.5–5.3)
PROT SERPL-MCNC: 6.3 G/DL (ref 6.4–8.2)
RBC # BLD AUTO: 4.25 MILLION/UL (ref 3.81–5.12)
SODIUM SERPL-SCNC: 142 MMOL/L (ref 136–145)
TSH SERPL DL<=0.05 MIU/L-ACNC: 2.18 UIU/ML (ref 0.36–3.74)
WBC # BLD AUTO: 6.32 THOUSAND/UL (ref 4.31–10.16)

## 2019-04-15 PROCEDURE — 82306 VITAMIN D 25 HYDROXY: CPT

## 2019-04-15 PROCEDURE — 85025 COMPLETE CBC W/AUTO DIFF WBC: CPT

## 2019-04-15 PROCEDURE — 83036 HEMOGLOBIN GLYCOSYLATED A1C: CPT

## 2019-04-15 PROCEDURE — 84443 ASSAY THYROID STIM HORMONE: CPT

## 2019-04-15 PROCEDURE — 80053 COMPREHEN METABOLIC PANEL: CPT

## 2019-04-15 PROCEDURE — 36415 COLL VENOUS BLD VENIPUNCTURE: CPT

## 2019-09-26 ENCOUNTER — OFFICE VISIT (OUTPATIENT)
Dept: FAMILY MEDICINE CLINIC | Facility: CLINIC | Age: 84
End: 2019-09-26
Payer: MEDICARE

## 2019-09-26 VITALS
HEART RATE: 88 BPM | OXYGEN SATURATION: 97 % | RESPIRATION RATE: 16 BRPM | HEIGHT: 61 IN | SYSTOLIC BLOOD PRESSURE: 140 MMHG | BODY MASS INDEX: 24.81 KG/M2 | TEMPERATURE: 97.6 F | WEIGHT: 131.4 LBS | DIASTOLIC BLOOD PRESSURE: 75 MMHG

## 2019-09-26 DIAGNOSIS — J40 BRONCHITIS: Primary | ICD-10-CM

## 2019-09-26 PROCEDURE — 99213 OFFICE O/P EST LOW 20 MIN: CPT | Performed by: FAMILY MEDICINE

## 2019-09-26 RX ORDER — AZITHROMYCIN 250 MG/1
TABLET, FILM COATED ORAL
Qty: 6 TABLET | Refills: 0 | Status: SHIPPED | OUTPATIENT
Start: 2019-09-26 | End: 2019-09-30

## 2019-09-26 NOTE — PROGRESS NOTES
Chief Complaint   Patient presents with    Cold Like Symptoms     Symptoms productive cough, runny nose, and chest congestion since last week  Assessment/Plan:      A lot of fluids and rest  Tylenol or motrin for fever or pain  Give zpack  Side effects educated patient  Call office if symptoms no improving or worse  Diagnoses and all orders for this visit:    Bronchitis  -     azithromycin (ZITHROMAX) 250 mg tablet; Take 2 tabs on day 1, then take 1 tab daily from day 2-day 5  Subjective:      Patient ID: Miley Umana is a 80 y o  female  HPI    Pt is here by herself  C/o cold last week, got better  Then she felt cold symptoms coming back  Cough for 3 days  Dry cough, no wheezing, no Sob  Denies fever, chest pain, n/v/abd pain  No smoking  Denies hx of COPD or asthma  The following portions of the patient's history were reviewed and updated as appropriate: allergies, current medications, past family history, past medical history, past social history, past surgical history and problem list     Review of Systems   Constitutional: Negative for appetite change, chills and fever  HENT: Positive for sore throat  Negative for congestion, ear pain and sinus pain  Eyes: Negative for discharge and itching  Respiratory: Positive for cough  Negative for apnea, chest tightness, shortness of breath and wheezing  Cardiovascular: Negative for chest pain, palpitations and leg swelling  Gastrointestinal: Negative for abdominal pain, anal bleeding, constipation, diarrhea, nausea and vomiting  Endocrine: Negative for cold intolerance, heat intolerance and polyuria  Genitourinary: Negative for difficulty urinating and dysuria  Musculoskeletal: Negative for arthralgias, back pain and myalgias  Skin: Negative for rash  Neurological: Negative for dizziness and headaches  Psychiatric/Behavioral: Negative for agitation           Objective:      /75 (BP Location: Left arm, Patient Position: Sitting, Cuff Size: Adult)   Pulse 88   Temp 97 6 °F (36 4 °C) (Tympanic)   Resp 16   Ht 5' 1" (1 549 m)   Wt 59 6 kg (131 lb 6 4 oz)   SpO2 97%   BMI 24 83 kg/m²          Physical Exam   Constitutional: She appears well-developed  No distress  HENT:   Head: Normocephalic  Right Ear: External ear normal    Left Ear: External ear normal    Mouth/Throat: Oropharynx is clear and moist    B/l nasal swollen   Eyes: Pupils are equal, round, and reactive to light  Conjunctivae are normal  Right eye exhibits no discharge  Left eye exhibits no discharge  Neck: Normal range of motion  No thyromegaly present  Cardiovascular: Normal rate, regular rhythm and normal heart sounds  Exam reveals no gallop and no friction rub  No murmur heard  Pulmonary/Chest: Effort normal and breath sounds normal  No respiratory distress  She has no wheezes  She has no rales  She exhibits no tenderness  Abdominal: Soft  Bowel sounds are normal    Musculoskeletal: Normal range of motion  She exhibits no edema  Lymphadenopathy:     She has no cervical adenopathy  Neurological: She is alert  Psychiatric: She has a normal mood and affect

## 2019-10-24 ENCOUNTER — OFFICE VISIT (OUTPATIENT)
Dept: FAMILY MEDICINE CLINIC | Facility: CLINIC | Age: 84
End: 2019-10-24
Payer: MEDICARE

## 2019-10-24 VITALS
DIASTOLIC BLOOD PRESSURE: 82 MMHG | SYSTOLIC BLOOD PRESSURE: 150 MMHG | RESPIRATION RATE: 16 BRPM | WEIGHT: 131.6 LBS | BODY MASS INDEX: 24.84 KG/M2 | OXYGEN SATURATION: 96 % | HEIGHT: 61 IN | TEMPERATURE: 97.6 F | HEART RATE: 68 BPM

## 2019-10-24 DIAGNOSIS — E78.5 HYPERLIPIDEMIA, UNSPECIFIED HYPERLIPIDEMIA TYPE: ICD-10-CM

## 2019-10-24 DIAGNOSIS — I10 ESSENTIAL HYPERTENSION: Primary | ICD-10-CM

## 2019-10-24 DIAGNOSIS — Z23 NEED FOR INFLUENZA VACCINATION: ICD-10-CM

## 2019-10-24 DIAGNOSIS — E55.9 VITAMIN D DEFICIENCY: ICD-10-CM

## 2019-10-24 DIAGNOSIS — R73.01 IMPAIRED FASTING GLUCOSE: ICD-10-CM

## 2019-10-24 PROBLEM — R53.83 FATIGUE: Status: RESOLVED | Noted: 2017-05-15 | Resolved: 2019-10-24

## 2019-10-24 PROCEDURE — 99214 OFFICE O/P EST MOD 30 MIN: CPT | Performed by: FAMILY MEDICINE

## 2019-10-24 PROCEDURE — G0008 ADMIN INFLUENZA VIRUS VAC: HCPCS

## 2019-10-24 PROCEDURE — 90662 IIV NO PRSV INCREASED AG IM: CPT

## 2019-10-24 RX ORDER — LISINOPRIL 2.5 MG/1
2.5 TABLET ORAL DAILY
Qty: 30 TABLET | Refills: 5 | Status: SHIPPED | OUTPATIENT
Start: 2019-10-24 | End: 2020-02-14 | Stop reason: SDUPTHER

## 2019-10-24 NOTE — ASSESSMENT & PLAN NOTE
Pt stopped lisinopril for several months  She states she felt dizzy when she took lisinopril 5mg QD  BP elevated in office today  Pt denies symptoms like headache, vision change, SOB or chest pain  DASH diet  Check BP at home 2/day and call office if BP always >140/90  Give lisinopril 2 5mg QD  SE educated pt

## 2019-10-24 NOTE — PROGRESS NOTES
Chief Complaint   Patient presents with    Follow-up     6 months  Health Maintenance   Topic Date Due    DTaP,Tdap,and Td Vaccines (1 - Tdap) 10/09/2000    INFLUENZA VACCINE  07/01/2019    Fall Risk  04/12/2020    Depression Screening PHQ  04/12/2020    Urinary Incontinence Screening  04/12/2020    Medicare Annual Wellness Visit (AWV)  04/12/2020    BMI: Adult  09/26/2020    Pneumococcal Vaccine: 65+ Years  Completed    Pneumococcal Vaccine: Pediatrics (0 to 5 Years) and At-Risk Patients (6 to 59 Years)  Aged Out    HEPATITIS B VACCINES  Aged Out     Assessment/Plan:    Hypertension  Pt stopped lisinopril for several months  She states she felt dizzy when she took lisinopril 5mg QD  BP elevated in office today  Pt denies symptoms like headache, vision change, SOB or chest pain  DASH diet  Check BP at home 2/day and call office if BP always >140/90  Give lisinopril 2 5mg QD  SE educated pt  Impaired fasting glucose  4/2019 hgA1C 5 3 normal  Low carb diet  Hyperlipidemia  Advised pt to follow low fat diet  Vitamin D deficiency  4/2019 Vit D 32 normal  Continue multivitamin supplement       Got flu shot yearly  Got prevnar 2015  Got pneumovax 2017  Refused Dexa scan  Fall precautions  RTO in 6 months    Filled out 's form       POA---  Living will---DNR per pt  Diagnoses and all orders for this visit:    Essential hypertension  -     lisinopril (ZESTRIL) 2 5 mg tablet; Take 1 tablet (2 5 mg total) by mouth daily  -     Basic metabolic panel; Future    Impaired fasting glucose    Hyperlipidemia, unspecified hyperlipidemia type    Vitamin D deficiency    Need for influenza vaccination  -     influenza vaccine, 2681-6634, high-dose, PF 0 5 mL (FLUZONE HIGH-DOSE)          Subjective:      Patient ID: You Salmon is a 80 y o  female  HPI    Pt is here with her   HTN---Pt does not check BP at home  Today /82   Pt states sometimes she feels dizzy if she takes lisinopril  She stopped it for several months and feels fine  Denies headache, vision change, SOB or chest pain       Hyperlipidemia---She did not take fish oil for a while  Eat healthy per pt  Walk at home/going up and down stairs every day       IFG---Follows low carb diet       VitD deficiency---She is on multivitamin daily       FU opthalmology Dr Saturnino Romero every 3 months for glaucoma  Got shot every 3 months  Stable now       Lives with   Does all ADL's  No kids    Still drive, only drive locally to [employer] or pharmacy  Does not drive at night  Denies recent falls  Denies depression        The following portions of the patient's history were reviewed and updated as appropriate: allergies, current medications, past family history, past medical history, past social history, past surgical history and problem list     Review of Systems   Constitutional: Negative for appetite change, chills, fatigue and fever  HENT: Negative for congestion, ear pain, sinus pain and sore throat  Eyes: Negative for discharge and itching  Respiratory: Negative for apnea, cough, chest tightness, shortness of breath and wheezing  Cardiovascular: Negative for chest pain, palpitations and leg swelling  Gastrointestinal: Negative for abdominal pain, anal bleeding, constipation, diarrhea, nausea and vomiting  Endocrine: Negative for cold intolerance, heat intolerance and polyuria  Genitourinary: Negative for difficulty urinating and dysuria  Musculoskeletal: Negative for arthralgias, back pain and myalgias  Skin: Negative for rash  Neurological: Positive for dizziness  Negative for headaches  Psychiatric/Behavioral: Negative for agitation           Objective:      /82 (BP Location: Left arm, Patient Position: Sitting, Cuff Size: Adult)   Pulse 68   Temp 97 6 °F (36 4 °C) (Tympanic)   Resp 16   Ht 5' 1" (1 549 m)   Wt 59 7 kg (131 lb 9 6 oz)   SpO2 96%   BMI 24 87 kg/m² Physical Exam   Constitutional: She appears well-developed  No distress  HENT:   Head: Normocephalic  Right Ear: External ear normal    Left Ear: External ear normal    Nose: Nose normal    Mouth/Throat: Oropharynx is clear and moist    Eyes: Pupils are equal, round, and reactive to light  Conjunctivae are normal  Right eye exhibits no discharge  Left eye exhibits no discharge  Neck: Normal range of motion  No thyromegaly present  Cardiovascular: Normal rate, regular rhythm and normal heart sounds  Exam reveals no gallop and no friction rub  No murmur heard  Pulmonary/Chest: Effort normal and breath sounds normal  No respiratory distress  She has no wheezes  She has no rales  She exhibits no tenderness  Abdominal: Soft  Bowel sounds are normal    Musculoskeletal: Normal range of motion  She exhibits no edema  Lymphadenopathy:     She has no cervical adenopathy  Neurological: She is alert  Psychiatric: She has a normal mood and affect

## 2020-02-14 ENCOUNTER — OFFICE VISIT (OUTPATIENT)
Dept: FAMILY MEDICINE CLINIC | Facility: CLINIC | Age: 85
End: 2020-02-14
Payer: MEDICARE

## 2020-02-14 VITALS
SYSTOLIC BLOOD PRESSURE: 160 MMHG | BODY MASS INDEX: 24.51 KG/M2 | DIASTOLIC BLOOD PRESSURE: 70 MMHG | RESPIRATION RATE: 12 BRPM | HEART RATE: 72 BPM | OXYGEN SATURATION: 98 % | HEIGHT: 61 IN | TEMPERATURE: 97.2 F | WEIGHT: 129.8 LBS

## 2020-02-14 DIAGNOSIS — I10 ESSENTIAL HYPERTENSION: ICD-10-CM

## 2020-02-14 PROCEDURE — 1036F TOBACCO NON-USER: CPT | Performed by: FAMILY MEDICINE

## 2020-02-14 PROCEDURE — 99213 OFFICE O/P EST LOW 20 MIN: CPT | Performed by: FAMILY MEDICINE

## 2020-02-14 PROCEDURE — 1160F RVW MEDS BY RX/DR IN RCRD: CPT | Performed by: FAMILY MEDICINE

## 2020-02-14 PROCEDURE — 4040F PNEUMOC VAC/ADMIN/RCVD: CPT | Performed by: FAMILY MEDICINE

## 2020-02-14 PROCEDURE — 3008F BODY MASS INDEX DOCD: CPT | Performed by: FAMILY MEDICINE

## 2020-02-14 PROCEDURE — 3078F DIAST BP <80 MM HG: CPT | Performed by: FAMILY MEDICINE

## 2020-02-14 PROCEDURE — 3077F SYST BP >= 140 MM HG: CPT | Performed by: FAMILY MEDICINE

## 2020-02-14 RX ORDER — LISINOPRIL 2.5 MG/1
2.5 TABLET ORAL DAILY
Qty: 90 TABLET | Refills: 1 | Status: SHIPPED | OUTPATIENT
Start: 2020-02-14 | End: 2021-08-13 | Stop reason: ALTCHOICE

## 2020-02-14 NOTE — PROGRESS NOTES
Chief Complaint   Patient presents with    Hypertension     Referred by dentist for Elevated /76 right arm, 173/63 20 mins later, and 165/66 left arm  Assessment/Plan:    BP elevated in office today  Pt denies symptoms like headache, vision change, SOB or chest pain  DASH diet  Give lisinopril 2 5mg QD  Check BP at home 2/day and call office if BP always >140/90  Diagnoses and all orders for this visit:    Essential hypertension  -     lisinopril (ZESTRIL) 2 5 mg tablet; Take 1 tablet (2 5 mg total) by mouth daily          Subjective:      Patient ID: Jaison Coe is a 80 y o  female  HPI    Pt is here with her   Pt states she went to dentist office yesterday, BP was 182/76 and 173/63  Pt states she feels fine  Denies headache, chest pain, SOB  Pt states she stopped lisinopril since 10/2019  She does not take any BP medication now  The following portions of the patient's history were reviewed and updated as appropriate: allergies, current medications, past family history, past medical history, past social history, past surgical history and problem list     Review of Systems   Constitutional: Negative for appetite change, chills and fever  HENT: Negative for congestion, ear pain, sinus pain and sore throat  Eyes: Negative for discharge and itching  Respiratory: Negative for apnea, cough, chest tightness, shortness of breath and wheezing  Cardiovascular: Negative for chest pain, palpitations and leg swelling  Gastrointestinal: Negative for abdominal pain, anal bleeding, constipation, diarrhea, nausea and vomiting  Endocrine: Negative for cold intolerance, heat intolerance and polyuria  Genitourinary: Negative for difficulty urinating and dysuria  Musculoskeletal: Negative for arthralgias, back pain and myalgias  Skin: Negative for rash  Neurological: Negative for dizziness and headaches  Psychiatric/Behavioral: Negative for agitation  Objective:      /70 (BP Location: Left arm, Patient Position: Sitting, Cuff Size: Adult)   Pulse 72   Temp (!) 97 2 °F (36 2 °C) (Tympanic)   Resp 12   Ht 5' 1" (1 549 m)   Wt 58 9 kg (129 lb 12 8 oz)   SpO2 98%   BMI 24 53 kg/m²          Physical Exam   Constitutional: She appears well-developed  No distress  HENT:   Head: Normocephalic  Right Ear: External ear normal    Left Ear: External ear normal    Nose: Nose normal    Mouth/Throat: Oropharynx is clear and moist    Eyes: Pupils are equal, round, and reactive to light  Conjunctivae are normal  Right eye exhibits no discharge  Left eye exhibits no discharge  Neck: Normal range of motion  No thyromegaly present  Cardiovascular: Normal rate, regular rhythm and normal heart sounds  Exam reveals no gallop and no friction rub  No murmur heard  Pulmonary/Chest: Effort normal and breath sounds normal  No respiratory distress  She has no wheezes  She has no rales  She exhibits no tenderness  Abdominal: Soft  Bowel sounds are normal  She exhibits no distension and no mass  There is no tenderness  There is no rebound and no guarding  Musculoskeletal: Normal range of motion  She exhibits no edema, tenderness or deformity  Lymphadenopathy:     She has no cervical adenopathy  Neurological: She is alert  Psychiatric: She has a normal mood and affect

## 2021-03-04 ENCOUNTER — IMMUNIZATIONS (OUTPATIENT)
Dept: FAMILY MEDICINE CLINIC | Facility: HOSPITAL | Age: 86
End: 2021-03-04

## 2021-03-04 DIAGNOSIS — Z23 ENCOUNTER FOR IMMUNIZATION: Primary | ICD-10-CM

## 2021-03-04 PROCEDURE — 91300 SARS-COV-2 / COVID-19 MRNA VACCINE (PFIZER-BIONTECH) 30 MCG: CPT

## 2021-03-04 PROCEDURE — 0001A SARS-COV-2 / COVID-19 MRNA VACCINE (PFIZER-BIONTECH) 30 MCG: CPT

## 2021-03-25 ENCOUNTER — IMMUNIZATIONS (OUTPATIENT)
Dept: FAMILY MEDICINE CLINIC | Facility: HOSPITAL | Age: 86
End: 2021-03-25

## 2021-03-25 DIAGNOSIS — Z23 ENCOUNTER FOR IMMUNIZATION: Primary | ICD-10-CM

## 2021-03-25 PROCEDURE — 0002A SARS-COV-2 / COVID-19 MRNA VACCINE (PFIZER-BIONTECH) 30 MCG: CPT

## 2021-03-25 PROCEDURE — 91300 SARS-COV-2 / COVID-19 MRNA VACCINE (PFIZER-BIONTECH) 30 MCG: CPT

## 2021-06-01 ENCOUNTER — HOSPITAL ENCOUNTER (EMERGENCY)
Facility: HOSPITAL | Age: 86
Discharge: HOME | End: 2021-06-01
Payer: MEDICARE

## 2021-06-01 VITALS
TEMPERATURE: 98.2 F | WEIGHT: 129 LBS | SYSTOLIC BLOOD PRESSURE: 218 MMHG | RESPIRATION RATE: 18 BRPM | DIASTOLIC BLOOD PRESSURE: 92 MMHG | HEIGHT: 61 IN | BODY MASS INDEX: 24.35 KG/M2 | HEART RATE: 79 BPM | OXYGEN SATURATION: 96 %

## 2021-06-01 DIAGNOSIS — H61.23 BILATERAL IMPACTED CERUMEN: Primary | ICD-10-CM

## 2021-06-01 PROCEDURE — 99282 EMERGENCY DEPT VISIT SF MDM: CPT

## 2021-06-01 PROCEDURE — 99284 EMERGENCY DEPT VISIT MOD MDM: CPT | Performed by: PHYSICIAN ASSISTANT

## 2021-06-01 PROCEDURE — 69210 REMOVE IMPACTED EAR WAX UNI: CPT | Performed by: PHYSICIAN ASSISTANT

## 2021-06-01 RX ORDER — NEOMYCIN SULFATE, POLYMYXIN B SULFATE AND HYDROCORTISONE 10; 3.5; 1 MG/ML; MG/ML; [USP'U]/ML
4 SUSPENSION/ DROPS AURICULAR (OTIC) 3 TIMES DAILY
Qty: 3 ML | Refills: 0 | Status: SHIPPED | OUTPATIENT
Start: 2021-06-01 | End: 2021-08-13 | Stop reason: ALTCHOICE

## 2021-06-01 NOTE — ED PROVIDER NOTES
History  Chief Complaint   Patient presents with    Ear Problem     pt states she woke up this morning and couldn't hear anything  states shes never had a problem hearing before     Gene Davenport is a 89yo presents to the ED today with muffled ear sounds both ears  started this morning  No tinnitus  No trauma  No headache  No dizziness/sycnope  No vision changes  No fevers  No other acute complaints  Prior to Admission Medications   Prescriptions Last Dose Informant Patient Reported? Taking? Multiple Vitamins-Minerals (ICAPS MV PO)  Self Yes No   Sig: Take by mouth   bimatoprost (LUMIGAN) 0 01 % ophthalmic drops  Self Yes No   Sig: Administer 1 drop to the right eye daily at bedtime   lisinopril (ZESTRIL) 2 5 mg tablet   No No   Sig: Take 1 tablet (2 5 mg total) by mouth daily      Facility-Administered Medications: None       Past Medical History:   Diagnosis Date    Anosmia     Cellulitis     Diverticulitis, colon     Fatigue 5/15/2017    Generalized osteoarthritis of multiple sites     Hyperlipidemia     Hypertension     Impacted cerumen of both ears     Lyme disease     Macular degeneration     Tremor        Past Surgical History:   Procedure Laterality Date    BREAST BIOPSY      CATARACT EXTRACTION      HYSTERECTOMY         Family History   Problem Relation Age of Onset    Lung cancer Father      I have reviewed and agree with the history as documented  E-Cigarette/Vaping     E-Cigarette/Vaping Substances     Social History     Tobacco Use    Smoking status: Never Smoker    Smokeless tobacco: Never Used   Substance Use Topics    Alcohol use: Yes     Alcohol/week: 5 0 standard drinks     Types: 5 Glasses of wine per week     Comment: social    Drug use: No       Review of Systems   Constitutional: Negative for fatigue and fever  HENT: Positive for hearing loss  Negative for ear discharge, ear pain, facial swelling and sore throat  Eyes: Negative for visual disturbance  Respiratory: Negative for cough and shortness of breath  Cardiovascular: Negative for chest pain  Gastrointestinal: Negative for abdominal pain and vomiting  Musculoskeletal: Negative for gait problem  Neurological: Negative for dizziness, syncope, facial asymmetry and headaches  Psychiatric/Behavioral: Negative for behavioral problems  All other systems reviewed and are negative  Physical Exam  Physical Exam  Vitals signs and nursing note reviewed  Constitutional:       General: She is not in acute distress  Appearance: She is well-developed  She is not diaphoretic  HENT:      Head: Normocephalic and atraumatic  Right Ear: There is impacted cerumen  Left Ear: There is impacted cerumen  Eyes:      Conjunctiva/sclera: Conjunctivae normal    Neck:      Musculoskeletal: Normal range of motion and neck supple  Cardiovascular:      Rate and Rhythm: Normal rate and regular rhythm  Heart sounds: Normal heart sounds  No murmur  Pulmonary:      Effort: Pulmonary effort is normal  No respiratory distress  Breath sounds: Normal breath sounds  Musculoskeletal: Normal range of motion  Skin:     General: Skin is warm and dry  Neurological:      General: No focal deficit present  Mental Status: She is alert and oriented to person, place, and time  Mental status is at baseline  Cranial Nerves: No cranial nerve deficit  Motor: No weakness        Gait: Gait normal    Psychiatric:         Mood and Affect: Mood normal          Behavior: Behavior normal          Vital Signs  ED Triage Vitals [06/01/21 1356]   Temperature Pulse Respirations Blood Pressure SpO2   98 2 °F (36 8 °C) 79 18 (!) 218/92 96 %      Temp Source Heart Rate Source Patient Position - Orthostatic VS BP Location FiO2 (%)   Oral Monitor Sitting Right arm --      Pain Score       --           Vitals:    06/01/21 1356   BP: (!) 218/92   Pulse: 79   Patient Position - Orthostatic VS: Sitting Visual Acuity      ED Medications  Medications - No data to display    Diagnostic Studies  Results Reviewed     None                 No orders to display              Procedures  Ear cerumen removal    Date/Time: 6/1/2021 3:19 PM  Performed by: Marlan Essex, PA-C  Authorized by: Marlan Essex, PA-C   Universal Protocol:  Consent: Verbal consent obtained  Patient location:  ED  Procedure details:     Local anesthetic:  None    Location:  L ear and R ear    Procedure type: irrigation with instrumentation      Procedure type comment:  Hydrogen peroxide with warm water    Instrumentation: curette      Approach:  External    Visualization (free text):  Otoscope    Equipment used:  20cc synringe with 20g plastic cath tip   Post-procedure details:     Complication:  None    Hearing quality:  Improved    Patient tolerance of procedure: Tolerated well, no immediate complications             ED Course                                           MDM    Disposition  Final diagnoses:   Bilateral impacted cerumen     Time reflects when diagnosis was documented in both MDM as applicable and the Disposition within this note     Time User Action Codes Description Comment    6/1/2021  2:59 PM Vernal Quest Add [H61 [id number]] Bilateral impacted cerumen       ED Disposition     ED Disposition Condition Date/Time Comment    Discharge Stable Tue Jun 1, 2021  2:59 PM Vincentownjoceline Andrade discharge to home/self care  Follow-up Information     Follow up With Specialties Details Why Contact Info Additional Information    Santana Walters MD Otolaryngology Schedule an appointment as soon as possible for a visit in 1 week As needed [address] [facility]    [address] [facility] Emergency Department Emergency Medicine  If symptoms worsen [address]  [address] [place] [address] Emergency Department, [address] [place] [address], [id number]   [phone]          Patient's Medications   Discharge Prescriptions    CARBAMIDE PEROXIDE (DEBROX) 6 5 % OTIC SOLUTION    Administer 5 drops into ears 2 (two) times a day       Start Date: 6/1/2021  End Date: --       Order Dose: 5 drops       Quantity: 15 mL    Refills: 0    NEOMYCIN-POLYMYXIN-HYDROCORTISONE (CORTISPORIN) 0 35%-10,000 UNITS/ML-1% OTIC SUSPENSION    Administer 4 drops into both ears 3 (three) times a day for 5 days       Start Date: 6/1/2021  End Date: 6/6/2021       Order Dose: 4 drops       Quantity: 3 mL    Refills: 0     No discharge procedures on file      PDMP Review     None          ED Provider  Electronically Signed by           Eppie Koyanagi, PA-C  06/01/21 2964

## 2021-08-13 ENCOUNTER — OFFICE VISIT (OUTPATIENT)
Dept: FAMILY MEDICINE CLINIC | Facility: CLINIC | Age: 86
End: 2021-08-13
Payer: MEDICARE

## 2021-08-13 VITALS
HEIGHT: 61 IN | HEART RATE: 76 BPM | BODY MASS INDEX: 25.15 KG/M2 | OXYGEN SATURATION: 98 % | WEIGHT: 133.2 LBS | SYSTOLIC BLOOD PRESSURE: 122 MMHG | DIASTOLIC BLOOD PRESSURE: 70 MMHG | TEMPERATURE: 99 F | RESPIRATION RATE: 16 BRPM

## 2021-08-13 DIAGNOSIS — R21 FACIAL RASH: Primary | ICD-10-CM

## 2021-08-13 PROCEDURE — 1124F ACP DISCUSS-NO DSCNMKR DOCD: CPT | Performed by: FAMILY MEDICINE

## 2021-08-13 PROCEDURE — 99213 OFFICE O/P EST LOW 20 MIN: CPT | Performed by: FAMILY MEDICINE

## 2021-08-13 RX ORDER — METHYLPREDNISOLONE 4 MG/1
TABLET ORAL
Qty: 21 EACH | Refills: 0 | Status: SHIPPED | OUTPATIENT
Start: 2021-08-13 | End: 2022-03-09 | Stop reason: ALTCHOICE

## 2021-08-13 NOTE — PATIENT INSTRUCTIONS
You have a rash and swelling of the face  It may be due to a allergic reaction  We will start you on steroids to decrease the swelling  Medrol-dose moi  Also I want you to start taking Claritin everyday  Please start using debrox ear drops in the left ear to help get rid of ear wax

## 2021-08-13 NOTE — PROGRESS NOTES
Chief Complaint   Patient presents with    Facial Swelling     Redness & Swelling      Health Maintenance   Topic Date Due    DTaP,Tdap,and Td Vaccines (1 - Tdap) 03/07/1947    Medicare Annual Wellness Visit (AWV)  04/12/2020    BMI: Followup Plan  04/12/2020    Influenza Vaccine (1) 09/01/2021    Fall Risk  08/13/2022    Depression Screening PHQ  08/13/2022    BMI: Adult  08/13/2022    Pneumococcal Vaccine: 65+ Years  Completed    COVID-19 Vaccine  Completed    HIB Vaccine  Aged Out    Hepatitis B Vaccine  Aged Out    IPV Vaccine  Aged Out    Hepatitis A Vaccine  Aged Out    Meningococcal ACWY Vaccine  Aged Out    HPV Vaccine  Aged Out                Assessment/Plan:    No problem-specific Assessment & Plan notes found for this encounter  Diagnoses and all orders for this visit:    Facial rash  -     methylPREDNISolone 4 MG tablet therapy pack; Use as directed on package        Facial rash:  Patient's history of physical does not reveal any particular etiology of facial rash and swelling  There is nothing new in patient's life that might cause an allergic reaction  This could be some sort of environmental allergen that the patient came in contact with [place]  No red flag symptoms to suggest anything life-threatening  Patient is still able to swallow, see, and here  Will start with  Claritin by mouth daily and also medrol-Dosepak to help with the swelling and redness  Advised patient that if she were to experience any red flag symptoms such as swelling of the eyes, difficulty swallowing or with speech, increased pain - that patient should return to clinic or go to the emergency room for further evaluation  Advised patient that if she is not better after treatment with Claritin and Medrol Dosepak that she should return to office in 2 weeks  Subjective:      Patient ID: Beverly Beatty is a 80 y o  female      HPI    Patient started to have rash on face 2 days ago  Rash is erythematous and has associated mild swelling  Nontender  Nothing seems to make symptoms better or worse  Patient has not had any sick contacts  States home with   No new medications, pets, detergents, lotions, moisturizers, makeup  Patient has only been taking a eyedrops that has been prescribed by ophthalmologist, medication has been taken for over year  Also previously taking lisinopril 2 5 mg but that was discontinued by herself a month ago  Blood pressure is stable today at 122/70  No allergies to report  No travel to anywhere  No similar prior history  The following portions of the patient's history were reviewed and updated as appropriate: allergies, current medications, past family history, past medical history, past social history, past surgical history and problem list     Review of Systems   Constitutional: Negative for chills and fever  HENT: Positive for facial swelling  Negative for postnasal drip and sore throat  Eyes: Negative for visual disturbance  Respiratory: Negative for cough and wheezing  Cardiovascular: Negative for chest pain and palpitations  Gastrointestinal: Negative for diarrhea, nausea and vomiting  Genitourinary: Negative for dysuria  Musculoskeletal: Negative for arthralgias and myalgias  Skin: Positive for rash (on face)  Neurological: Negative for dizziness, light-headedness and headaches  Hematological: Does not bruise/bleed easily  Objective:      /70 (BP Location: Left arm, Patient Position: Sitting, Cuff Size: Adult)   Pulse 76   Temp 99 °F (37 2 °C) (Tympanic)   Resp 16   Ht 5' 1" (1 549 m)   Wt 60 4 kg (133 lb 3 2 oz)   SpO2 98%   BMI 25 17 kg/m²          Physical Exam  Vitals and nursing note reviewed  Constitutional:       General: She is not in acute distress  HENT:      Head: Normocephalic and atraumatic        Right Ear: Tympanic membrane normal       Left Ear: Tympanic membrane normal  Nose: Nose normal       Mouth/Throat:      Mouth: Mucous membranes are moist       Pharynx: Oropharynx is clear  No oropharyngeal exudate  Comments:   Able to swallow  Eyes:      Conjunctiva/sclera: Conjunctivae normal       Pupils: Pupils are equal, round, and reactive to light  Cardiovascular:      Rate and Rhythm: Normal rate and regular rhythm  Heart sounds: No murmur heard  Pulmonary:      Effort: Pulmonary effort is normal       Breath sounds: Normal breath sounds  No wheezing  Abdominal:      Palpations: Abdomen is soft  Musculoskeletal:      Cervical back: Neck supple  Lymphadenopathy:      Cervical: No cervical adenopathy  Skin:     General: Skin is warm and dry  Capillary Refill: Capillary refill takes less than 2 seconds  Comments:  Macular rash over forehead cheeks nose and chin  Slightly warm to touch but nontender  No wounds or discharge noted  Mildly swollen  Neurological:      Mental Status: She is alert  Cranial Nerves: No cranial nerve deficit

## 2022-03-09 ENCOUNTER — OFFICE VISIT (OUTPATIENT)
Dept: FAMILY MEDICINE CLINIC | Facility: CLINIC | Age: 87
End: 2022-03-09
Payer: MEDICARE

## 2022-03-09 VITALS
BODY MASS INDEX: 26.91 KG/M2 | HEART RATE: 84 BPM | OXYGEN SATURATION: 97 % | HEIGHT: 58 IN | TEMPERATURE: 98.3 F | RESPIRATION RATE: 20 BRPM | SYSTOLIC BLOOD PRESSURE: 140 MMHG | WEIGHT: 128.2 LBS | DIASTOLIC BLOOD PRESSURE: 78 MMHG

## 2022-03-09 DIAGNOSIS — I10 PRIMARY HYPERTENSION: Primary | ICD-10-CM

## 2022-03-09 DIAGNOSIS — Z13.29 SCREENING FOR THYROID DISORDER: ICD-10-CM

## 2022-03-09 DIAGNOSIS — Z00.00 MEDICARE ANNUAL WELLNESS VISIT, SUBSEQUENT: ICD-10-CM

## 2022-03-09 PROCEDURE — G0439 PPPS, SUBSEQ VISIT: HCPCS | Performed by: FAMILY MEDICINE

## 2022-03-09 PROCEDURE — 99213 OFFICE O/P EST LOW 20 MIN: CPT | Performed by: FAMILY MEDICINE

## 2022-03-09 NOTE — PROGRESS NOTES
Chief Complaint   Patient presents with   [facility] Wellness Visit     Subsequent   Follow-up     Routine overdue  Health Maintenance   Topic Date Due    DTaP,Tdap,and Td Vaccines (1 - Tdap) 10/09/2000    Medicare Annual Wellness Visit (AWV)  04/12/2020    BMI: Followup Plan  04/12/2020    COVID-19 Vaccine (3 - Booster for Pfizer series) 08/25/2021    Influenza Vaccine (1) 09/01/2021    Depression Screening  08/13/2022    BMI: Adult  08/13/2022    Fall Risk  08/13/2022    Pneumococcal Vaccine: 65+ Years  Completed    HIB Vaccine  Aged Out    Hepatitis B Vaccine  Aged Out    IPV Vaccine  Aged Out    Hepatitis A Vaccine  Aged Out    Meningococcal ACWY Vaccine  Aged Out    HPV Vaccine  Aged Out      Assessment and Plan:     Problem List Items Addressed This Visit     None        BMI Counseling: Body mass index is 26 56 kg/m²  Follow-up plan was not completed due to elderly patient (72 years old) where weight reduction/weight gain would complicate underlying health condition such as: illness or physical disability  Depression Screening and Follow-up Plan: Patient was screened for depression during today's encounter  They screened negative with a PHQ-2 score of 0  Preventive health issues were discussed with patient, and age appropriate screening tests were ordered as noted in patient's After Visit Summary  Personalized health advice and appropriate referrals for health education or preventive services given if needed, as noted in patient's After Visit Summary       History of Present Illness:     Patient presents for Medicare Annual Wellness visit    Patient Care Team:  Michael Frausto MD as PCP - General     Problem List:     Patient Active Problem List   Diagnosis    Hypertension    Benign colon polyp    Hyperlipidemia    Impaired fasting glucose    Osteopenia    Snores    Taste absent    Vitamin D deficiency      Past Medical and Surgical History:     Past Medical History: Diagnosis Date    Anosmia     Cellulitis     Diverticulitis, colon     Fatigue 5/15/2017    Generalized osteoarthritis of multiple sites     Hyperlipidemia     Hypertension     Impacted cerumen of both ears     Lyme disease     Macular degeneration     Tremor      Past Surgical History:   Procedure Laterality Date    BREAST BIOPSY      CATARACT EXTRACTION      HYSTERECTOMY        Family History:     Family History   Problem Relation Age of Onset    Lung cancer Father       Social History:     Social History     Socioeconomic History    Marital status: /Civil Union     Spouse name: Not on file    Number of children: Not on file    Years of education: Not on file    Highest education level: Not on file   Occupational History    Not on file   Tobacco Use    Smoking status: Never Smoker    Smokeless tobacco: Never Used   Substance and Sexual Activity    Alcohol use: Yes     Alcohol/week: 5 0 standard drinks     Types: 5 Glasses of wine per week     Comment: social    Drug use: No    Sexual activity: Not on file   Other Topics Concern    Not on file   Social History Narrative    Not on file     Social Determinants of Health     Financial Resource Strain: Not on file   Food Insecurity: Not on file   Transportation Needs: Not on file   Physical Activity: Not on file   Stress: Not on file   Social Connections: Not on file   Intimate Partner Violence: Not on file   Housing Stability: Not on file      Medications and Allergies:     Current Outpatient Medications   Medication Sig Dispense Refill    bimatoprost (LUMIGAN) 0 01 % ophthalmic drops Administer 1 drop to the right eye daily at bedtime      methylPREDNISolone 4 MG tablet therapy pack Use as directed on package 21 each 0    Multiple Vitamins-Minerals (ICAPS MV PO) Take by mouth       No current facility-administered medications for this visit       No Known Allergies   Immunizations:     Immunization History   Administered Date(s) Administered    COVID-19 PFIZER VACCINE 0 3 ML IM 03/04/2021, 03/25/2021    INFLUENZA 09/14/2016, 09/25/2017    Influenza Split High Dose Preservative Free IM 09/14/2015, 09/14/2016, 09/25/2017    Influenza, high dose seasonal 0 7 mL 10/04/2018, 10/24/2019    Influenza, seasonal, injectable 10/03/2011, 12/20/2012, 10/18/2013    Pneumococcal Conjugate 13-Valent 09/14/2015    Pneumococcal Polysaccharide PPV23 01/01/2007, 03/20/2017    Td (adult), adsorbed 01/01/1990, 10/08/2000      Health Maintenance: There are no preventive care reminders to display for this patient  Topic Date Due    DTaP,Tdap,and Td Vaccines (1 - Tdap) 10/09/2000    COVID-19 Vaccine (3 - Booster for Pfizer series) 08/25/2021    Influenza Vaccine (1) 09/01/2021      Medicare Health Risk Assessment:     There were no vitals taken for this visit  June Gonsalves is here for her Subsequent Wellness visit  Health Risk Assessment:   Patient rates overall health as very good  Patient feels that their physical health rating is same  Patient is satisfied with their life  Eyesight was rated as slightly worse  Hearing was rated as slightly worse  Patient feels that their emotional and mental health rating is same  Patients states they are never, rarely angry  Patient states they are never, rarely unusually tired/fatigued  Pain experienced in the last 7 days has been some  Patient's pain rating has been 3/10  Patient states that she has experienced no weight loss or gain in last 6 months  Depression Screening:   PHQ-2 Score: 0      Fall Risk Screening: In the past year, patient has experienced: no history of falling in past year      Urinary Incontinence Screening:   Patient has not leaked urine accidently in the last six months  Home Safety:  Patient does not have trouble with stairs inside or outside of their home  Patient has no working smoke alarms and has no working carbon monoxide detector  Home safety hazards include: none  Nutrition:   Current diet is Regular  Medications:   Patient is not currently taking any over-the-counter supplements  Patient is able to manage medications  Activities of Daily Living (ADLs)/Instrumental Activities of Daily Living (IADLs):   Walk and transfer into and out of bed and chair?: Yes  Dress and groom yourself?: Yes    Bathe or shower yourself?: Yes    Feed yourself? Yes  Do your laundry/housekeeping?: Yes  Manage your money, pay your bills and track your expenses?: Yes  Make your own meals?: Yes    Do your own shopping?: Yes    Previous Hospitalizations:   Any hospitalizations or ED visits within the last 12 months?: No      Advance Care Planning:   Living will: Yes    Durable POA for healthcare: Yes    Advanced directive: Yes    End of Life Decisions reviewed with patient: Yes    Provider agrees with end of life decisions: Yes      Cognitive Screening:   Provider or family/friend/caregiver concerned regarding cognition?: No    PREVENTIVE SCREENINGS      Cardiovascular Screening:    General: History Lipid Disorder and Screening Not Indicated      Diabetes Screening:     General: Risks and Benefits Discussed    Due for: Blood Glucose      Colorectal Cancer Screening:     General: Screening Not Indicated      Breast Cancer Screening:     General: Screening Not Indicated      Cervical Cancer Screening:    General: Screening Not Indicated      Lung Cancer Screening:     General: Screening Not Indicated    Screening, Brief Intervention, and Referral to Treatment (SBIRT)    Screening  Typical number of drinks in a day: 1  Typical number of drinks in a week: 7  Interpretation: Low risk drinking behavior  AUDIT-C Screenin) How often did you have a drink containing alcohol in the past year? 4 or more times a week  2) How many drinks did you have on a typical day when you were drinking in the past year?  1 to 2  3) How often did you have 6 or more drinks on one occasion in the past year? less than monthly    AUDIT-C Score: 5  Interpretation: Score 3-12 (female): POSITIVE screen for alcohol misuse    Single Item Drug Screening:  How often have you used an illegal drug (including marijuana) or a prescription medication for non-medical reasons in the past year? never    Single Item Drug Screen Score: 0  Interpretation: Negative screen for possible drug use disorder      Maria Del Carmen Graham MD

## 2022-03-09 NOTE — PROGRESS NOTES
Assessment/Plan:    Hypertension  Diet control  DASH diet  Not on medication any more  Refused Flu shot  Got Covid19 shots  Recommend booster  POA--- and nephew  Living will---DNR  Diagnoses and all orders for this visit:    Primary hypertension  -     CBC and differential; Future  -     Comprehensive metabolic panel; Future  -     TSH, 3rd generation with Free T4 reflex; Future    Medicare annual wellness visit, subsequent  -     CBC and differential; Future  -     Comprehensive metabolic panel; Future  -     TSH, 3rd generation with Free T4 reflex; Future    Screening for thyroid disorder  -     CBC and differential; Future  -     Comprehensive metabolic panel; Future  -     TSH, 3rd generation with Free T4 reflex; Future          Subjective:      Patient ID: Carey Infante is a 80 y o  female  HPI    Pt is here with her   HTN----Pt states she feels fine  Denies headache, chest pain, SOB  Pt states she stopped lisinopril since 10/2019  She does not take any BP medication now  Glaucoma---FU ophthalmology  No smoking  1 glass of wine per day  Lives with   Does all ADL's  Does not drive  Denies recent falls  Denies depression  The following portions of the patient's history were reviewed and updated as appropriate: allergies, current medications, past family history, past medical history, past social history, past surgical history and problem list     Review of Systems   Constitutional: Negative for appetite change, chills and fever  HENT: Negative for congestion, ear pain, sinus pain and sore throat  Eyes: Negative for discharge and itching  Respiratory: Negative for apnea, cough, chest tightness, shortness of breath and wheezing  Cardiovascular: Negative for chest pain, palpitations and leg swelling  Gastrointestinal: Negative for abdominal pain, anal bleeding, constipation, diarrhea, nausea and vomiting     Endocrine: Negative for cold intolerance, heat intolerance and polyuria  Genitourinary: Negative for difficulty urinating and dysuria  Musculoskeletal: Negative for arthralgias, back pain and myalgias  Skin: Negative for rash  Neurological: Negative for dizziness and headaches  Psychiatric/Behavioral: Negative for agitation  Objective:      /78 (BP Location: Left arm, Patient Position: Sitting, Cuff Size: Adult)   Pulse 84   Temp 98 3 °F (36 8 °C) (Tympanic)   Resp 20   Ht 4' 10 25" (1 48 m)   Wt 58 2 kg (128 lb 3 2 oz)   SpO2 97%   BMI 26 56 kg/m²          Physical Exam  Constitutional:       General: She is not in acute distress  Appearance: She is well-developed  HENT:      Head: Normocephalic  Eyes:      General:         Right eye: No discharge  Left eye: No discharge  Conjunctiva/sclera: Conjunctivae normal    Neck:      Thyroid: No thyromegaly  Cardiovascular:      Rate and Rhythm: Normal rate and regular rhythm  Heart sounds: Normal heart sounds  No murmur heard  No friction rub  No gallop  Pulmonary:      Effort: Pulmonary effort is normal  No respiratory distress  Breath sounds: Normal breath sounds  No wheezing or rales  Chest:      Chest wall: No tenderness  Abdominal:      General: Bowel sounds are normal  There is no distension  Palpations: Abdomen is soft  There is no mass  Tenderness: There is no abdominal tenderness  There is no guarding or rebound  Musculoskeletal:         General: No tenderness or deformity  Normal range of motion  Cervical back: Normal range of motion  Lymphadenopathy:      Cervical: No cervical adenopathy  Neurological:      Mental Status: She is alert

## 2022-03-09 NOTE — PATIENT INSTRUCTIONS
Medicare Preventive Visit Patient Instructions  Thank you for completing your Welcome to Medicare Visit or Medicare Annual Wellness Visit today  Your next wellness visit will be due in one year (3/10/2023)  The screening/preventive services that you may require over the next 5-10 years are detailed below  Some tests may not apply to you based off risk factors and/or age  Screening tests ordered at today's visit but not completed yet may show as past due  Also, please note that scanned in results may not display below  Preventive Screenings:  Service Recommendations Previous Testing/Comments   Colorectal Cancer Screening  * Colonoscopy    * Fecal Occult Blood Test (FOBT)/Fecal Immunochemical Test (FIT)  * Fecal DNA/Cologuard Test  * Flexible Sigmoidoscopy Age: 54-65 years old   Colonoscopy: every 10 years (may be performed more frequently if at higher risk)  OR  FOBT/FIT: every 1 year  OR  Cologuard: every 3 years  OR  Sigmoidoscopy: every 5 years  Screening may be recommended earlier than age 48 if at higher risk for colorectal cancer  Also, an individualized decision between you and your healthcare provider will decide whether screening between the ages of 74-80 would be appropriate  Colonoscopy: Not on file  FOBT/FIT: Not on file  Cologuard: Not on file  Sigmoidoscopy: Not on file    Screening Not Indicated     Breast Cancer Screening Age: 36 years old  Frequency: every 1-2 years  Not required if history of left and right mastectomy Mammogram: Not on file        Cervical Cancer Screening Between the ages of 21-29, pap smear recommended once every 3 years  Between the ages of 33-67, can perform pap smear with HPV co-testing every 5 years     Recommendations may differ for women with a history of total hysterectomy, cervical cancer, or abnormal pap smears in past  Pap Smear: Not on file    Screening Not Indicated   Hepatitis C Screening Once for adults born between 1945 and 1965  More frequently in patients at high risk for Hepatitis C Hep C Antibody: Not on file        Diabetes Screening 1-2 times per year if you're at risk for diabetes or have pre-diabetes Fasting glucose: 80 mg/dL   A1C: 5 3 %        Cholesterol Screening Once every 5 years if you don't have a lipid disorder  May order more often based on risk factors  Lipid panel: Not on file    Screening Not Indicated  History Lipid Disorder     Other Preventive Screenings Covered by Medicare:  1  Abdominal Aortic Aneurysm (AAA) Screening: covered once if your at risk  You're considered to be at risk if you have a family history of AAA  2  Lung Cancer Screening: covers low dose CT scan once per year if you meet all of the following conditions: (1) Age 50-69; (2) No signs or symptoms of lung cancer; (3) Current smoker or have quit smoking within the last 15 years; (4) You have a tobacco smoking history of at least 30 pack years (packs per day multiplied by number of years you smoked); (5) You get a written order from a healthcare provider  3  Glaucoma Screening: covered annually if you're considered high risk: (1) You have diabetes OR (2) Family history of glaucoma OR (3)  aged 48 and older OR (3)  American aged 72 and older  3  Osteoporosis Screening: covered every 2 years if you meet one of the following conditions: (1) You're estrogen deficient and at risk for osteoporosis based off medical history and other findings; (2) Have a vertebral abnormality; (3) On glucocorticoid therapy for more than 3 months; (4) Have primary hyperparathyroidism; (5) On osteoporosis medications and need to assess response to drug therapy  · Last bone density test (DXA Scan): Not on file  5  HIV Screening: covered annually if you're between the age of 12-76  Also covered annually if you are younger than 13 and older than 72 with risk factors for HIV infection  For pregnant patients, it is covered up to 3 times per pregnancy      Immunizations:  Immunization Recommendations   Influenza Vaccine Annual influenza vaccination during flu season is recommended for all persons aged >= 6 months who do not have contraindications   Pneumococcal Vaccine (Prevnar and Pneumovax)  * Prevnar = PCV13  * Pneumovax = PPSV23   Adults 25-60 years old: 1-3 doses may be recommended based on certain risk factors  Adults 72 years old: Prevnar (PCV13) vaccine recommended followed by Pneumovax (PPSV23) vaccine  If already received PPSV23 since turning 65, then PCV13 recommended at least one year after PPSV23 dose  Hepatitis B Vaccine 3 dose series if at intermediate or high risk (ex: diabetes, end stage renal disease, liver disease)   Tetanus (Td) Vaccine - COST NOT COVERED BY MEDICARE PART B Following completion of primary series, a booster dose should be given every 10 years to maintain immunity against tetanus  Td may also be given as tetanus wound prophylaxis  Tdap Vaccine - COST NOT COVERED BY MEDICARE PART B Recommended at least once for all adults  For pregnant patients, recommended with each pregnancy  Shingles Vaccine (Shingrix) - COST NOT COVERED BY MEDICARE PART B  2 shot series recommended in those aged 48 and above     Health Maintenance Due:  There are no preventive care reminders to display for this patient  Immunizations Due:      Topic Date Due    DTaP,Tdap,and Td Vaccines (1 - Tdap) 10/09/2000    COVID-19 Vaccine (3 - Booster for Pfizer series) 08/25/2021    Influenza Vaccine (1) 09/01/2021     Advance Directives   What are advance directives? Advance directives are legal documents that state your wishes and plans for medical care  These plans are made ahead of time in case you lose your ability to make decisions for yourself  Advance directives can apply to any medical decision, such as the treatments you want, and if you want to donate organs  What are the types of advance directives?   There are many types of advance directives, and each state has rules about how to use them  You may choose a combination of any of the following:  · Living will: This is a written record of the treatment you want  You can also choose which treatments you do not want, which to limit, and which to stop at a certain time  This includes surgery, medicine, IV fluid, and tube feedings  · Durable power of  for healthcare [facility]): This is a written record that states who you want to make healthcare choices for you when you are unable to make them for yourself  This person, called a proxy, is usually a family member or a friend  You may choose more than 1 proxy  · Do not resuscitate (DNR) order:  A DNR order is used in case your heart stops beating or you stop breathing  It is a request not to have certain forms of treatment, such as CPR  A DNR order may be included in other types of advance directives  · Medical directive: This covers the care that you want if you are in a coma, near death, or unable to make decisions for yourself  You can list the treatments you want for each condition  Treatment may include pain medicine, surgery, blood transfusions, dialysis, IV or tube feedings, and a ventilator (breathing machine)  · Values history: This document has questions about your views, beliefs, and how you feel and think about life  This information can help others choose the care that you would choose  Why are advance directives important? An advance directive helps you control your care  Although spoken wishes may be used, it is better to have your wishes written down  Spoken wishes can be misunderstood, or not followed  Treatments may be given even if you do not want them  An advance directive may make it easier for your family to make difficult choices about your care     Weight Management   Why it is important to manage your weight:  Being overweight increases your risk of health conditions such as heart disease, high blood pressure, type 2 diabetes, and certain types of cancer  It can also increase your risk for osteoarthritis, sleep apnea, and other respiratory problems  Aim for a slow, steady weight loss  Even a small amount of weight loss can lower your risk of health problems  How to lose weight safely:  A safe and healthy way to lose weight is to eat fewer calories and get regular exercise  You can lose up about 1 pound a week by decreasing the number of calories you eat by 500 calories each day  Healthy meal plan for weight management:  A healthy meal plan includes a variety of foods, contains fewer calories, and helps you stay healthy  A healthy meal plan includes the following:  · Eat whole-grain foods more often  A healthy meal plan should contain fiber  Fiber is the part of grains, fruits, and vegetables that is not broken down by your body  Whole-grain foods are healthy and provide extra fiber in your diet  Some examples of whole-grain foods are whole-wheat breads and pastas, oatmeal, brown rice, and bulgur  · Eat a variety of vegetables every day  Include dark, leafy greens such as spinach, kale, tremayne greens, and mustard greens  Eat yellow and orange vegetables such as carrots, sweet potatoes, and winter squash  · Eat a variety of fruits every day  Choose fresh or canned fruit (canned in its own juice or light syrup) instead of juice  Fruit juice has very little or no fiber  · Eat low-fat dairy foods  Drink fat-free (skim) milk or 1% milk  Eat fat-free yogurt and low-fat cottage cheese  Try low-fat cheeses such as mozzarella and other reduced-fat cheeses  · Choose meat and other protein foods that are low in fat  Choose beans or other legumes such as split peas or lentils  Choose fish, skinless poultry (chicken or turkey), or lean cuts of red meat (beef or pork)  Before you cook meat or poultry, cut off any visible fat  · Use less fat and oil  Try baking foods instead of frying them   Add less fat, such as margarine, sour cream, regular salad dressing and mayonnaise to foods  Eat fewer high-fat foods  Some examples of high-fat foods include french fries, doughnuts, ice cream, and cakes  · Eat fewer sweets  Limit foods and drinks that are high in sugar  This includes candy, cookies, regular soda, and sweetened drinks  Exercise:  Exercise at least 30 minutes per day on most days of the week  Some examples of exercise include walking, biking, dancing, and swimming  You can also fit in more physical activity by taking the stairs instead of the elevator or parking farther away from stores  Ask your healthcare provider about the best exercise plan for you  Alcohol Use and Your Health    Drinking too much can harm your health  Excessive alcohol use leads to about 88,000 death in the [place] each year, and shortens the life of those who diet by almost 30 years  Further, excessive drinking cost the economy $249 billion in 2010  Most excessive drinkers are not alcohol dependent  Excessive alcohol use has immediate effects that increase the risk of many harmful health conditions  These are most often the result of binge drinking  Over time, excessive alcohol use can lead to the development of chronic diseases and other series health problems  What is considered a "drink"? Excessive alcohol use includes:  · Binge Drinking: For women, 4 or more drinks consumed on one occasion  For men, 5 or more drinks consumed on one occasion  · Heavy Drinking: For women, 8 or more drinks per week  For men, 15 or more drinks per week  · Any alcohol used by pregnant women  · Any alcohol used by those under the age of 21 years    If you choose to drink, do so in moderation:  · Do not drink at all if you are under the age of 24, or if you are or may be pregnant, or have health problems that could be made worse by drinking    · For women, up to 1 drink per day  · For men, up to 2 drinks a day    No one should begin drinking or drink more frequently based on potential health benefits    Short-Term Health Risks:  · Injuries: motor vehicle crashes, falls, drownings, burns  · Violence: homicide, suicide, sexual assault, intimate partner violence  · Alcohol poisoning  · Reproductive health: risky sexual behaviors, unintended prengnacy, sexually transmitted diseases, miscarriage, stillbirth, fetal alcohol syndrome    Long-Term Health Risks:  · Chronic diseases: high blood pressure, heart disease, stroke, liver disease, digestive problems  · Cancers: breast, mouth and throat, liver, colon  · Learning and memory problems: dementia, poor school performance  · Mental health: depression, anxiety, insomnia  · Social problems: lost productivity, family problems, unemployment  · Alcohol dependence    For support and more information:  · Substance Abuse and [facility] [address] [place]  Web Address: https://[employer]/    · Alcoholics Anonymous        Web Address: http://www koch info/    https://www cdc gov/alcohol/fact-sheets/alcohol-use htm     © Copyright [employer] 2018 Information is for End User's use only and may not be sold, redistributed or otherwise used for commercial purposes   All illustrations and images included in CareNotes® are the copyrighted property of [employer]  or [address] [place], [place] [address]pe

## 2022-12-01 ENCOUNTER — APPOINTMENT (EMERGENCY)
Dept: RADIOLOGY | Facility: HOSPITAL | Age: 87
End: 2022-12-01

## 2022-12-01 ENCOUNTER — HOSPITAL ENCOUNTER (INPATIENT)
Facility: HOSPITAL | Age: 87
LOS: 14 days | Discharge: NURSING, REHAB OR LONG-TERM CARE | End: 2022-12-15
Attending: OBSTETRICS & GYNECOLOGY | Admitting: SURGERY

## 2022-12-01 DIAGNOSIS — N28.89 RENAL MASS, RIGHT: ICD-10-CM

## 2022-12-01 DIAGNOSIS — F03.B0 MODERATE DEMENTIA WITHOUT BEHAVIORAL DISTURBANCE, PSYCHOTIC DISTURBANCE, MOOD DISTURBANCE, OR ANXIETY, UNSPECIFIED DEMENTIA TYPE: ICD-10-CM

## 2022-12-01 DIAGNOSIS — U07.1 COVID: ICD-10-CM

## 2022-12-01 DIAGNOSIS — E87.0 HYPERNATREMIA: ICD-10-CM

## 2022-12-01 DIAGNOSIS — R63.8 DIFFICULTY EATING: ICD-10-CM

## 2022-12-01 DIAGNOSIS — S22.39XA RIB FRACTURE: Primary | ICD-10-CM

## 2022-12-01 DIAGNOSIS — I10 PRIMARY HYPERTENSION: ICD-10-CM

## 2022-12-01 DIAGNOSIS — R05.8 SPUTUM PRODUCTION: ICD-10-CM

## 2022-12-01 PROBLEM — S22.42XA FRACTURE OF MULTIPLE RIBS OF LEFT SIDE: Status: ACTIVE | Noted: 2022-12-01

## 2022-12-01 LAB
4HR DELTA HS TROPONIN: 18 NG/L
ABO GROUP BLD: NORMAL
ALBUMIN SERPL BCP-MCNC: 3.3 G/DL (ref 3.5–5)
ALP SERPL-CCNC: 81 U/L (ref 46–116)
ALT SERPL W P-5'-P-CCNC: 33 U/L (ref 12–78)
AMORPH URATE CRY URNS QL MICRO: ABNORMAL
ANION GAP SERPL CALCULATED.3IONS-SCNC: 9 MMOL/L (ref 4–13)
APAP SERPL-MCNC: <2 UG/ML (ref 10–20)
APTT PPP: 26 SECONDS (ref 23–37)
AST SERPL W P-5'-P-CCNC: 53 U/L (ref 5–45)
BACTERIA UR QL AUTO: ABNORMAL /HPF
BASE EXCESS BLDA CALC-SCNC: 0 MMOL/L (ref -2–3)
BASOPHILS # BLD AUTO: 0.01 THOUSANDS/ÂΜL (ref 0–0.1)
BASOPHILS NFR BLD AUTO: 0 % (ref 0–1)
BILIRUB SERPL-MCNC: 1.43 MG/DL (ref 0.2–1)
BILIRUB UR QL STRIP: NEGATIVE
BUN SERPL-MCNC: 16 MG/DL (ref 5–25)
CA-I BLD-SCNC: 1.12 MMOL/L (ref 1.12–1.32)
CALCIUM ALBUM COR SERPL-MCNC: 9.6 MG/DL (ref 8.3–10.1)
CALCIUM SERPL-MCNC: 9 MG/DL (ref 8.3–10.1)
CARDIAC TROPONIN I PNL SERPL HS: 22 NG/L
CARDIAC TROPONIN I PNL SERPL HS: 40 NG/L
CHLORIDE SERPL-SCNC: 108 MMOL/L (ref 96–108)
CK MB SERPL-MCNC: 4.9 NG/ML (ref 0–5)
CK MB SERPL-MCNC: <1 % (ref 0–2.5)
CK SERPL-CCNC: 870 U/L (ref 26–192)
CLARITY UR: ABNORMAL
CO2 SERPL-SCNC: 23 MMOL/L (ref 21–32)
COLOR UR: YELLOW
CREAT SERPL-MCNC: 0.87 MG/DL (ref 0.6–1.3)
EOSINOPHIL # BLD AUTO: 0 THOUSAND/ÂΜL (ref 0–0.61)
EOSINOPHIL NFR BLD AUTO: 0 % (ref 0–6)
ERYTHROCYTE [DISTWIDTH] IN BLOOD BY AUTOMATED COUNT: 12.6 % (ref 11.6–15.1)
ETHANOL SERPL-MCNC: <3 MG/DL (ref 0–3)
GFR SERPL CREATININE-BSD FRML MDRD: 56 ML/MIN/1.73SQ M
GLUCOSE SERPL-MCNC: 106 MG/DL (ref 65–140)
GLUCOSE SERPL-MCNC: 108 MG/DL (ref 65–140)
GLUCOSE UR STRIP-MCNC: NEGATIVE MG/DL
GRAN CASTS #/AREA URNS LPF: ABNORMAL /[LPF]
HCO3 BLDA-SCNC: 23.9 MMOL/L (ref 24–30)
HCT VFR BLD AUTO: 44.5 % (ref 34.8–46.1)
HCT VFR BLD CALC: 42 % (ref 34.8–46.1)
HGB BLD-MCNC: 15.1 G/DL (ref 11.5–15.4)
HGB BLDA-MCNC: 14.3 G/DL (ref 11.5–15.4)
HGB UR QL STRIP.AUTO: ABNORMAL
IMM GRANULOCYTES # BLD AUTO: 0.04 THOUSAND/UL (ref 0–0.2)
IMM GRANULOCYTES NFR BLD AUTO: 1 % (ref 0–2)
INR PPP: 0.88 (ref 0.84–1.19)
KETONES UR STRIP-MCNC: ABNORMAL MG/DL
LEUKOCYTE ESTERASE UR QL STRIP: NEGATIVE
LYMPHOCYTES # BLD AUTO: 0.4 THOUSANDS/ÂΜL (ref 0.6–4.47)
LYMPHOCYTES NFR BLD AUTO: 7 % (ref 14–44)
MCH RBC QN AUTO: 33 PG (ref 26.8–34.3)
MCHC RBC AUTO-ENTMCNC: 33.9 G/DL (ref 31.4–37.4)
MCV RBC AUTO: 97 FL (ref 82–98)
MONOCYTES # BLD AUTO: 0.62 THOUSAND/ÂΜL (ref 0.17–1.22)
MONOCYTES NFR BLD AUTO: 11 % (ref 4–12)
MUCOUS THREADS UR QL AUTO: ABNORMAL
NEUTROPHILS # BLD AUTO: 4.49 THOUSANDS/ÂΜL (ref 1.85–7.62)
NEUTS SEG NFR BLD AUTO: 81 % (ref 43–75)
NITRITE UR QL STRIP: NEGATIVE
NON-SQ EPI CELLS URNS QL MICRO: ABNORMAL /HPF
NRBC BLD AUTO-RTO: 0 /100 WBCS
PCO2 BLD: 25 MMOL/L (ref 21–32)
PCO2 BLD: 35.1 MM HG (ref 42–50)
PH BLD: 7.44 [PH] (ref 7.3–7.4)
PH UR STRIP.AUTO: 5.5 [PH]
PLATELET # BLD AUTO: 227 THOUSANDS/UL (ref 149–390)
PMV BLD AUTO: 8.7 FL (ref 8.9–12.7)
PO2 BLD: 30 MM HG (ref 35–45)
POTASSIUM BLD-SCNC: 4.2 MMOL/L (ref 3.5–5.3)
POTASSIUM SERPL-SCNC: 3.5 MMOL/L (ref 3.5–5.3)
PROT SERPL-MCNC: 6.2 G/DL (ref 6.4–8.4)
PROT UR STRIP-MCNC: ABNORMAL MG/DL
PROTHROMBIN TIME: 12.1 SECONDS (ref 11.6–14.5)
RBC # BLD AUTO: 4.57 MILLION/UL (ref 3.81–5.12)
RBC #/AREA URNS AUTO: ABNORMAL /HPF
RH BLD: POSITIVE
SALICYLATES SERPL-MCNC: <3 MG/DL (ref 3–20)
SAO2 % BLD FROM PO2: 61 % (ref 60–85)
SODIUM BLD-SCNC: 138 MMOL/L (ref 136–145)
SODIUM SERPL-SCNC: 140 MMOL/L (ref 135–147)
SP GR UR STRIP.AUTO: 1.02 (ref 1–1.03)
SPECIMEN SOURCE: ABNORMAL
UROBILINOGEN UR STRIP-ACNC: <2 MG/DL
WBC # BLD AUTO: 5.56 THOUSAND/UL (ref 4.31–10.16)
WBC #/AREA URNS AUTO: ABNORMAL /HPF

## 2022-12-01 RX ORDER — SODIUM CHLORIDE, SODIUM GLUCONATE, SODIUM ACETATE, POTASSIUM CHLORIDE, MAGNESIUM CHLORIDE, SODIUM PHOSPHATE, DIBASIC, AND POTASSIUM PHOSPHATE .53; .5; .37; .037; .03; .012; .00082 G/100ML; G/100ML; G/100ML; G/100ML; G/100ML; G/100ML; G/100ML
75 INJECTION, SOLUTION INTRAVENOUS CONTINUOUS
Status: DISCONTINUED | OUTPATIENT
Start: 2022-12-01 | End: 2022-12-03

## 2022-12-01 RX ORDER — ENOXAPARIN SODIUM 100 MG/ML
40 INJECTION SUBCUTANEOUS DAILY
Status: DISCONTINUED | OUTPATIENT
Start: 2022-12-01 | End: 2022-12-01 | Stop reason: SDUPTHER

## 2022-12-01 RX ORDER — ENOXAPARIN SODIUM 100 MG/ML
40 INJECTION SUBCUTANEOUS DAILY
Status: DISCONTINUED | OUTPATIENT
Start: 2022-12-01 | End: 2022-12-01

## 2022-12-01 RX ORDER — ENOXAPARIN SODIUM 100 MG/ML
30 INJECTION SUBCUTANEOUS EVERY 24 HOURS
Status: DISCONTINUED | OUTPATIENT
Start: 2022-12-01 | End: 2022-12-02

## 2022-12-01 RX ORDER — ACETAMINOPHEN 325 MG/1
650 TABLET ORAL EVERY 6 HOURS PRN
Status: DISCONTINUED | OUTPATIENT
Start: 2022-12-01 | End: 2022-12-01

## 2022-12-01 RX ORDER — OLANZAPINE 5 MG/1
5 TABLET, ORALLY DISINTEGRATING ORAL
Status: DISCONTINUED | OUTPATIENT
Start: 2022-12-01 | End: 2022-12-15 | Stop reason: HOSPADM

## 2022-12-01 RX ORDER — OXYCODONE HYDROCHLORIDE 5 MG/1
2.5 TABLET ORAL EVERY 4 HOURS PRN
Status: DISCONTINUED | OUTPATIENT
Start: 2022-12-01 | End: 2022-12-15 | Stop reason: HOSPADM

## 2022-12-01 RX ORDER — LIDOCAINE 50 MG/G
1 PATCH TOPICAL DAILY
Status: DISCONTINUED | OUTPATIENT
Start: 2022-12-01 | End: 2022-12-15 | Stop reason: HOSPADM

## 2022-12-01 RX ORDER — ACETAMINOPHEN 325 MG/1
975 TABLET ORAL EVERY 8 HOURS SCHEDULED
Status: DISCONTINUED | OUTPATIENT
Start: 2022-12-01 | End: 2022-12-03

## 2022-12-01 RX ORDER — LABETALOL HYDROCHLORIDE 5 MG/ML
10 INJECTION, SOLUTION INTRAVENOUS ONCE
Status: COMPLETED | OUTPATIENT
Start: 2022-12-01 | End: 2022-12-01

## 2022-12-01 RX ORDER — HYDRALAZINE HYDROCHLORIDE 20 MG/ML
5 INJECTION INTRAMUSCULAR; INTRAVENOUS EVERY 6 HOURS PRN
Status: DISCONTINUED | OUTPATIENT
Start: 2022-12-01 | End: 2022-12-15 | Stop reason: HOSPADM

## 2022-12-01 RX ORDER — LANOLIN ALCOHOL/MO/W.PET/CERES
3 CREAM (GRAM) TOPICAL ONCE
Status: COMPLETED | OUTPATIENT
Start: 2022-12-01 | End: 2022-12-01

## 2022-12-01 RX ADMIN — SODIUM CHLORIDE, SODIUM GLUCONATE, SODIUM ACETATE, POTASSIUM CHLORIDE, MAGNESIUM CHLORIDE, SODIUM PHOSPHATE, DIBASIC, AND POTASSIUM PHOSPHATE 75 ML/HR: .53; .5; .37; .037; .03; .012; .00082 INJECTION, SOLUTION INTRAVENOUS at 16:00

## 2022-12-01 RX ADMIN — IOHEXOL 100 ML: 350 INJECTION, SOLUTION INTRAVENOUS at 12:19

## 2022-12-01 RX ADMIN — OLANZAPINE 5 MG: 5 TABLET, ORALLY DISINTEGRATING ORAL at 23:56

## 2022-12-01 RX ADMIN — Medication 3 MG: at 22:07

## 2022-12-01 RX ADMIN — TETANUS TOXOID, REDUCED DIPHTHERIA TOXOID AND ACELLULAR PERTUSSIS VACCINE, ADSORBED 0.5 ML: 5; 2.5; 8; 8; 2.5 SUSPENSION INTRAMUSCULAR at 14:16

## 2022-12-01 RX ADMIN — ACETAMINOPHEN 975 MG: 325 TABLET ORAL at 16:01

## 2022-12-01 RX ADMIN — LIDOCAINE 5% 1 PATCH: 700 PATCH TOPICAL at 14:15

## 2022-12-01 RX ADMIN — ACETAMINOPHEN 975 MG: 325 TABLET ORAL at 22:07

## 2022-12-01 RX ADMIN — ENOXAPARIN SODIUM 30 MG: 30 INJECTION SUBCUTANEOUS at 18:33

## 2022-12-01 RX ADMIN — BIMATOPROST 1 DROP: 0.1 SOLUTION/ DROPS OPHTHALMIC at 22:07

## 2022-12-01 RX ADMIN — LABETALOL HYDROCHLORIDE 10 MG: 5 INJECTION, SOLUTION INTRAVENOUS at 14:15

## 2022-12-01 NOTE — PLAN OF CARE
Problem: Prexisting or High Potential for Compromised Skin Integrity  Goal: Skin integrity is maintained or improved  Description: INTERVENTIONS:  - Identify patients at risk for skin breakdown  - Assess and monitor skin integrity  - Assess and monitor nutrition and hydration status  - Monitor labs   - Assess for incontinence   - Turn and reposition patient  - Assist with mobility/ambulation  - Relieve pressure over bony prominences  - Avoid friction and shearing  - Provide appropriate hygiene as needed including keeping skin clean and dry  - Evaluate need for skin moisturizer/barrier cream  - Collaborate with interdisciplinary team   - Patient/family teaching  - Consider wound care consult   Outcome: Progressing     Problem: Potential for Falls  Goal: Patient will remain free of falls  Description: INTERVENTIONS:  - Educate patient/family on patient safety including physical limitations  - Instruct patient to call for assistance with activity   - Consult OT/PT to assist with strengthening/mobility   - Keep Call bell within reach  - Keep bed low and locked with side rails adjusted as appropriate  - Keep care items and personal belongings within reach  - Initiate and maintain comfort rounds  - Make Fall Risk Sign visible to staff  - Offer Toileting every 2 Hours, in advance of need  - Initiate/Maintain bed alarm  - Obtain necessary fall risk management equipment:   - Apply yellow socks and bracelet for high fall risk patients  - Consider moving patient to room near nurses station  Outcome: Progressing     Problem: MOBILITY - ADULT  Goal: Maintain or return to baseline ADL function  Description: INTERVENTIONS:  -  Assess patient's ability to carry out ADLs; assess patient's baseline for ADL function and identify physical deficits which impact ability to perform ADLs (bathing, care of mouth/teeth, toileting, grooming, dressing, etc )  - Assess/evaluate cause of self-care deficits   - Assess range of motion  - Assess patient's mobility; develop plan if impaired  - Assess patient's need for assistive devices and provide as appropriate  - Encourage maximum independence but intervene and supervise when necessary  - Involve family in performance of ADLs  - Assess for home care needs following discharge   - Consider OT consult to assist with ADL evaluation and planning for discharge  - Provide patient education as appropriate  Outcome: Progressing  Goal: Maintains/Returns to pre admission functional level  Description: INTERVENTIONS:  - Perform BMAT or MOVE assessment daily    - Set and communicate daily mobility goal to care team and patient/family/caregiver  - Collaborate with rehabilitation services on mobility goals if consulted  - Perform Range of Motion 4 times a day  - Reposition patient every 2 hours    - Dangle patient 3 times a day  - Stand patient 3 times a day  - Out of bed for meals 3 times a day  - Out of bed for toileting  - Record patient progress and toleration of activity level   Outcome: Progressing     Problem: PAIN - ADULT  Goal: Verbalizes/displays adequate comfort level or baseline comfort level  Description: Interventions:  - Encourage patient to monitor pain and request assistance  - Assess pain using appropriate pain scale  - Administer analgesics based on type and severity of pain and evaluate response  - Implement non-pharmacological measures as appropriate and evaluate response  - Consider cultural and social influences on pain and pain management  - Notify physician/advanced practitioner if interventions unsuccessful or patient reports new pain  Outcome: Progressing     Problem: INFECTION - ADULT  Goal: Absence or prevention of progression during hospitalization  Description: INTERVENTIONS:  - Assess and monitor for signs and symptoms of infection  - Monitor lab/diagnostic results  - Monitor all insertion sites, i e  indwelling lines, tubes, and drains  - Monitor endotracheal if appropriate and nasal secretions for changes in amount and color  - [place] appropriate cooling/warming therapies per order  - Administer medications as ordered  - Instruct and encourage patient and family to use good hand hygiene technique  - Identify and instruct in appropriate isolation precautions for identified infection/condition  Outcome: Progressing     Problem: SAFETY ADULT  Goal: Maintains/Returns to pre admission functional level  Description: INTERVENTIONS:  - Perform BMAT or MOVE assessment daily    - Set and communicate daily mobility goal to care team and patient/family/caregiver  - Collaborate with rehabilitation services on mobility goals if consulted  - Perform Range of Motion 4 times a day  - Reposition patient every 2 hours  - Out of bed to chair 3 times a day   - Out of bed for meals 3 times a day  - Out of bed for toileting  - Record patient progress and toleration of activity level   Outcome: Progressing     Problem: DISCHARGE PLANNING  Goal: Discharge to home or other facility with appropriate resources  Description: INTERVENTIONS:  - Identify barriers to discharge w/patient and caregiver  - Arrange for needed discharge resources and transportation as appropriate  - Identify discharge learning needs (meds, wound care, etc )  - Arrange for interpretive services to assist at discharge as needed  - Refer to Case Management Department for coordinating discharge planning if the patient needs post-hospital services based on physician/advanced practitioner order or complex needs related to functional status, cognitive ability, or social support system  Outcome: Progressing     Problem: Knowledge Deficit  Goal: Patient/family/caregiver demonstrates understanding of disease process, treatment plan, medications, and discharge instructions  Description: Complete learning assessment and assess knowledge base    Interventions:  - Provide teaching at level of understanding  - Provide teaching via preferred learning methods  Outcome: Progressing     Problem: Nutrition/Hydration-ADULT  Goal: Nutrient/Hydration intake appropriate for improving, restoring or maintaining nutritional needs  Description: Monitor and assess patient's nutrition/hydration status for malnutrition  Collaborate with interdisciplinary team and initiate plan and interventions as ordered  Monitor patient's weight and dietary intake as ordered or per policy  Utilize nutrition screening tool and intervene as necessary  Determine patient's food preferences and provide high-protein, high-caloric foods as appropriate       INTERVENTIONS:  - Monitor oral intake, urinary output, labs, and treatment plans  - Assess nutrition and hydration status and recommend course of action  - Evaluate amount of meals eaten  - Assist patient with eating if necessary   - Allow adequate time for meals  - Recommend/ encourage appropriate diets, oral nutritional supplements, and vitamin/mineral supplements  - Order, calculate, and assess calorie counts as needed  - Recommend, monitor, and adjust tube feedings and TPN/PPN based on assessed needs  - Assess need for intravenous fluids  - Provide specific nutrition/hydration education as appropriate  - Include patient/family/caregiver in decisions related to nutrition  Outcome: Progressing

## 2022-12-01 NOTE — RESPIRATORY THERAPY NOTE
RT Protocol Note  Louisa Lee 80 y o  female MRN: [id number]  Unit/Bed#: ICU 08 Encounter: [id number]    Assessment    Active Problems:    * No active hospital problems  *      Home Pulmonary Medications:         Past Medical History:   Diagnosis Date    Anosmia     Cellulitis     Diverticulitis, colon     Fatigue 5/15/2017    Generalized osteoarthritis of multiple sites     Hyperlipidemia     Hypertension     Impacted cerumen of both ears     Impaired fasting glucose 7/4/2012    Lyme disease     Macular degeneration     Tremor      Social History     Socioeconomic History    Marital status: /Civil Union     Spouse name: None    Number of children: None    Years of education: None    Highest education level: None   Occupational History    None   Tobacco Use    Smoking status: Never    Smokeless tobacco: Never   Vaping Use    Vaping Use: Never used   Substance and Sexual Activity    Alcohol use: Yes     Alcohol/week: 5 0 standard drinks     Types: 5 Glasses of wine per week     Comment: social    Drug use: No    Sexual activity: None   Other Topics Concern    None   Social History Narrative    None     Social Determinants of Health     Financial Resource Strain: Not on file   Food Insecurity: Not on file   Transportation Needs: Not on file   Physical Activity: Not on file   Stress: Not on file   Social Connections: Not on file   Intimate Partner Violence: Not on file   Housing Stability: Not on file       Subjective         Objective    Physical Exam:   Assessment Type: Assess only  General Appearance: Alert  Respiratory Pattern: Normal  Chest Assessment: Chest expansion symmetrical  Bilateral Breath Sounds: Diminished    Vitals:  Blood pressure 155/75, pulse 82, temperature 97 8 °F (36 6 °C), temperature source Oral, resp  rate 18, height 4' 10" (1 473 m), weight 56 6 kg (124 lb 12 5 oz), SpO2 97 %, not currently breastfeeding            Imaging and other studies: I have personally reviewed pertinent reports  Plan       Airway Clearance Plan: Incentive Spirometer, Flutter     Resp Comments: (P) Pt admitted s/p fall  BS decreased  Pt has fx ribs  Pt instructed on Flutter valve and IS  Pt has some difficulty following directions  No home pulmonary meds  Will continue to monitor pt

## 2022-12-01 NOTE — CONSULTS
Consultation - Geriatric Medicine   Yudy Narciso 80 y o  female MRN: [id number]  Unit/Bed#: ICU 08 Encounter: [id number]      Assessment/Plan     Dementia without behavorial disturbance   -moderate and progressive, likely large vascular component   -at baseline very forgetful and requires assist with ADLs and IADLs  -MoCA 27/30 (2018) with marked decline in cognition since that time   -[address] on admission personally reviewed and reveals at least moderate to severe chronic microangiopathic changes  -no recent TSH or B12, will check with morning labs  -underlying cognitive impairment increases risk developing delirium during hospitalization, strict delirium precautions encouraged  -due to increasing level of care needs pt unlikely to be able to return to community setting on hosp dc and will likely benefit from higher level of care, discussed with pts emergency contact, Cachorro Johnson who is her and husbands primary support and he is in agreement with same   -PT/OT pending, appreciate CM assist with d/c planning     Ambulatory dysfunction with fall  -recurrent unwitnessed falls at home  -(suspected due to facial injuries) head strike (unknown) loss of consciousness  -injuries as outlined below  -Does not require use of assist device for ambulation at baseline  -hx recurrent falls with at least three in past week  -remains high risk future falls due to age, hx fall, poor safety awareness, impulsivity, deconditioning/debility and unfamiliar environment   -encourage good body mechanics and assist with all transfers  -keep personal items and call bell close to prevent reaching  -maintain environment free of fall hazards  -encourage appropriate footwear and adequate lighting at all times when out of bed  -PT and OT pending    Left-sided rib fractures (3-7)  -s/p unwitnessed fall home  -CT chest abdomen pelvis on admission reports fractures of left-sided ribs 3 7  -currently saturating well on room air  -encourage aggressive pulmonary toilet and ISS  -continue multimodal acute pain control per geriatric pain protocol  -followup chest x-ray pending    Acute pain due to trauma   -recommend pain control per Geriatric pain protocol:  Tylenol 975mg Q8H scheduled  Roxicodone 2 5mg Q4H PRN moderate pain  Roxicodone 5mg Q4H PRN severe pain  Dilaudid 0 2mg Q4H PRN  -consider adjuncts such as lidocaine patch topically  -encourage addition of non-pharmacologic pain treatment including ice and frequent repositioning  -recommend  bowel regimen to prevent and treat constipation due to increased risk with acute pain and opiate pain medications    Hypertensive urgency  -/98  -likely multifactorial including largely pain and stress driven due to traumatic injuries   -continue to optimize hemodynamics    Delirium precautions  -Patient is high risk of delirium due to age, fall, traumatic injury, acute pain, hospitalization  -Initiate delirium precautions  -maintain normal sleep/wake cycle  -minimize overnight interruptions, group overnight vitals/labs/nursing checks as possible  -dim lights, close blinds and turn off tv to minimize stimulation and encourage sleep environment in evenings  -ensure that pain is well controlled  -monitor for fecal and urinary retention which may precipitate delirium  -encourage early mobilization and ambulation with assistance once cleared to safely do so  -provide frequent reorientation and redirection as indicated appropriate  -encourage family and friends at the bedside to help help calm patient if anxious  -avoid medications which may precipitate or worsen delirium such as tramadol, benzodiazepine, anticholinergics, and benadryl  -encourage hydration and nutrition   -redirect unwanted behaviors as first line    Deconditioning/debility/frailty  -clinical frailty scale stage 6, moderately frail  -multifactorial including age, ambulatory dysfunction recurrent falls, diffuse subcutaneous fat muscle wasting and progressive cognitive impairment  -albumin low at 3 3, encourage well-balanced nutrition, consider nutritional supplements between meals if oral intake is poor  -optimize conditions and address acute metabolic derangements as arise  -monitor for and treat any anxiety/mood/depression symptoms as may impact patient's response to therapies well overall sense of well-being and quality of life  -continue psychosocial supports of patient and family  -insert treatments interventions along with patient's and family's wishes and goals of care    Home medication review     Patient does not take any daily medications as confirmed with PCP records and patients nephew who assists with cares  Care coordination: rounded with Miriam Lee (RN) in ED and Will (CM)    History of Present Illness   Physician Requesting Consult: Maile Scott DO  Reason for Consult / Principal Problem: Fall   Hx and PE limited by: Acute metabolic encephalopathic   Additional history obtained from: Chart review and patient evaluation, spoke with pts nephew, Heather Ac [phone] for additional background as  at bedside unable to provide reliable information     HPI: Bibiana Dia is a 80y o  year old female with HTN, HLD, Vit D deficiency, tremor and macular degeneration who is admitted to the trauma service with ambulatory dysfunction and fall found have multiple left-sided rib fractures on admission imaging, she is being seen consultation by Geriatrics for acute metabolic encephalopathy  Sam Rivera is seen and examined at bedside in ED where she is lying resting, she is oriented to self and unable to provide any reliable information regarding events leading to admission, her  at bedside is also unable to provide any reliable history either and thus it was obtained from her nephew by phone   He explains that Sam Rivera and her  reside at home alone, he has noted they are starting to have some difficulties caring for themselves with increasing age and declining mobility  Raúl Galan has been having falls recently with at least two in the past week as well as the one leading to current admission but is uncertain as to has occurred as they were unwitnessed at home and he learned of the falls from her  who was home at the time  She has underlying cognitive impairment with some accelerated decline recently noted by family  She does not take any medications that family is aware of, she does not use assist device for ambulation, does not use glasses or dentures  She has two sets of outdoor stairs to get into the home and the restroom is not on the same level of the other living quarters  Her nephew feels that the patient and her  are nearing the point that they are no longer able to safely reside independently in the community and are needing higher level of care       Inpatient consult to Gerontology  Consult performed by: Annabelle Lousi DO  Consult ordered by: Demetra Mohan DO        Review of Systems   Unable to perform ROS: Dementia     Historical Information   Past Medical History:   Diagnosis Date   • Anosmia    • Cellulitis    • Diverticulitis, colon    • Fatigue 5/15/2017   • Generalized osteoarthritis of multiple sites    • Hyperlipidemia    • Hypertension    • Impacted cerumen of both ears    • Impaired fasting glucose 7/4/2012   • Lyme disease    • Macular degeneration    • Tremor      Past Surgical History:   Procedure Laterality Date   • BREAST BIOPSY     • CATARACT EXTRACTION     • HYSTERECTOMY       Social History   Social History     Substance and Sexual Activity   Alcohol Use Yes   • Alcohol/week: 5 0 standard drinks   • Types: 5 Glasses of wine per week    Comment: social     Social History     Substance and Sexual Activity   Drug Use No     Social History     Tobacco Use   Smoking Status Never   Smokeless Tobacco Never     Family History:   Family History   Problem Relation Age of Onset   • Lung cancer Father      Meds/Allergies   all current active meds have been reviewed    No Known Allergies    Objective   No intake or output data in the 24 hours ending 12/01/22 1516  Invasive Devices     Peripheral Intravenous Line  Duration           Peripheral IV 12/01/22 Left Antecubital <1 day              Physical Exam  Vitals and nursing note reviewed  Constitutional:       Appearance: She is not toxic-appearing  HENT:      Head: Normocephalic  Comments: Left temporal area swelling and ecchymosis with dry blood on scalp and face      Mouth/Throat:      Mouth: Mucous membranes are dry  Comments: Poor dentition with cracks and chips front teeth   Eyes:      Conjunctiva/sclera:      Left eye: Hemorrhage present  Neck:      Comments: Bruising base of neck at superior sternum     Voice hoarse  Cardiovascular:      Rate and Rhythm: Normal rate  Pulses: Normal pulses  Pulmonary:      Breath sounds: Wheezing present  Comments: Shallow respirations  Abdominal:      Palpations: Abdomen is soft  Tenderness: There is no abdominal tenderness  Musculoskeletal:      Cervical back: Neck supple  Right lower leg: No edema  Left lower leg: No edema  Comments: Diffuse subcutaneous fat and muscle wasting   Skin:     General: Skin is warm and dry  Comments: Facial ecchymosis, bruising and abrasions on BLE BUE in various stages of healing     Thin and friable    Neurological:      Mental Status: She is alert  Comments: Awake and alert, oriented only to self, does not ans ques appropriately or follow commands   Psychiatric:      Comments: Pleasantly confused impulsive with no safety awareness       Lab Results:     I have personally reviewed pertinent lab results      I have personally reviewed the following imaging study reports in PACS:    12/1/22-chest x-ray, CT head without contrast, CT C-spine without contrast, CT chest abdomen pelvis with contrast    Therapies:   PT: pending   OT: pending      VTE Prophylaxis: Enoxaparin (Lovenox)    Code Status: Level 3 - DNAR and DNI  Advance Directive and Living Will:      Power of :    POLST:      Family and Social Support: nephew     Goals of Care: pain control

## 2022-12-01 NOTE — H&P
H&P - Trauma   Indigo Ramirez 80 y o  female MRN: [id number]  Unit/Bed#: TR 02 Encounter: [id number]    Trauma Alert: Level B   Model of Arrival: Ambulance    Trauma Team: Attending Lon Starks and Residents Yovanny Board  Consultants:     None     Assessment/Plan   Active Problems / Assessment:   -79 yo female presented to [facility] as a level B trauma due to fall from standing height and head strike  Patient with confusion on arrival to ED  No known medications per   No blood thinners per   Most likely in setting of post concussive syndrome vs  Intracranial hematoma  -L 3-7 rib fractures  -Scalp hematoma, abrasion  -Acute encephalopathy   -Hypertension     Plan:    - Admit SD1  - Rib fx protocol  - Rest of care per ICU/CC team      History of Present Illness     Chief Complaint: confusion  Mechanism:Fall     HPI:    Indigo Ramirez is a 80 y o  female who presented as a level b trauma s/p fall from standing height at home  Either the neighbor or  called EMS after the patient fell approximately 2 hours ago, as of right now unclear  Possibly down for 2 hours   also confused and poor historian  Patient is confused on presentation to the ED  No PMHX or PSHx because unable to be obtained at this point in time  Was in sinus tachycardia on presentation  Hemodynamically stable  Review of Systems   Unable to perform ROS: Mental status change     12-point, complete review of systems was reviewed and negative except as stated above       Historical Information     Past Medical History:   Diagnosis Date   • Anosmia    • Cellulitis    • Diverticulitis, colon    • Fatigue 5/15/2017   • Generalized osteoarthritis of multiple sites    • Hyperlipidemia    • Hypertension    • Impacted cerumen of both ears    • Impaired fasting glucose 7/4/2012   • Lyme disease    • Macular degeneration    • Tremor      Past Surgical History:   Procedure Laterality Date   • BREAST BIOPSY     • CATARACT EXTRACTION     • HYSTERECTOMY Social History     Tobacco Use   • Smoking status: Never   • Smokeless tobacco: Never   Substance Use Topics   • Alcohol use: Yes     Alcohol/week: 5 0 standard drinks     Types: 5 Glasses of wine per week     Comment: social   • Drug use: No     Immunization History   Administered Date(s) Administered   • COVID-19 PFIZER VACCINE 0 3 ML IM 03/04/2021, 03/25/2021   • INFLUENZA 09/14/2016, 09/25/2017   • Influenza Split High Dose Preservative Free IM 09/14/2015, 09/14/2016, 09/25/2017   • Influenza, high dose seasonal 0 7 mL 10/04/2018, 10/24/2019   • Influenza, seasonal, injectable 10/03/2011, 12/20/2012, 10/18/2013   • Pneumococcal Conjugate 13-Valent 09/14/2015   • Pneumococcal Polysaccharide PPV23 01/01/2007, 03/20/2017   • Td (adult), adsorbed 01/01/1990, 10/08/2000     Last Tetanus: 12/1/2022  Family History: Non-contributory    Meds/Allergies   all current active meds have been reviewed  Allergies have not been reviewed; No Known Allergies    Objective   Initial Vitals:        Primary Survey:   Airway:        Status: patent;        Pre-hospital Interventions: none        Hospital Interventions: none  Breathing:        Pre-hospital Interventions: none       Effort: normal       Right breath sounds: normal       Left breath sounds: normal  Circulation:        Rhythm: regular       Rate: regular   Right Pulses Left Pulses    R radial: 2+    R pedal: 2+     L radial: 2+    L pedal: 2+       Disability:        GCS: Eye: 4; Verbal: 4 Motor: 6 Total: 14       Right Pupil:       Left Pupil:     R Motor Strength L Motor Strength    R : 5/5  R dorsiflex: 5/5  R plantarflex: 5/5 L : 5/5  L dorsiflex: 5/5  L plantarflex: 5/5        Sensory:  No sensory deficit  Exposure:       Completed: Yes      Secondary Survey:  Physical Exam  Constitutional:       General: She is not in acute distress  Appearance: Normal appearance  HENT:      Head: Normocephalic and atraumatic        Nose: Nose normal  Mouth/Throat:      Mouth: Mucous membranes are moist       Pharynx: Oropharynx is clear  Eyes:      Extraocular Movements: Extraocular movements intact  Comments: Abrasion over left eye  Cardiovascular:      Rate and Rhythm: Normal rate and regular rhythm  Pulses: Normal pulses  Heart sounds: Normal heart sounds  Pulmonary:      Effort: Pulmonary effort is normal       Breath sounds: Normal breath sounds  Abdominal:      General: Abdomen is flat  Palpations: Abdomen is soft  Musculoskeletal:         General: Normal range of motion  Cervical back: No rigidity or tenderness  Comments: Tenderness to palpation of thoracic spine  Skin:     General: Skin is warm and dry  Neurological:      Mental Status: She is disoriented  Invasive Devices     None               Lab Results:   Results: I have personally reviewed all pertinent laboratory/tests results, BMP/CMP:   Lab Results   Component Value Date    CO2 25 12/01/2022    GLUCOSE 108 12/01/2022   , CBC:   Lab Results   Component Value Date    HGB 14 3 12/01/2022    HCT 42 12/01/2022   , Coagulation: No results found for: PT, INR, APTT, Lactate: No results found for: LACTATE, Amylase: No results found for: AMYLASE, Lipase: No results found for: LIPASE, AST: No results found for: AST, ALT: No results found for: ALT, Urinalysis: No results found for: Irean Heck, SPECGRAV, PHUR, LEUKOCYTESUR, NITRITE, PROTEINUA, GLUCOSEU, KETONESU, BILIRUBINUR, BLOODU, CK: No results found for: CKTOTAL, Troponin: No results found for: TROPONINI, EtOH: No results found for: ETOH, UDS: No components found for: RAPIDDRUGSCREEN, Pregnancy: No results found for: PREGTESTUR, ABG: No results found for: PHART, BCL6HWV, PO2ART, MAM0AEF, P3EFAPIJ, BEART, SOURCE and ISTAT: No components found for: VBG    Imaging Results: I have personally reviewed pertinent reports      Chest Xray(s): positive for acute findings: rib fx   FAST exam(s): negative for acute findings   CT Scan(s): positive for acute findings: Rib fractures, scalp hematoma   Additional Xray(s): negative for acute findings     Other Studies: N/A    Code Status: Prior  Advance Directive and Living Will:      Power of :    POLST:    I have spent 35 minutes with Patient and family today in which greater than 50% of this time was spent in counseling/coordination of care regarding Diagnostic results, Prognosis, Intructions for management, Patient and family education and Impressions

## 2022-12-01 NOTE — ED PROVIDER NOTES
Emergency Department Airway Evaluation and Management Form    History  Obtained from:  EMS  Fall with head injury  Altered mental status according to EMS  Review of patient's allergies indicates no known allergies  Chief Complaint:  Trauma Alert    HPI: Pt is a 80 y o  female presents s/p fall head injury altered mental status according the EMS      I have reviewed and agree with the history as documented  Physical Exam    Vitals:    12/01/22 1130   BP: (!) 194/86   Pulse: 105   Resp: 22   Temp: 99 1 °F (37 3 °C)   SpO2: 100%     Supplemental Oxygen:  Yes    GCS:  14    Neuro: Alert and oriented  Psych: not combative, not anxious, cooperative for exam  Neck: In collar, No JVD, No midline tenderness  Cardio:  Normal  Respiratory: Normal  Mouth:  Normal  Pharynx: Normal    Monitor:  NSR      ED Medications    No current facility-administered medications for this encounter      Current Outpatient Medications:   •  bimatoprost (LUMIGAN) 0 01 % ophthalmic drops, Administer 1 drop to the right eye daily at bedtime, Disp: , Rfl:   •  Multiple Vitamins-Minerals (ICAPS MV PO), Take by mouth, Disp: , Rfl:       Intubation    No intubation required    Final Diagnosis:  Fall head injury    ED Provider  Electronically Signed by       Samra Rogel MD  12/01/22 1614

## 2022-12-01 NOTE — CASE MANAGEMENT
Case Management Progress Note    Patient name Chinyere More  Location TR 02/TR 02 MRN [id number]  : 3/7/1926 Date 2022       LOS (days): 0  Geometric Mean LOS (GMLOS) (days):   Days to GMLOS:        OBJECTIVE:        Current admission status: Emergency  Preferred Pharm[facility]:   [address] [facility] [address]  Phone: [phone] Fax: [phone]    Primary Care Provider: Olga Rodrigez MD    Primary Insurance: MEDICARE  Secondary Insurance: AARP    PROGRESS NOTE:    CM responded to trauma alert  Pt was brought to the ED via [facility] s/p fall  Pt is confused  Pt c/o T spine tenderness   Pt's  is in the family waiting room

## 2022-12-01 NOTE — PROCEDURES
POC FAST US    Date/Time: 12/1/2022 11:43 AM  Performed by: Padmini Tirado MD  Authorized by:  Padmini Tirado MD     Patient location:  ED  Other Assisting Provider: Yes (comment) (Dr Phoenix Barrientos, Dr Zuly Clemens)    Procedure details:     Exam Type:  Diagnostic    Indications: blunt abdominal trauma      Assess for:  Hemothorax, intra-abdominal fluid, pericardial effusion and pneumothorax    Technique: FAST      Views obtained:  Heart - Pericardial sac, RUQ - Mike's Pouch, LUQ - Splenorenal space and Suprapubic - Pouch of Wilfredo    Image quality: diagnostic      Image availability:  Video obtained  FAST Findings:     RUQ (Hepatorenal) free fluid: absent      LUQ (Splenorenal) free fluid: absent      Suprapubic free fluid: absent      Cardiac wall motion: identified      Pericardial effusion: absent    Interpretation:     Impressions: negative

## 2022-12-01 NOTE — PROGRESS NOTES
Acceptance Note- Critical Care   Wai Moreno 80 y o  female MRN: [id number]  Unit/Bed#: ICU 08 Encounter: [id number]    -------------------------------------------------------------------------------------------------------------  Chief Complaint: Rib fractures    History of Present Illness   HX and PE limited by: Bashir Duarte is a 80 y o  female with PMHx of HTN and HLD who presented to the ER today as a level B trauma alert after several falls at home  Unclear if neighbor or  called EMS, but patient was likely down for approximately 2 hours  She was found to have multiple left sided rib fractures and was hypertensive to the 200s in the trauma bay  Patient is being admitted to [facility] for pulmonary toilet in setting of multiple rib fractures       History obtained from chart review and the patient   -------------------------------------------------------------------------------------------------------------  Assessment and Plan:    Neuro:   • Acute pain  o Scheduled tylenol  o Prn oxycodone, lidoderm patch  o Appreciate geriatrics consult  • Acute encephalopathy  o Unclear baseline, patient and  are both hard of hearing and poor historians  o Daily CAM-ICU, delirium precautions      CV:   • Hypertension  o Hold home lisinopril  o Hydralazine prn      Pulm:  • Acute rib fractures (L 3-7)  o Rib fracture protocol   o Goal SpO2 > 92%, wean supplemental O2 as able  o Encourage IS, pulmonary toilet      GI:   • NPO      :   • No active issues      F/E/N:   • Isolyte at 75cc/hr  • Replete electrolytes as needed for goal Mag > 2 0, Phos >3 0, K >4 0  • NPO, advance diet as tolerated      Heme/Onc:   • DVT ppx  o Lovenox BID      Endo:   • No active issues      ID:   • Trend fever curve/white count      MSK/Skin:   • Frequent turns/repositioning  • PT/OT    Disposition: Admit to Critical Care   Code Status: Level 3 - DNAR and DNI  --------------------------------------------------------------------------------------------------------------  Review of Systems    A 12-point, complete review of systems was reviewed and negative except as stated above     Physical Exam  Vitals and nursing note reviewed  Constitutional:       General: She is not in acute distress  Appearance: She is normal weight  She is ill-appearing  HENT:      Head: Normocephalic and atraumatic  Eyes:      Comments: L conjunctival hemorrhage   Cardiovascular:      Rate and Rhythm: Normal rate and regular rhythm  Heart sounds: Normal heart sounds  Heart sounds not distant  No murmur heard  No friction rub  No gallop  Pulmonary:      Effort: Pulmonary effort is normal       Breath sounds: Normal breath sounds  No decreased breath sounds, wheezing, rhonchi or rales  Abdominal:      General: There is distension  Palpations: Abdomen is soft  Tenderness: There is no abdominal tenderness  Musculoskeletal:      Right lower leg: No edema  Left lower leg: No edema  Skin:     Comments: Multiple healing skin abrasions/scabs   Neurological:      General: No focal deficit present  Mental Status: She is alert and oriented to person, place, and time  Psychiatric:         Behavior: Behavior is cooperative        --------------------------------------------------------------------------------------------------------------  Vitals:   Vitals:    12/01/22 1245 12/01/22 1315 12/01/22 1415 12/01/22 1503   BP: (!) 204/94 (!) 205/94 (!) 173/84 155/75   Pulse: 100 96 70 80   Resp: 20 (!) 23 (!) 23 18   Temp:    97 8 °F (36 6 °C)   TempSrc:    Oral   SpO2: 99% 100% 96% 97%   Weight:         Temp  Min: 97 8 °F (36 6 °C)  Max: 99 1 °F (37 3 °C)        Body mass index is 25 86 kg/m²    N/A    Laboratory and Diagnostics:  Results from last 7 days   Lab Units 12/01/22  1145 12/01/22  1137   WBC Thousand/uL 5 56  --    HEMOGLOBIN g/dL 15 1  --    I STAT HEMOGLOBIN g/dl  --  14 3   HEMATOCRIT % 44 5  --    HEMATOCRIT, ISTAT %  --  42   PLATELETS Thousands/uL 227  --    NEUTROS PCT % 81*  --    MONOS PCT % 11  --      Results from last 7 days   Lab Units 12/01/22  1145 12/01/22  1137   SODIUM mmol/L 140  --    POTASSIUM mmol/L 3 5  --    CHLORIDE mmol/L 108  --    CO2 mmol/L 23  --    CO2, I-STAT mmol/L  --  25   ANION GAP mmol/L 9  --    BUN mg/dL 16  --    CREATININE mg/dL 0 87  --    CALCIUM mg/dL 9 0  --    GLUCOSE RANDOM mg/dL 106  --    ALT U/L 33  --    AST U/L 53*  --    ALK PHOS U/L 81  --    ALBUMIN g/dL 3 3*  --    TOTAL BILIRUBIN mg/dL 1 43*  --           Results from last 7 days   Lab Units 12/01/22  1145   INR  0 88   PTT seconds 26        Imaging: CT C/A/P: 1   Multiple mildly displaced/nondisplaced fractures involving the anterolateral aspects of ribs 3 through 7 on the left  No CT evidence of acute traumatic sequelae within elsewhere within the chest, abdomen, or pelvis      2  Soft tissue lesion within the right kidney measuring 2 5 cm, highly concerning for renal carcinoma  Urology evaluation is recommended      3   Groundglass changes within the superior segment of the right lower lobe measuring 2 6 x 1 1 cm, suspicious for developing pneumonia  Follow-up chest CT is recommended in 2-3 months to monitor for resolution given the additional findings detailed in   impression #2  There are multiple bilateral pulmonary nodules measuring up to 9 mm which appear stable from prior CT of 2017      4   Colonic diverticulosis without evidence of acute diverticulitis      5  The rectum is moderately distended with fecal material measuring up to 6 5 cm in greatest diameter  No evidence of rectal wall thickening or perirectal inflammatory changes      6   Subcentimeter hypodensity within the spleen is too small to accurately characterize and is of indeterminate clinical sulcus    This can be evaluated with a nonemergent ultrasound as clinically warranted  I have personally reviewed pertinent reports  Historical Information   Past Medical History:   Diagnosis Date   • Anosmia    • Cellulitis    • Diverticulitis, colon    • Fatigue 5/15/2017   • Generalized osteoarthritis of multiple sites    • Hyperlipidemia    • Hypertension    • Impacted cerumen of both ears    • Impaired fasting glucose 7/4/2012   • Lyme disease    • Macular degeneration    • Tremor      Past Surgical History:   Procedure Laterality Date   • BREAST BIOPSY     • CATARACT EXTRACTION     • HYSTERECTOMY       Social History   Social History     Substance and Sexual Activity   Alcohol Use Yes   • Alcohol/week: 5 0 standard drinks   • Types: 5 Glasses of wine per week    Comment: social     Social History     Substance and Sexual Activity   Drug Use No     Social History     Tobacco Use   Smoking Status Never   Smokeless Tobacco Never       Family History:   Family History   Problem Relation Age of Onset   • Lung cancer Father      I have reviewed this patient's family history and commented on sigificant items within the HPI      Medications:  Current Facility-Administered Medications   Medication Dose Route Frequency   • acetaminophen (TYLENOL) tablet 975 mg  975 mg Oral Q8H [address]   • enoxaparin (LOVENOX) subcutaneous injection 30 mg  30 mg Subcutaneous Q24H   • hydrALAZINE (APRESOLINE) injection 5 mg  5 mg Intravenous Q6H PRN   • lidocaine (LIDODERM) 5 % patch 1 patch  1 patch Topical Daily   • multi-electrolyte (PLASMALYTE-A/ISOLYTE-S PH 7 4) IV solution  75 mL/hr Intravenous Continuous   • oxyCODONE (ROXICODONE) IR tablet 2 5 mg  2 5 mg Oral Q4H PRN     Home medications:  Prior to Admission Medications   Prescriptions Last Dose Informant Patient Reported? Taking?    Multiple Vitamins-Minerals (ICAPS MV PO)   Yes No   Sig: Take by mouth   bimatoprost (LUMIGAN) 0 01 % ophthalmic drops   Yes No   Sig: Administer 1 drop to the right eye daily at bedtime      Facility-Administered Medications: None     Allergies:  No Known Allergies  ------------------------------------------------------------------------------------------------------------  Advance Directive and Living Will:      Power of Attorney:    POLST:    ------------------------------------------------------------------------------------------------------------  Anticipated Length of Stay is > 2 midnights    Care Time Delivered:   No Critical Care time spent       [facility], PA-C

## 2022-12-02 LAB
ALBUMIN SERPL BCP-MCNC: 2.6 G/DL (ref 3.5–5)
ALP SERPL-CCNC: 70 U/L (ref 46–116)
ALT SERPL W P-5'-P-CCNC: 36 U/L (ref 12–78)
ANION GAP SERPL CALCULATED.3IONS-SCNC: 10 MMOL/L (ref 4–13)
APTT PPP: 35 SECONDS (ref 23–37)
AST SERPL W P-5'-P-CCNC: 75 U/L (ref 5–45)
ATRIAL RATE: 277 BPM
BASOPHILS # BLD AUTO: 0.01 THOUSANDS/ÂΜL (ref 0–0.1)
BASOPHILS NFR BLD AUTO: 0 % (ref 0–1)
BILIRUB SERPL-MCNC: 1.42 MG/DL (ref 0.2–1)
BUN SERPL-MCNC: 16 MG/DL (ref 5–25)
CALCIUM ALBUM COR SERPL-MCNC: 9.5 MG/DL (ref 8.3–10.1)
CALCIUM SERPL-MCNC: 8.4 MG/DL (ref 8.3–10.1)
CHLORIDE SERPL-SCNC: 110 MMOL/L (ref 96–108)
CO2 SERPL-SCNC: 21 MMOL/L (ref 21–32)
CREAT SERPL-MCNC: 0.89 MG/DL (ref 0.6–1.3)
EOSINOPHIL # BLD AUTO: 0.01 THOUSAND/ÂΜL (ref 0–0.61)
EOSINOPHIL NFR BLD AUTO: 0 % (ref 0–6)
ERYTHROCYTE [DISTWIDTH] IN BLOOD BY AUTOMATED COUNT: 12.7 % (ref 11.6–15.1)
FLUAV RNA RESP QL NAA+PROBE: NEGATIVE
FLUBV RNA RESP QL NAA+PROBE: NEGATIVE
GFR SERPL CREATININE-BSD FRML MDRD: 54 ML/MIN/1.73SQ M
GLUCOSE SERPL-MCNC: 83 MG/DL (ref 65–140)
HCT VFR BLD AUTO: 43.6 % (ref 34.8–46.1)
HGB BLD-MCNC: 15.3 G/DL (ref 11.5–15.4)
IMM GRANULOCYTES # BLD AUTO: 0.03 THOUSAND/UL (ref 0–0.2)
IMM GRANULOCYTES NFR BLD AUTO: 0 % (ref 0–2)
INR PPP: 1.24 (ref 0.84–1.19)
LYMPHOCYTES # BLD AUTO: 0.83 THOUSANDS/ÂΜL (ref 0.6–4.47)
LYMPHOCYTES NFR BLD AUTO: 12 % (ref 14–44)
MAGNESIUM SERPL-MCNC: 2.4 MG/DL (ref 1.6–2.6)
MCH RBC QN AUTO: 33.3 PG (ref 26.8–34.3)
MCHC RBC AUTO-ENTMCNC: 35.1 G/DL (ref 31.4–37.4)
MCV RBC AUTO: 95 FL (ref 82–98)
MONOCYTES # BLD AUTO: 0.54 THOUSAND/ÂΜL (ref 0.17–1.22)
MONOCYTES NFR BLD AUTO: 8 % (ref 4–12)
NEUTROPHILS # BLD AUTO: 5.34 THOUSANDS/ÂΜL (ref 1.85–7.62)
NEUTS SEG NFR BLD AUTO: 80 % (ref 43–75)
NRBC BLD AUTO-RTO: 0 /100 WBCS
PHOSPHATE SERPL-MCNC: 3.8 MG/DL (ref 2.3–4.1)
PLATELET # BLD AUTO: 202 THOUSANDS/UL (ref 149–390)
PMV BLD AUTO: 9.1 FL (ref 8.9–12.7)
POTASSIUM SERPL-SCNC: 4.4 MMOL/L (ref 3.5–5.3)
PROT SERPL-MCNC: 6 G/DL (ref 6.4–8.4)
PROTHROMBIN TIME: 15.9 SECONDS (ref 11.6–14.5)
QRS AXIS: -20 DEGREES
QRSD INTERVAL: 68 MS
QT INTERVAL: 382 MS
QTC INTERVAL: 502 MS
RBC # BLD AUTO: 4.59 MILLION/UL (ref 3.81–5.12)
RSV RNA RESP QL NAA+PROBE: NEGATIVE
SARS-COV-2 RNA RESP QL NAA+PROBE: POSITIVE
SODIUM SERPL-SCNC: 141 MMOL/L (ref 135–147)
T WAVE AXIS: 35 DEGREES
TSH SERPL DL<=0.05 MIU/L-ACNC: 2.05 UIU/ML (ref 0.45–4.5)
VENTRICULAR RATE: 104 BPM
VIT B12 SERPL-MCNC: 554 PG/ML (ref 100–900)
WBC # BLD AUTO: 6.76 THOUSAND/UL (ref 4.31–10.16)

## 2022-12-02 RX ORDER — WATER 1000 ML/1000ML
INJECTION, SOLUTION INTRAVENOUS
Status: DISPENSED
Start: 2022-12-02 | End: 2022-12-03

## 2022-12-02 RX ORDER — OLANZAPINE 10 MG/1
10 INJECTION, POWDER, LYOPHILIZED, FOR SOLUTION INTRAMUSCULAR ONCE
Status: COMPLETED | OUTPATIENT
Start: 2022-12-02 | End: 2022-12-02

## 2022-12-02 RX ORDER — HEPARIN SODIUM 5000 [USP'U]/ML
5000 INJECTION, SOLUTION INTRAVENOUS; SUBCUTANEOUS EVERY 8 HOURS SCHEDULED
Status: DISCONTINUED | OUTPATIENT
Start: 2022-12-02 | End: 2022-12-05

## 2022-12-02 RX ORDER — OLANZAPINE 5 MG/1
5 TABLET, ORALLY DISINTEGRATING ORAL ONCE
Status: DISCONTINUED | OUTPATIENT
Start: 2022-12-02 | End: 2022-12-02

## 2022-12-02 RX ORDER — ALBUTEROL SULFATE 2.5 MG/3ML
2.5 SOLUTION RESPIRATORY (INHALATION) EVERY 6 HOURS PRN
Status: DISCONTINUED | OUTPATIENT
Start: 2022-12-02 | End: 2022-12-02

## 2022-12-02 RX ORDER — AMLODIPINE BESYLATE 2.5 MG/1
2.5 TABLET ORAL DAILY
Status: DISCONTINUED | OUTPATIENT
Start: 2022-12-02 | End: 2022-12-07

## 2022-12-02 RX ORDER — LABETALOL HYDROCHLORIDE 5 MG/ML
10 INJECTION, SOLUTION INTRAVENOUS EVERY 6 HOURS PRN
Status: DISCONTINUED | OUTPATIENT
Start: 2022-12-02 | End: 2022-12-15 | Stop reason: HOSPADM

## 2022-12-02 RX ORDER — ALBUTEROL SULFATE 90 UG/1
2 AEROSOL, METERED RESPIRATORY (INHALATION) EVERY 4 HOURS PRN
Status: DISCONTINUED | OUTPATIENT
Start: 2022-12-02 | End: 2022-12-15 | Stop reason: HOSPADM

## 2022-12-02 RX ORDER — ALBUTEROL SULFATE 90 UG/1
2 AEROSOL, METERED RESPIRATORY (INHALATION) EVERY 4 HOURS PRN
Status: DISCONTINUED | OUTPATIENT
Start: 2022-12-02 | End: 2022-12-02

## 2022-12-02 RX ADMIN — SODIUM CHLORIDE, SODIUM GLUCONATE, SODIUM ACETATE, POTASSIUM CHLORIDE, MAGNESIUM CHLORIDE, SODIUM PHOSPHATE, DIBASIC, AND POTASSIUM PHOSPHATE 75 ML/HR: .53; .5; .37; .037; .03; .012; .00082 INJECTION, SOLUTION INTRAVENOUS at 20:09

## 2022-12-02 RX ADMIN — Medication 1 TABLET: at 10:13

## 2022-12-02 RX ADMIN — OLANZAPINE 10 MG: 10 INJECTION, POWDER, FOR SOLUTION INTRAMUSCULAR at 23:04

## 2022-12-02 RX ADMIN — ACETAMINOPHEN 975 MG: 325 TABLET ORAL at 13:47

## 2022-12-02 RX ADMIN — LIDOCAINE 5% 1 PATCH: 700 PATCH TOPICAL at 10:12

## 2022-12-02 RX ADMIN — HYDRALAZINE HYDROCHLORIDE 5 MG: 20 INJECTION, SOLUTION INTRAMUSCULAR; INTRAVENOUS at 03:13

## 2022-12-02 RX ADMIN — ACETAMINOPHEN 975 MG: 325 TABLET ORAL at 06:17

## 2022-12-02 RX ADMIN — BIMATOPROST 1 DROP: 0.1 SOLUTION/ DROPS OPHTHALMIC at 22:19

## 2022-12-02 RX ADMIN — ACETAMINOPHEN 975 MG: 325 TABLET ORAL at 21:46

## 2022-12-02 RX ADMIN — AMLODIPINE BESYLATE 2.5 MG: 2.5 TABLET ORAL at 10:13

## 2022-12-02 RX ADMIN — HEPARIN SODIUM 5000 UNITS: 5000 INJECTION INTRAVENOUS; SUBCUTANEOUS at 21:46

## 2022-12-02 RX ADMIN — HEPARIN SODIUM 5000 UNITS: 5000 INJECTION INTRAVENOUS; SUBCUTANEOUS at 13:47

## 2022-12-02 NOTE — PROGRESS NOTES
Progress Note - Geriatric Medicine   Olga Luther 80 y o  female MRN: [id number]  [facility] En[facility]: [id number]      Assessment/Plan:    Dementia without behavior disturbance  -moderate and progressive  -at baseline requires assist with ADLs and iADLs  -currently in soft restraints due to inability to follow directions and no safety awareness and continually attempting to get up w/o assist/pulling    -TSH and B12 checked this morning, WNL  -given age, co-morbidities and severity of underlying dementia will likely require long term care placement on hosp d/c, may benefit from hospice/palliative consult as o/p   -continue psychosocial support of pt and family     Ambulatory dysfunction with fall  -recurrent recent unwitnessed falls at home  -injuries as below  -remains high risk recurrent falls, continue fall precautions  -PT/OT following recommend rehab on discharge    Left sided rib fractures (3-7)  -s/p fall home  -noted on CT chest obtained on admission  -currently saturating well on room air, to advanced dementia and confusion unable to follow instruction to precipitate with pulmonary toilet or ISS  -continue acute multimodal pain control per geriatric protocol    Hypertensive urgency  -continue optimization of hemodynamics     Frailty syndrome in geriatric patient  -moderate to severely frail due to age and co-morbidities  -continue to encourage well balanced nutrition intake and optimization chronic medical conditions     Care coordination: rounded with Zachariah Henson (RN),  updated at bedside     Subjective: Dakota Damon is seen and examined at bedside where she is lying with her  at her side, she is disoriented and does not answer questions or follow commands  She is in soft wrist and waistbelt restraint due to impulsivity and inability to follow commands and interferes with necessary medical care       Review of Systems   Unable to perform ROS: Dementia     Objective:     Vitals: Blood pressure 144/87, pulse (!) 109, temperature 98 4 °F (36 9 °C), resp  rate 22, height 4' 10" (1 473 m), weight 56 6 kg (124 lb 12 5 oz), SpO2 99 %, not currently breastfeeding  ,Body mass index is 26 08 kg/m²  Intake/Output Summary (Last 24 hours) at 12/2/2022 1515  Last data filed at 12/2/2022 0400  Gross per 24 hour   Intake 1020 ml   Output 760 ml   Net 260 ml     Current Medications: Reviewed    Physical Exam:   Physical Exam  Vitals and nursing note reviewed  Constitutional:       Appearance: She is not toxic-appearing  HENT:      Head: Normocephalic  Comments: Left facial ecchymosis and swelling     Nose: Nose normal       Mouth/Throat:      Mouth: Mucous membranes are dry  Comments: Poor dentition  Eyes:      Comments: Left conjunctival hemorrhage   Cardiovascular:      Rate and Rhythm: Normal rate  Pulses: Normal pulses  Pulmonary:      Effort: Pulmonary effort is normal  No respiratory distress  Breath sounds: No wheezing  Abdominal:      Palpations: Abdomen is soft  Tenderness: There is no abdominal tenderness  Musculoskeletal:      Cervical back: Neck supple  Comments: Reduced overall muscle mass    Skin:     Coloration: Skin is pale  Comments: Thin and friable, abrasions and excoriations on face and arms and leg    Neurological:      Mental Status: She is alert  Comments: Awake and alert, completely disoriented, hallucinating talking to people not in room, picking at things in air   Psychiatric:      Comments: Confused, impulsive, inattentive with no safety awareness         Invasive Devices     Peripheral Intravenous Line  Duration           Peripheral IV 12/01/22 Left;Ventral (anterior) Forearm <1 day          Drain  Duration           External Urinary Catheter 1 day              Lab, Imaging and other studies: I have personally reviewed pertinent reports

## 2022-12-02 NOTE — SPEECH THERAPY NOTE
Speech Language/Pathology  Speech/Language Pathology  Assessment    Patient Name: Chinyere More  LCJOB'X Date: 12/2/2022     Problem List  Principal Problem:    Fracture of multiple ribs of left side  Active Problems:    Hypertension    Past Medical History  Past Medical History:   Diagnosis Date   • Anosmia    • Cellulitis    • Diverticulitis, colon    • Fatigue 5/15/2017   • Generalized osteoarthritis of multiple sites    • Hyperlipidemia    • Hypertension    • Impacted cerumen of both ears    • Impaired fasting glucose 7/4/2012   • Lyme disease    • Macular degeneration    • Tremor      Past Surgical History  Past Surgical History:   Procedure Laterality Date   • BREAST BIOPSY     • CATARACT EXTRACTION     • HYSTERECTOMY        Bedside Swallow Evaluation:    Summary:  Pt presented w/ s/s suggestive of mild oropharyngeal dysphagia maldonado by poor lip seal and draw from a straw, some anterior spillage noted with thin liquid trials, poor bolus formation, control, slow mastication, delayed transfers, suspected delayed swallow, with minimal oral residue with soft solid  Pt noted to cough and throat clear during thin liquid trials  No coughing noted with soft solid or nectar thick liquids  Pt altered mental state throughout trials  ST to f/u as able  Recommendations:  Diet: Level 2  Liquid: Nectar thick   Meds: Crushed in puree   Supervision: Close   Positioning:Upright  Strategies:   Pt to take PO/Meds only when fully alert and upright  Oral care: Frequent   Aspiration precautions  Therapy Prognosis: Guarded   Prognosis considerations: advanced age, medical status, medical hx   Frequency: 2-3x week as able     Consider consult w/: N/A     Goal(s):  Pt will tolerate least restrictive diet w/out s/s aspiration or oral/pharyngeal difficulties       Dysphagia LTG  -Patient will demonstrate safe and effective oral intake (without overt s/s significant oral/pharyngeal dysphagia including s/s penetration or aspiration) for the highest appropriate diet level  Short Term Goals:  Patient will tolerate trials of upgraded food and/or liquid texture with no significant s/s of oral or pharyngeal dysphagia including aspiration across 1-3 diagnostic sessions     H&P/Admit info/ pertinent provider notes: (PMH noted above)  79 yo female presented to [facility] as a level B trauma due to fall from standing height and head strike  Patient with confusion on arrival to ED  PMH of HTN, HLD, Vit D deficiency, tremor and macular degeneratio No known medications per   No blood thinners per   Most likely in setting of post concussive syndrome vs  Intracranial hematoma  -L 3-7 rib fractures  -Scalp hematoma, abrasion  -Acute encephalopathy   -Hypertension      Special Studies:  CT Chest Abdomen 12/1/22:   1  Multiple mildly displaced/nondisplaced fractures involving the anterolateral aspects of ribs 3 through 7 on the left  No CT evidence of acute traumatic sequelae within elsewhere within the chest, abdomen, or pelvis  2   Soft tissue lesion within the right kidney measuring 2 5 cm, highly concerning for renal carcinoma  Urology evaluation is recommended  3   Groundglass changes within the superior segment of the right lower lobe measuring 2 6 x 1 1 cm, suspicious for developing pneumonia  Follow-up chest CT is recommended in 2-3 months to monitor for resolution given the additional findings detailed in   impression #2  There are multiple bilateral pulmonary nodules measuring up to 9 mm which appear stable from prior CT of 2017  4   Colonic diverticulosis without evidence of acute diverticulitis  5   The rectum is moderately distended with fecal material measuring up to 6 5 cm in greatest diameter  No evidence of rectal wall thickening or perirectal inflammatory changes  6   Subcentimeter hypodensity within the spleen is too small to accurately characterize and is of indeterminate clinical sulcus    This can be evaluated with a nonemergent ultrasound as clinically warranted  CT Spine 12/1/22:   No evidence of acute cervical spine fracture or traumatic malalignment  CT head 12/1/22:   1  No CT evidence of acute intracranial process  2   Soft tissue laceration of the left superolateral periorbital soft tissues with surrounding swelling extending into the left facial soft tissues  Superior scalp hematoma is also noted  Superior scalp hematoma is also noted  3   Age-related volume loss with patchy areas of hypoattenuation within the periventricular and subcortical white matter which are likely the basis of chronic microangiopathy  XR chest 12/1/22:   No acute cardiopulmonary disease within limitations of supine imaging  Previous VBS:  None reported     Patient's goal: Pt did not state specific goal     Did the pt report pain? None noted   If yes, was nursing notified/was it addressed? -    Reason for consult:  R/o aspiration  Determine safest and least restrictive diet    Precautions:  Aspiration  Fall    Food Allergies: No known allergies    Current Diet: NPO    Premorbid diet: Regular with thin    O2 requirement: NC    Social/Prior living Home with    Voice/Speech: Weak with low volume    Follows commands: Inconsistently    Cognitive status: Altered mental status      Oral [facility] exam:  Dentition: natural, some missing on bottom  Lips (VII): no gross assymetry   Tongue (XII): unable to protrude to command   Mandible (V): functional movement  Face/oral sensation (V):wfl   Velum (X): unable to visualize   Secretion management:wfl   Volitional cough: weak   Volitional swallow: delayed   Oral care: did not mention frequency     Items administered:  Puree, soft solid, hard solid, honey thick liquid, nectar thick liquid, thin liquids   Liquids were taken by straw/tsp     Oral stage: Mild   Lip closure: weak, some anterior spillage, weak lip seal around straw   Mastication: slow and prolonged   Bolus formation: poor   Bolus control: poor   Transfer: delayed   Oral residue: minimal with soft solid    Pocketing: none noted     Pharyngeal stage: Mild   Swallow promptness: suspected delayed   Wet voice: junky voice before and after trials   Suctioned for mod amount of thick mucous prior to offering po  Throat clear: noted during thin liquid trials   Cough: noted with thin liquid trials   Secondary swallows: none noted   Audible swallows: noted with thin liquid trials     Esophageal stage:  No s/s reported    Aspiration precautions posted    Results d/w:  Pt, nursing

## 2022-12-02 NOTE — OCCUPATIONAL THERAPY NOTE
Occupational Therapy Evaluation     Patient Name: Octavia Hernandez  [id number] Date: 12/2/2022  Problem List  Principal Problem:    Fracture of multiple ribs of left side  Active Problems:    Hypertension    Past Medical History  Past Medical History:   Diagnosis Date    Anosmia     Cellulitis     Diverticulitis, colon     Fatigue 5/15/2017    Generalized osteoarthritis of multiple sites     Hyperlipidemia     Hypertension     Impacted cerumen of both ears     Impaired fasting glucose 7/4/2012    Lyme disease     Macular degeneration     Tremor      Past Surgical History  Past Surgical History:   Procedure Laterality Date    BREAST BIOPSY      CATARACT EXTRACTION      HYSTERECTOMY        12/02/22 0922   OT Last Visit   OT Visit Date 12/02/22   Note Type   Note type Evaluation   Pain Assessment   Pain Assessment Tool FLACC   Pain Location/Orientation Location: Abdomen;Orientation: Left; Location: Head;Location: Back   Pain Onset/Description Onset: Ongoing; Descriptor: Aching;Descriptor: Discomfort   Patient's Stated Pain Goal No pain   Hospital Pain Intervention(s) Repositioned; Ambulation/increased activity; Emotional support   Multiple Pain Sites Yes   Pain Rating: FLACC (Rest) - Face 1   Pain Rating: FLACC (Rest) - Legs 0   Pain Rating: FLACC (Rest) - Activity 0   Pain Rating: FLACC (Rest) - Cry 1   Pain Rating: FLACC (Rest) - Consolability 0   Score: FLACC (Rest) 2   Pain Rating: FLACC (Activity) - Face 1   Pain Rating: FLACC (Activity) - Legs 0   Pain Rating: FLACC (Activity) - Activity 0   Pain Rating: FLACC (Activity) - Cry 1   Pain Rating: FLACC (Activity) - Consolability 0   Score: FLACC (Activity) 2   Restrictions/Precautions   Weight Bearing Precautions Per Order No   Other Precautions Cognitive; Chair Alarm; Bed Alarm; Restraints;Multiple lines;Telemetry; Fall Risk;Pain  (posey belt; B/L wrist restraints)   Home Living   Type of [address] [place] [address] Two level;Bed/bath upstairs; Performs ADLs on one level; Able to live on main level with bedroom/bathroom  (20-24 ALEKSANDAR to main living area )   [place] Walk-in shower  (& tub/shower)   [place] [address] Other (Comment)  (denies any)   Home Equipment Other (Comment)  (denies any)   Additional Comments Pt is a poor historian; hx obtained from CM note  Per EMR, pt resides in 2 , 20-24 ALEKS[address]AR, 12 steps to basement where there is a bed and full bath (walk in & stand toilet); main full bed/bath on 2nd floor w/ 12 steps up (tub & stand toilet)   Prior Function   Level of [place] Independent with ADLs; Independent with functional mobility; Independent with IADLS   Lives With Spouse   Receives Help From Family   IADLs Independent with driving; Independent with meal prep; Independent with medication management   Falls in the last 6 months 1 to 4  (3)   Vocational Retired   Comments (-)    Lifestyle   Autonomy I w/ ADLS/IADLS, transfers and functional mobility PTA   Reciprocal Relationships Pt lives w/ her spouse   Service to Others Retired; worked at a bank   Intrinsic Gratification Likes to [address] 4  [address] [facility] 4  Minimal Assistance   [address] [address] 4  [address] 2  Maximal [facility] 200 4  [address] 2  Maximal [address]  3  Moderate [address] 3  Moderate Assistance   Functional Deficit Steadying;Verbal cueing;Supervision/safety; Increased time to complete   Bed Mobility   Supine to Sit 3  Moderate assistance   Additional items Assist x 1;HOB elevated; Increased time required;LE management;Verbal cues   Sit to Supine Unable to assess   Additional Comments Pt sat EOB w/ CGA for sitting balance/trunk control   Transfers   Sit to Stand 3  Moderate assistance   Additional items Assist x 1; Increased time required;Verbal cues   Stand to Sit 3 Moderate assistance   Additional items Assist x 1; Increased time required;Verbal cues   Additional Comments HHA used into standing; SBA 2nd for safety   Functional Mobility   Functional Mobility 3  Moderate assistance   Additional Comments Pt took few small steps from EOB to chair w/ Mod a x1; HHA used; SBA 2nd for safety  Additional items Hand hold assistance   Balance   Static Sitting Fair -   Dynamic Sitting Poor +   Static Standing Poor   Dynamic Standing Poor   Ambulatory Poor   Activity Tolerance   Activity Tolerance Patient limited by fatigue;Patient limited by pain   Medical Staff Made Aware PT   Nurse Made Aware yes, Magalie Sorensen Assessment   RUE Assessment X  (generalized weakness)   LUE Assessment   LUE Assessment X  (generalized weakness)   Hand Function   Gross Motor Coordination Functional   Fine Motor Coordination Functional   Cognition   Overall Cognitive Status Impaired   Arousal/Participation Responsive; Cooperative;Lethargic   Attention Difficulty attending to directions   Orientation Level Oriented to person;Disoriented to place; Disoriented to time;Disoriented to situation   Memory Decreased short term memory;Decreased recall of recent events;Decreased recall of precautions   Following Commands Follows one step commands with increased time or repetition   Comments Pt is cooperative; very confused; only oriented to self; has decreased understanding of deficits/safety awareness  Stating nonsensical statements unrelated to current situation (when asked where she was pt started saying she likes eating apples and pumpkin pie)  Assessment   Limitation Decreased ADL status; Decreased UE strength;Decreased UE ROM; Decreased Safe judgement during ADL;Decreased cognition;Decreased endurance;Decreased self-care trans;Decreased high-level ADLs   Prognosis Fair   Assessment Pt is a 79 y/o female seen for OT eval s/p adm to [facility] after a fall from standing height at home   Pt is dx'd w/  L sided rib fractures and scalp hematoma  Pt  has a past medical history of Anosmia, Cellulitis, Diverticulitis, colon, Fatigue (5/15/2017), Generalized osteoarthritis of multiple sites, Hyperlipidemia, Hypertension, Impacted cerumen of both ears, Impaired fasting glucose (2012), Lyme disease, Macular degeneration, and Tremor  Pt with active OT orders and up with assistance  orders  Pt lives with her spouse in 2 SH, 20-24 [address] to main level, 12 steps to 2nd floor and 12 steps down to basement; full bath in basement and on 2nd floor  Pt was I w/  ADLS and IADLS, does not drive, & required no use of DME PTA  Pt is currently demonstrating the following occupational deficits: Min A UB ADLS, Max A LB ADLS, Mod A bed mobility, transfers and functional mobility w/ HHA; SBA 2nd for safety  These deficits that are impacting pt's baseline areas of occupation are a result of the following impairments: pain, endurance, activity tolerance, functional mobility, forward functional reach, balance, trunk control, functional standing tolerance, unsupportive home environment, decreased I w/ ADLS/IADLS, strength, cognitive impairments, decreased safety awareness and decreased insight into deficits  The following Occupational Performance Areas to address include: eating, grooming, bathing/shower, toilet hygiene, dressing, medication management, socialization, health maintenance, functional mobility, community mobility, clothing management and household maintenance  Recommend inpatient rehab  upon D/C  Pt to continue to benefit from acute immediate OT services to address the following goals 2-4x/wk to  within the next 10-14 days:   Goals   Patient Goals none stated 2/2 confusion   LTG Time Frame 10-   Long Term Goal #1 see below listed goals   Plan   Treatment Interventions ADL retraining;Functional transfer training;Cognitive reorientation; Endurance training;UE strengthening/ROM; Patient/family training;Equipment evaluation/education; Compensatory technique education;Continued evaluation; Energy conservation; Activityengagement   Goal Expiration Date 12/16/22   OT Frequency Other (comment)  (2-4x/wk)   Recommendation   OT Discharge Recommendation Post acute rehabilitation services   Additional Comments  The patient's raw score on the AM-PAC Daily Activity inpatient short form is 15, standardized score is 34 69, less than 39 4  Patients at this level are likely to benefit from discharge to post-acute rehabilitation services  Please refer to the recommendation of the Occupational Therapist for safe discharge planning  Additional Comments 2 Pt seen as a co-evaluation due to the patient's co-morbidities, clinically unstable presentation, and present impairments which are a regression from the patient's baseline   AM-[facility] Daily Activity Inpatient   Lower Body Dressing 2   Bathing 2   Toileting 2   Upper Body Dressing 3   Grooming 3   Eating 3   Daily Activity Raw Score 15   Daily Activity Standardized Score (Calc for Raw Score >=11) 34 69   AM-[facility] Applied Cognition Inpatient   Following a Speech/Presentation 1   Understanding Ordinary Conversation 2   Taking Medications 1   Remembering Where Things Are Placed or Put Away 1   Remembering List of 4-5 Errands 1   Taking Care of Complicated Tasks 1   Applied Cognition Raw Score 7   Applied Cognition Standardized Score 15 17   End of Consult   Education Provided Yes   Patient Position at End of Consult Bedside chair;Bed/Chair alarm activated; All needs within reach   Nurse Communication Nurse aware of consult     GOALS    1) Pt will improve activity tolerance to G for 30 min txment sessions for increase engagement in functional tasks  2) Pt will complete UB/LB dressing/self care w/ mod I using adaptive device and DME as needed  3) Pt will complete bathing w/ Mod I w/ use of AE and DME as needed  4) Pt will complete toileting w/ mod I w/ G hygiene/thoroughness using DME as needed  5) Pt will improve functional transfers to Mod I on/off all surfaces using DME as needed w/ G balance/safety   6) Pt will improve functional mobility during ADL/IADL/leisure tasks to Mod I using DME as needed w/ G balance/safety   7) Pt will be attentive 100% of the time during ongoing cognitive assessment w/ G participation to assist w/ safe d/c planning/recommendations  8) Pt will demonstrate G carryover of pt/caregiver education and training as appropriate w/o cues w/ good tolerance to increase safety during functional tasks  9) Pt will demonstrate 100% carryover of energy conservation techniques t/o functional I/ADL/leisure tasks w/o cues s/p skilled education to increase endurance during functional tasks  10) Pt will engage in ongoing  assessments, screens, and activities t/o fx'l I/ADL/leisure tasks w/ G participation to A w/ adaptation and accomodations or rule out visual perceptual impairments  11) Pt will follow 100% simple one step verbal commands and be A/Ox4 consistently t/o use of external environmental cues to increase awareness during functional tasks    Leonidas Heath MS, OTR/L

## 2022-12-02 NOTE — PROGRESS NOTES
Progress Note - Trauma ICU Transfer   and [address] 80 y o  female [phone]   Unit/Bed#: ICU 10 Encounter: [id number]     Assessment & Plan   Summary of Diagnosed Injuries:   -Rib fractures    PLAN:  Rib fracture protocol  CM for placement  Respiratory protocol  Wean nasal cannula for goal SpO2 > 92%  Check COVID given recent exposure per nephew    VTE Prophylaxis:Heparin     Disposition: SD2    Code status:  Level 3 - DNAR and DNI    Consultants: IP CONSULT TO CASE MANAGEMENT  IP CONSULT TO GERONTOLOGY     Subjective   Mechanism of Injury:Fall     HPI/Last 24 hour events: 80 y o  female with PMHx of HTN and HLD who presented to the ER today as a level B trauma alert after several falls at home  Unclear if neighbor or  called EMS, but patient was likely down for approximately 2 hours  She was found to have multiple left sided rib fractures and was hypertensive to the 200s in the trauma bay  Patient is being admitted to [facility] for pulmonary toilet in setting of multiple rib fractures  Reason for ICU admission: Rib fractures    Summary of ICU clinical course: Patient was admitted for pulmonary toilet  She was confused overnight requiring one dose of zyprexa  She was placed on 2L this AM  She passed for a dysphagia diet  Team was made aware that she was exposed to [place] on Thanksgiving, swab pending    Recent or scheduled procedures: n/a    Outstanding/pending diagnostics: COVID/Flu/RSV swab       Objective   Vitals:   Temp:  [97 8 °F (36 6 °C)-99 1 °F (37 3 °C)] 98 4 °F (36 9 °C)  HR:  [] 88  Resp:  [10-28] 28  BP: (142-215)/(65-98) 148/67    I/O       11/30 0701  12/01 0700 12/01 0701  12/02 0700 12/02 0701  12/03 0700    P  O   120     I V  (mL/kg)  900 (15 9)     Total Intake(mL/kg)  1020 (18)     Urine (mL/kg/hr)  760     Stool  0     Total Output  760     Net  +260            Unmeasured Stool Occurrence  2 x            Physical Exam:   GENERAL APPEARANCE: NAD  NEURO: Alert to self, confused at baseline  HEENT: Abrasion to L eye/forehead, healing  L conjunctival hemorrhage  CV: RRR   LUNGS: Upper airway gurgling, improved with suctioning  GI: Soft, nt/nd  MSK: Multiple abrasions of extremities  SKIN: Warm and dry    Invasive Devices     Peripheral Intravenous Line  Duration           Peripheral IV 12/01/22 Left;Ventral (anterior) Forearm <1 day          Drain  Duration           External Urinary Catheter <1 day               Rationale for remaining devices: na/    1  Before the illness or injury that brought you to the Emergency, did you need someone to help you on a regular basis? 1=Yes   2  Since the illness or injury that brought you to the Emergency, have you needed more help than usual to take care of yourself? 1=Yes   3  Have you been hospitalized for one or more nights during the past 6 months (excluding a stay in the Emergency Department)? 1=Yes   4  In general, do you see well? 1=No   5  In general, do you have serious problems with your memory? 1=Yes   6  Do you take more than three different medications everyday?  0=No   TOTAL   5     Did you order a geriatric consult if the score was 2 or greater?: yes     PIC Score  PIC Pain Score: 3 (12/2/2022  8:00 AM)  PIC Incentive Spirometry Score: 2 (12/2/2022 12:00 AM)  PIC Cough Description: 3 (12/2/2022 12:00 AM)  PIC Total Score: 8 (12/2/2022 12:00 AM)       If the Total PIC Score </=5, did you consult APS and evaluate patient for further intervention?: no      Pain:    Incentive Spirometry  Cough  3 = Controlled  4 = Above goal volume 3 = Strong  2 = Moderate  3 = Goal to alert volume 2 = Weak  1 = Severe  2 = Below alert volume 1 = Absent     1 = Unable to perform IS      Lab Results:   BMP/CMP:   Lab Results   Component Value Date    SODIUM 141 12/02/2022    K 4 4 12/02/2022     (H) 12/02/2022    CO2 21 12/02/2022    BUN 16 12/02/2022    CREATININE 0 89 12/02/2022    CALCIUM 8 4 12/02/2022    AST 75 (H) 12/02/2022    ALT 36 12/02/2022    ALKPHOS 70 12/02/2022    EGFR 54 12/02/2022    and CBC:   Lab Results   Component Value Date    WBC 6 76 12/02/2022    HGB 15 3 12/02/2022    HCT 43 6 12/02/2022    MCV 95 12/02/2022     12/02/2022    MCH 33 3 12/02/2022    MCHC 35 1 12/02/2022    RDW 12 7 12/02/2022    MPV 9 1 12/02/2022    NRBC 0 12/02/2022       Imaging Results: I have personally reviewed pertinent reports  Chest Xray(s): negative for acute findings   FAST exam(s): negative for acute findings   CT Scan(s): positive for acute findings: rib fractures   Additional Xray(s): N/A       Code Status: Level 3 - DNAR and DNI       Patient seen and evaluated by Critical Care today and deemed to be appropriate for transfer to Stepdown Level 2  Spoke to [employer] from [facility] regarding transfer  Critical Care can be contacted via [employer] with any questions or concerns

## 2022-12-02 NOTE — PLAN OF CARE
Problem: OCCUPATIONAL THERAPY ADULT  Goal: Performs self-care activities at highest level of function for planned discharge setting  See evaluation for individualized goals  Description: Treatment Interventions: ADL retraining, Functional transfer training, Cognitive reorientation, Endurance training, UE strengthening/ROM, Patient/family training, Equipment evaluation/education, Compensatory technique education, Continued evaluation, Energy conservation, Activityengagement          See flowsheet documentation for full assessment, interventions and recommendations  Note: Limitation: Decreased ADL status, Decreased UE strength, Decreased UE ROM, Decreased Safe judgement during ADL, Decreased cognition, Decreased endurance, Decreased self-care trans, Decreased high-level ADLs  Prognosis: Fair  Assessment: Pt is a 79 y/o female seen for OT eval s/p adm to [facility] after a fall from standing height at home  Pt is dx'd w/  L sided rib fractures and scalp hematoma  Pt  has a past medical history of Anosmia, Cellulitis, Diverticulitis, colon, Fatigue (5/15/2017), Generalized osteoarthritis of multiple sites, Hyperlipidemia, Hypertension, Impacted cerumen of both ears, Impaired fasting glucose (7/4/2012), Lyme disease, Macular degeneration, and Tremor  Pt with active OT orders and up with assistance  orders  Pt lives with her spouse in 2 SH, 20-24 [address] to main level, 12 steps to 2nd floor and 12 steps down to basement; full bath in basement and on 2nd floor  Pt was I w/  ADLS and IADLS, does not drive, & required no use of DME PTA  Pt is currently demonstrating the following occupational deficits: Min A UB ADLS, Max A LB ADLS, Mod A bed mobility, transfers and functional mobility w/ HHA; SBA 2nd for safety   These deficits that are impacting pt's baseline areas of occupation are a result of the following impairments: pain, endurance, activity tolerance, functional mobility, forward functional reach, balance, trunk control, functional standing tolerance, unsupportive home environment, decreased I w/ ADLS/IADLS, strength, cognitive impairments, decreased safety awareness and decreased insight into deficits  The following Occupational Performance Areas to address include: eating, grooming, bathing/shower, toilet hygiene, dressing, medication management, socialization, health maintenance, functional mobility, community mobility, clothing management and household maintenance  Recommend inpatient rehab  upon D/C   Pt to continue to benefit from acute immediate OT services to address the following goals 2-4x/wk to  within the next 10-14 days:     OT Discharge Recommendation: Post acute rehabilitation services        Cornelius Roman MS, OTR/L

## 2022-12-02 NOTE — CASE MANAGEMENT
Case Management Assessment & Discharge Planning Note    Patient name Orquidea Sumner  Location ICU 10/ICU 10 MRN [id number]  : 3/7/1926 Date 2022       Current Admission Date: 2022  Current Admission Diagnosis:Fracture of multiple ribs of left side   Patient Active Problem List    Diagnosis Date Noted   • Fracture of multiple ribs of left side 2022   • Hypertension 2017   • Snores 05/15/2017   • Taste absent 2016   • Vitamin D deficiency 2015   • Benign colon polyp 2012   • Osteopenia 2012      LOS (days): 1  Geometric Mean LOS (GMLOS) (days): 2 50  Days to GMLOS:1 6     OBJECTIVE:    Risk of Unplanned Readmission Score: 11 76         Current admission status: Inpatient       Preferred Pharmacy:   [facility] - [address] [id number]  Phone: [phone] Fax: [phone]    Primary Care Provider: Konrad Reyes MD    Primary Insurance: MEDICARE  Secondary Insurance: AARP    ASSESSMENT:  Damari 26 Proxies    There are no active Health Care Proxies on file  Advance Directives  Does patient have a [address]?: Yes  Does patient have Advance Directives?: Yes  Advance Directives: Living will, Power of  for health care, Power of  for finance  Primary Contact: Alyse Sharma [phone] (nephew)    Readmission Root Cause  30 Day Readmission: No    Patient Information  Admitted from[de-identified] Home  Mental Status: Alert, Confused  During Assessment patient was accompanied by: Not accompanied during assessment  Assessment information provided by[de-identified] Other - please comment (Nephew)  Primary Caregiver: Self  Support Systems: Spouse/significant other, Friends/neighbors, Self, Family members  [place] of Residence: [address] do you live in?: [facility] entry access options   Select all that apply : Stairs  Number of steps to enter home : One Flight  Do the steps have railings?: Yes  Type of Current Residence: 2 story home  Upon entering residence, is there a bedroom on the main floor (no further steps)?: No  A bedroom is located on the following floor levels of residence (select all that apply):: 2nd Floor  Upon entering residence, is there a bathroom on the main floor (no further steps)?: No  Indicate which floors of current residence have a bathroom (select all the apply):: 2nd Floor  Number of steps to 2nd floor from main floor: One Flight  In the last 12 months, was there a time when you were not able to pay the mortgage or rent on time?: No  In the last 12 months, how many places have you lived?: 1  In the last 12 months, was there a time when you did not have a steady place to sleep or slept in a shelter (including now)?: No  Homeless/housing insecurity resource given?: N/A  Living Arrangements: Lives w/ Spouse/significant other  Is patient a ?: No    Activities of Daily Living Prior to Admission  Functional Status: Independent  Completes ADLs independently?: Yes  Ambulates independently?: Yes  Does patient use assisted devices?: No  Does patient currently own DME?: No  Does patient have a history of Outpatient Therapy (PT/OT)?: Yes  Does the patient have a history of Short-Term Rehab?: No  Does patient have a history of HHC?: No  Does patient currently have [facility]?: No    Patient Information Continued  Income Source: Pension/long term  Does patient have prescription coverage?: Yes  Within the past 12 months, you worried that your food would run out before you got the money to buy more : Never true  Within the past 12 months, the food you bought just didn't last and you didn't have money to get more : Never true  Food insecurity resource given?: N/A  Does patient receive dialysis treatments?: No  Does patient have a history of substance abuse?: No  Does patient have a history of Mental Health Diagnosis?: No    Means of Transportation  Means of Transport to Hospitals in [place][de-identified] Family transport  In the past 12 months, has lack of transportation kept you from medical appointments or from getting medications?: No  In the past 12 months, has lack of transportation kept you from meetings, work, or from getting things needed for daily living?: No  Was application for public transport provided?: N/A    DISCHARGE DETAILS:    Discharge planning discussed with[de-identified] patient's nephew  Freedom of Choice: Yes     CM contacted family/caregiver?: Yes  Were Treatment Team discharge recommendations reviewed with patient/caregiver?: Yes  Did patient/caregiver verbalize understanding of patient care needs?: Yes  Were patient/caregiver advised of the risks associated with not following Treatment Team discharge recommendations?: Yes    Contacts  Patient Contacts: Sivan Andrade [phone]  Relationship to Patient[de-identified] Family  Contact Method: Phone  Phone Number: [phone]  Reason/Outcome: Continuity of Care, Emergency Contact, Discharge Planning      CM spoke to pt's nephew Sivan Winchestershira, to discuss the role of CM  Pt lives with her  in a 2 story home that has 2 flights of stairs (20-24 steps) to get to the main level of the home or 1 flight of stairs (12 steps) to get into the basement  The basement level has a bedroom and bathroom (walk-in shower with standard toilet)  Bedroom and Bathroom (tub/shower with standard toilet) are also on the 2nd floor which has 12 steps to that level  Pt doesn't drive  Pt uses to work in a bank  Pt's nephew reports the pt was independent with their ADL/iADLs prior to admission  Pt's had 3 falls in the last 6 months  Pt ambulates independently and owns no DME  Pt enjoys crotchetting and knitting  Pt has a living will  Pt uses [employer]  Pt has no hx of mental health, substance abuse, IP rehab, or VNA  CM will appreciate therapy recommendations  Pt's family would like [facility], [facility], or [address] [place] [address] if SNF appropriate       Pt has COVID vaccinations:  Dose 1  03/04/2021 (COVID-19 PFIZER VACCINE 0 3 ML IM)      Dose 2  03/25/2021 (COVID-19 PFIZER VACCINE 0 3 ML IM)         CM reviewed d/c planning process including the following: identifying help at home, patient preference for d/c planning needs, Discharge Lounge, [facility] Meds to Bed program, availability of treatment team to discuss questions or concerns patient and/or family may have regarding understanding medications and recognizing signs and symptoms once discharged  CM also encouraged patient to follow up with all recommended appointments after discharge  Patient advised of importance for patient and family to participate in managing patient’s medical well being

## 2022-12-02 NOTE — PLAN OF CARE
Problem: PHYSICAL THERAPY ADULT  Goal: Performs mobility at highest level of function for planned discharge setting  See evaluation for individualized goals  Description: Treatment/Interventions: OT, Spoke to nursing, Gait training, Bed mobility, Patient/family training, Endurance training, LE strengthening/ROM, Functional transfer training  Equipment Recommended:  (TBD)       See flowsheet documentation for full assessment, interventions and recommendations  Note: Prognosis: Good  Problem List: Decreased strength, Decreased range of motion, Decreased endurance, Impaired balance, Decreased mobility, Decreased coordination, Decreased cognition, Impaired judgement, Decreased safety awareness, Pain  Assessment: Pt is 80 y o  female seen for PT evaluation s/p admit to [facility] on 12/1/2022 w/ Fracture of multiple ribs of left side  PT consulted to assess pt's functional mobility and d/c needs  Order placed for PT eval and tx, w/ up w/ A order  Comorbidities affecting pt's physical performance at time of assessment include:  has a past medical history of Anosmia, Cellulitis, Diverticulitis, colon, Fatigue, Generalized osteoarthritis of multiple sites, Hyperlipidemia, Hypertension, Impacted cerumen of both ears, Impaired fasting glucose, Lyme disease, Macular degeneration, and Tremor  PTA, pt was independent w/ all functional mobility w/ out use of DME per EMR, ambulates household distances, lives in multi-level home and retired  Personal factors affecting pt at time of IE include: inaccessible home environment, stairs to enter home, inability to ambulate household distances, decreased cognition, limited home support, positive fall history, decreased initiation and engagement, impulsivity, unable to perform physical activity, limited insight into impairments, inability to perform IADLs and inability to perform ADLs   Please find objective findings from PT assessment regarding body systems outlined above with impairments and limitations including weakness, impaired balance, decreased endurance, impaired coordination, gait deviations, pain, decreased activity tolerance, decreased functional mobility tolerance, decreased safety awareness, impaired judgement, fall risk, decreased skin integrity and decreased cognition  Pt inconsistently following single step commands with increased confusion  Required increased time and A to scoot EOB  Required A for transfers with deficits in standing balance  Ambulated with moderately unsteady shuffling gait  The following objective measures performed on IE also reveal limitations: The patient's AM-PAC Basic Mobility Inpatient Short Form Raw Score is 14, Standardized Score is 35 55  A standardized score less than 42 9 suggests the patient may benefit from discharge to post-acute rehabilitation services  Please also refer to the recommendation of the Physical Therapist for safe discharge planning  Pt's clinical presentation is currently unstable/unpredictable seen in pt's presentation of critical care monitoring  Barriers to Discharge: Inaccessible home environment, Decreased caregiver support     PT Discharge Recommendation: Post acute rehabilitation services    See flowsheet documentation for full assessment

## 2022-12-02 NOTE — RESPIRATORY THERAPY NOTE
RT Protocol Note  Rolo Dupree 80 y o  female MRN: [id number]  Unit/Bed#: ICU 10 Encounter: [id number]    Assessment    Principal Problem:    Fracture of multiple ribs of left side  Active Problems:    Hypertension      Home Pulmonary Medications:NA       Past Medical History:   Diagnosis Date    Anosmia     Cellulitis     Diverticulitis, colon     Fatigue 5/15/2017    Generalized osteoarthritis of multiple sites     Hyperlipidemia     Hypertension     Impacted cerumen of both ears     Impaired fasting glucose 7/4/2012    Lyme disease     Macular degeneration     Tremor      Social History     Socioeconomic History    Marital status: /Civil Union     Spouse name: None    Number of children: None    Years of education: None    Highest education level: None   Occupational History    None   Tobacco Use    Smoking status: Never    Smokeless tobacco: Never   Vaping Use    Vaping Use: Never used   Substance and Sexual Activity    Alcohol use: Yes     Alcohol/week: 5 0 standard drinks     Types: 5 Glasses of wine per week     Comment: social    Drug use: No    Sexual activity: None   Other Topics Concern    None   Social History Narrative    None     Social Determinants of Health     Financial Resource Strain: Not on file   Food Insecurity: No Food Insecurity    Worried About Running Out of Food in the Last Year: Never true    Ran Out of Food in the Last Year: Never true   Transportation Needs: No Transportation Needs    Lack of Transportation (Medical): No    Lack of Transportation (Non-Medical):  No   Physical Activity: Not on file   Stress: Not on file   Social Connections: Not on file   Intimate Partner Violence: Not on file   Housing Stability: Low Risk     Unable to Pay for Housing in the Last Year: No    Number of Places Lived in the Last Year: 1    Unstable Housing in the Last Year: No       Subjective         Objective    Physical Exam:   Assessment Type: (P) Assess only  General Appearance: (P) Awake  Respiratory Pattern: (P) Shallow  Chest Assessment: (P) Chest expansion symmetrical  Bilateral Breath Sounds: (P) Rhonchi, Clear  Cough: (P) Non-productive, Congested    Vitals:  Blood pressure 148/67, pulse 88, temperature 98 4 °F (36 9 °C), temperature source Oral, resp  rate (!) 28, height 4' 10" (1 473 m), weight 56 6 kg (124 lb 12 5 oz), SpO2 95 %, not currently breastfeeding  Imaging and other studies: I have personally reviewed pertinent reports  Plan    Respiratory Plan: (P) No distress/Pulmonary history  Airway Clearance Plan: Incentive Spirometer, Flutter     Resp Comments: (P) Pt assessed per resp protocol  Bs few rhonchi cleared after suctioning mouth orally  Will order pt on UDN q6 prn for wheezing  No rx needed at this time

## 2022-12-02 NOTE — PLAN OF CARE
Problem: Nutrition/Hydration-ADULT  Goal: Nutrient/Hydration intake appropriate for improving, restoring or maintaining nutritional needs  Description: Monitor and assess patient's nutrition/hydration status for malnutrition  Collaborate with interdisciplinary team and initiate plan and interventions as ordered  Monitor patient's weight and dietary intake as ordered or per policy  Utilize nutrition screening tool and intervene as necessary  Determine patient's food preferences and provide high-protein, high-caloric foods as appropriate       INTERVENTIONS:  - Monitor oral intake, urinary output, labs, and treatment plans  - Assess nutrition and hydration status and recommend course of action  - Evaluate amount of meals eaten  - Assist patient with eating if necessary   - Allow adequate time for meals  - Recommend/ encourage appropriate diets, oral nutritional supplements, and vitamin/mineral supplements  - Order, calculate, and assess calorie counts as needed  - Recommend, monitor, and adjust tube feedings and TPN/PPN based on assessed needs  - Assess need for intravenous fluids  - Provide specific nutrition/hydration education as appropriate  - Include patient/family/caregiver in decisions related to nutrition  Outcome: Progressing     Problem: SAFETY,RESTRAINT: NV/NON-SELF DESTRUCTIVE BEHAVIOR  Goal: Remains free of harm/injury (restraint for non violent/non self-detsructive behavior)  Description: INTERVENTIONS:  - Instruct patient/family regarding restraint use   - Assess and monitor physiologic and psychological status   - Provide interventions and comfort measures to meet assessed patient needs   - Identify and implement measures to help patient regain control  - Assess readiness for release of restraint   Outcome: Progressing  Goal: Returns to optimal restraint-free functioning  Description: INTERVENTIONS:  - Assess the patient's behavior and symptoms that indicate continued need for restraint  - Identify and implement measures to help patient regain control  - Assess readiness for release of restraint   Outcome: Progressing     Problem: DECISION MAKING  Goal: Pt/Family able to effectively weigh alternatives and participate in decision making related to treatment and care  Description: INTERVENTIONS:  - Identify decision maker  - Determine when there are differences among patient's view, family's view, and healthcare provider's view of patient condition and care goals  - Facilitate patient/family articulation of goals for care  - Help patient/family identify pros/cons of alternative solutions  - Provide information as requested by patient/family  - Respect patient/family rights related to privacy and sharing information   - Serve as a liaison between patient, family and health care team  - Initiate consults as appropriate (Ethics Team, Palliative Care, Family Care Conference, etc )  Outcome: Progressing     Problem: CONFUSION/THOUGHT DISTURBANCE  Goal: Thought disturbances (confusion, delirium, depression, dementia or psychosis) are managed to maintain or return to baseline mental status and functional level  Description: INTERVENTIONS:  - Assess for possible contributors to  thought disturbance, including but not limited to medications, infection, impaired vision or hearing, underlying metabolic abnormalities, dehydration, respiratory compromise,  psychiatric diagnoses and notify attending PHYSICAN/AP  - Monitor and intervene to maintain adequate nutrition, hydration, elimination, sleep and activity  - Decrease environmental stimuli, including noise as appropriate  - Provide frequent contacts to provide refocusing, direction and reassurance as needed  Approach patient calmly with eye contact and at their level    - [place] high risk fall precautions, aspiration precautions and other safety measures, as indicated  - If delirium suspected, notify physician/AP of change in condition and request immediate in-person evaluation  - Pursue consults as appropriate including Geriatric (campus dependent), OT for cognitive evaluation/activity planning, psychiatric, pastoral care, etc   Outcome: Progressing     Problem: BEHAVIOR  Goal: Pt/Family maintain appropriate behavior and adhere to behavioral management agreement, if implemented  Description: INTERVENTIONS:  - Assess the family dynamic   - Encourage verbalization of thoughts and concerns in a socially appropriate manner  - Assess patient/family's coping skills and non-compliant behavior (including use of illegal substances)  - Utilize positive, consistent limit setting strategies supporting safety of patient, staff and others  - Initiate consult with Case Management, Spiritual Care or other ancillary services as appropriate  - If a patient's/visitor's behavior jeopardizes the safety of the patient, staff, or others, refer to organization procedure     - Notify Security of behavior or suspected illegal substances which indicate the need for search of the patient and/or belongings  - Encourage participation in the decision making process about a behavioral management agreement; implement if patient meets criteria  Outcome: Progressing

## 2022-12-02 NOTE — PLAN OF CARE
Problem: Prexisting or High Potential for Compromised Skin Integrity  Goal: Skin integrity is maintained or improved  Description: INTERVENTIONS:  - Identify patients at risk for skin breakdown  - Assess and monitor skin integrity  - Assess and monitor nutrition and hydration status  - Monitor labs   - Assess for incontinence   - Turn and reposition patient  - Assist with mobility/ambulation  - Relieve pressure over bony prominences  - Avoid friction and shearing  - Provide appropriate hygiene as needed including keeping skin clean and dry  - Evaluate need for skin moisturizer/barrier cream  - Collaborate with interdisciplinary team   - Patient/family teaching  - Consider wound care consult   Outcome: Progressing     Problem: Potential for Falls  Goal: Patient will remain free of falls  Description: INTERVENTIONS:  - Educate patient/family on patient safety including physical limitations  - Instruct patient to call for assistance with activity   - Consult OT/PT to assist with strengthening/mobility   - Keep Call bell within reach  - Keep bed low and locked with side rails adjusted as appropriate  - Keep care items and personal belongings within reach  - Initiate and maintain comfort rounds  - Make Fall Risk Sign visible to staff  - Offer Toileting every 2 Hours, in advance of need  - Initiate/Maintain 2alarm  - Obtain necessary fall risk management equipment: 2  - Apply yellow socks and bracelet for high fall risk patients  - Consider moving patient to room near nurses station  Outcome: Progressing     Problem: MOBILITY - ADULT  Goal: Maintain or return to baseline ADL function  Description: INTERVENTIONS:  -  Assess patient's ability to carry out ADLs; assess patient's baseline for ADL function and identify physical deficits which impact ability to perform ADLs (bathing, care of mouth/teeth, toileting, grooming, dressing, etc )  - Assess/evaluate cause of self-care deficits   - Assess range of motion  - Assess patient's mobility; develop plan if impaired  - Assess patient's need for assistive devices and provide as appropriate  - Encourage maximum independence but intervene and supervise when necessary  - Involve family in performance of ADLs  - Assess for home care needs following discharge   - Consider OT consult to assist with ADL evaluation and planning for discharge  - Provide patient education as appropriate  Outcome: Progressing  Goal: Maintains/Returns to pre admission functional level  Description: INTERVENTIONS:  - Perform BMAT or MOVE assessment daily    - Set and communicate daily mobility goal to care team and patient/family/caregiver  - Collaborate with rehabilitation services on mobility goals if consulted  - Perform Range of Motion 2 times a day  - Reposition patient every 2 hours    - Dangle patient 2 times a day  - Stand patient 2 times a day  - Ambulate patient 2 times a day  - Out of bed to chair 2 times a day   - Out of bed for meals 2 times a day  - Out of bed for toileting  - Record patient progress and toleration of activity level   Outcome: Progressing     Problem: PAIN - ADULT  Goal: Verbalizes/displays adequate comfort level or baseline comfort level  Description: Interventions:  - Encourage patient to monitor pain and request assistance  - Assess pain using appropriate pain scale  - Administer analgesics based on type and severity of pain and evaluate response  - Implement non-pharmacological measures as appropriate and evaluate response  - Consider cultural and social influences on pain and pain management  - Notify physician/advanced practitioner if interventions unsuccessful or patient reports new pain  Outcome: Progressing     Problem: INFECTION - ADULT  Goal: Absence or prevention of progression during hospitalization  Description: INTERVENTIONS:  - Assess and monitor for signs and symptoms of infection  - Monitor lab/diagnostic results  - Monitor all insertion sites, i e  indwelling lines, tubes, and drains  - Monitor endotracheal if appropriate and nasal secretions for changes in amount and color  - [place] appropriate cooling/warming therapies per order  - Administer medications as ordered  - Instruct and encourage patient and family to use good hand hygiene technique  - Identify and instruct in appropriate isolation precautions for identified infection/condition  Outcome: Progressing     Problem: SAFETY ADULT  Goal: Maintains/Returns to pre admission functional level  Description: INTERVENTIONS:  - Perform BMAT or MOVE assessment daily    - Set and communicate daily mobility goal to care team and patient/family/caregiver  - Collaborate with rehabilitation services on mobility goals if consulted  - Perform Range of Motion 2 times a day  - Reposition patient every 2 hours  - Dangle patient 2 times a day  - Stand patient 2 times a day  - Ambulate patient 2 times a day  - Out of bed to chair 2 times a day   - Out of bed for meals 2 times a day  - Out of bed for toileting  - Record patient progress and toleration of activity level   Outcome: Progressing     Problem: SAFETY ADULT  Goal: Maintains/Returns to pre admission functional level  Description: INTERVENTIONS:  - Perform BMAT or MOVE assessment daily    - Set and communicate daily mobility goal to care team and patient/family/caregiver  - Collaborate with rehabilitation services on mobility goals if consulted  - Perform Range of Motion 2 times a day  - Reposition patient every 2 hours    - Dangle patient 2 times a day  - Stand patient 2 times a day  - Ambulate patient 2 times a day  - Out of bed to chair 2 times a day   - Out of bed for meals 2 times a day  - Out of bed for toileting  - Record patient progress and toleration of activity level   Outcome: Progressing     Problem: DISCHARGE PLANNING  Goal: Discharge to home or other facility with appropriate resources  Description: INTERVENTIONS:  - Identify barriers to discharge w/patient and caregiver  - Arrange for needed discharge resources and transportation as appropriate  - Identify discharge learning needs (meds, wound care, etc )  - Arrange for interpretive services to assist at discharge as needed  - Refer to Case Management Department for coordinating discharge planning if the patient needs post-hospital services based on physician/advanced practitioner order or complex needs related to functional status, cognitive ability, or social support system  Outcome: Progressing     Problem: Knowledge Deficit  Goal: Patient/family/caregiver demonstrates understanding of disease process, treatment plan, medications, and discharge instructions  Description: Complete learning assessment and assess knowledge base  Interventions:  - Provide teaching at level of understanding  - Provide teaching via preferred learning methods  Outcome: Progressing     Problem: Nutrition/Hydration-ADULT  Goal: Nutrient/Hydration intake appropriate for improving, restoring or maintaining nutritional needs  Description: Monitor and assess patient's nutrition/hydration status for malnutrition  Collaborate with interdisciplinary team and initiate plan and interventions as ordered  Monitor patient's weight and dietary intake as ordered or per policy  Utilize nutrition screening tool and intervene as necessary  Determine patient's food preferences and provide high-protein, high-caloric foods as appropriate       INTERVENTIONS:  - Monitor oral intake, urinary output, labs, and treatment plans  - Assess nutrition and hydration status and recommend course of action  - Evaluate amount of meals eaten  - Assist patient with eating if necessary   - Allow adequate time for meals  - Recommend/ encourage appropriate diets, oral nutritional supplements, and vitamin/mineral supplements  - Order, calculate, and assess calorie counts as needed  - Recommend, monitor, and adjust tube feedings and TPN/PPN based on assessed needs  - Assess need for intravenous fluids  - Provide specific nutrition/hydration education as appropriate  - Include patient/family/caregiver in decisions related to nutrition  Outcome: Progressing

## 2022-12-02 NOTE — PLAN OF CARE
Problem: Prexisting or High Potential for Compromised Skin Integrity  Goal: Skin integrity is maintained or improved  Description: INTERVENTIONS:  - Identify patients at risk for skin breakdown  - Assess and monitor skin integrity  - Assess and monitor nutrition and hydration status  - Monitor labs   - Assess for incontinence   - Turn and reposition patient  - Assist with mobility/ambulation  - Relieve pressure over bony prominences  - Avoid friction and shearing  - Provide appropriate hygiene as needed including keeping skin clean and dry  - Evaluate need for skin moisturizer/barrier cream  - Collaborate with interdisciplinary team   - Patient/family teaching  - Consider wound care consult   Outcome: Progressing     Problem: Potential for Falls  Goal: Patient will remain free of falls  Description: INTERVENTIONS:  - Educate patient/family on patient safety including physical limitations  - Instruct patient to call for assistance with activity   - Consult OT/PT to assist with strengthening/mobility   - Keep Call bell within reach  - Keep bed low and locked with side rails adjusted as appropriate  - Keep care items and personal belongings within reach  - Initiate and maintain comfort rounds  - Make Fall Risk Sign visible to staff  - Offer Toileting every 2 Hours, in advance of need  - Initiate/Maintain bed alarm  - Obtain necessary fall risk management equipment  - Apply yellow socks and bracelet for high fall risk patients  - Consider moving patient to room near nurses station  Outcome: Progressing     Problem: MOBILITY - ADULT  Goal: Maintain or return to baseline ADL function  Description: INTERVENTIONS:  -  Assess patient's ability to carry out ADLs; assess patient's baseline for ADL function and identify physical deficits which impact ability to perform ADLs (bathing, care of mouth/teeth, toileting, grooming, dressing, etc )  - Assess/evaluate cause of self-care deficits   - Assess range of motion  - Assess patient's mobility; develop plan if impaired  - Assess patient's need for assistive devices and provide as appropriate  - Encourage maximum independence but intervene and supervise when necessary  - Involve family in performance of ADLs  - Assess for home care needs following discharge   - Consider OT consult to assist with ADL evaluation and planning for discharge  - Provide patient education as appropriate  Outcome: Progressing  Goal: Maintains/Returns to pre admission functional level  Description: INTERVENTIONS:  - Perform BMAT or MOVE assessment daily    - Set and communicate daily mobility goal to care team and patient/family/caregiver  - Collaborate with rehabilitation services on mobility goals if consulted  - Perform Range of Motion 3 times a day  - Reposition patient every 2 hours    - Dangle patient 1 times a day  - Stand patient 1 times a day  - Ambulate patient 1 times a day  - Out of bed to chair 1 times a day   - Out of bed for meals 1 times a day  - Out of bed for toileting  - Record patient progress and toleration of activity level   Outcome: Progressing     Problem: PAIN - ADULT  Goal: Verbalizes/displays adequate comfort level or baseline comfort level  Description: Interventions:  - Encourage patient to monitor pain and request assistance  - Assess pain using appropriate pain scale  - Administer analgesics based on type and severity of pain and evaluate response  - Implement non-pharmacological measures as appropriate and evaluate response  - Consider cultural and social influences on pain and pain management  - Notify physician/advanced practitioner if interventions unsuccessful or patient reports new pain  Outcome: Progressing     Problem: INFECTION - ADULT  Goal: Absence or prevention of progression during hospitalization  Description: INTERVENTIONS:  - Assess and monitor for signs and symptoms of infection  - Monitor lab/diagnostic results  - Monitor all insertion sites, i e  indwelling lines, tubes, and drains  - Monitor endotracheal if appropriate and nasal secretions for changes in amount and color  - [place] appropriate cooling/warming therapies per order  - Administer medications as ordered  - Instruct and encourage patient and family to use good hand hygiene technique  - Identify and instruct in appropriate isolation precautions for identified infection/condition  Outcome: Progressing     Problem: SAFETY ADULT  Goal: Maintains/Returns to pre admission functional level  Description: INTERVENTIONS:  - Perform BMAT or MOVE assessment daily    - Set and communicate daily mobility goal to care team and patient/family/caregiver  - Collaborate with rehabilitation services on mobility goals if consulted  - Perform Range of Motion 3 times a day  - Reposition patient every 2 hours    - Dangle patient 1 times a day  - Stand patient 1 times a day  - Ambulate patient 1 times a day  - Out of bed to chair 1 times a day   - Out of bed for meals 1 times a day  - Out of bed for toileting  - Record patient progress and toleration of activity level   Outcome: Progressing     Problem: DISCHARGE PLANNING  Goal: Discharge to home or other facility with appropriate resources  Description: INTERVENTIONS:  - Identify barriers to discharge w/patient and caregiver  - Arrange for needed discharge resources and transportation as appropriate  - Identify discharge learning needs (meds, wound care, etc )  - Arrange for interpretive services to assist at discharge as needed  - Refer to Case Management Department for coordinating discharge planning if the patient needs post-hospital services based on physician/advanced practitioner order or complex needs related to functional status, cognitive ability, or social support system  Outcome: Progressing     Problem: Knowledge Deficit  Goal: Patient/family/caregiver demonstrates understanding of disease process, treatment plan, medications, and discharge instructions  Description: Complete learning assessment and assess knowledge base  Interventions:  - Provide teaching at level of understanding  - Provide teaching via preferred learning methods  Outcome: Progressing     Problem: Nutrition/Hydration-ADULT  Goal: Nutrient/Hydration intake appropriate for improving, restoring or maintaining nutritional needs  Description: Monitor and assess patient's nutrition/hydration status for malnutrition  Collaborate with interdisciplinary team and initiate plan and interventions as ordered  Monitor patient's weight and dietary intake as ordered or per policy  Utilize nutrition screening tool and intervene as necessary  Determine patient's food preferences and provide high-protein, high-caloric foods as appropriate       INTERVENTIONS:  - Monitor oral intake, urinary output, labs, and treatment plans  - Assess nutrition and hydration status and recommend course of action  - Evaluate amount of meals eaten  - Assist patient with eating if necessary   - Allow adequate time for meals  - Recommend/ encourage appropriate diets, oral nutritional supplements, and vitamin/mineral supplements  - Order, calculate, and assess calorie counts as needed  - Recommend, monitor, and adjust tube feedings and TPN/PPN based on assessed needs  - Assess need for intravenous fluids  - Provide specific nutrition/hydration education as appropriate  - Include patient/family/caregiver in decisions related to nutrition  Outcome: Progressing

## 2022-12-02 NOTE — PROGRESS NOTES
Daily Progress Note - Critical Care   Yashira White 80 y o  female MRN: [id number]  Unit/Bed#: ICU 10 Encounter: [id number]        ----------------------------------------------------------------------------------------  HPI/24hr events: 80year old female admitted due to fall from standing with head strike  Injuries include L 3-7 rib fractures and scalp hematoma  Overnight, patient delirious  Received oral Zyprexa 5mg and melatonin  Patient also initiated on urinary retention protocol  Straight cathed once  ---------------------------------------------------------------------------------------  SUBJECTIVE  No physical complaints  Confused  Review of Systems   Respiratory: Negative for shortness of breath  Musculoskeletal: Positive for myalgias  Denies rib pain    Hematological: Bruises/bleeds easily  Psychiatric/Behavioral: Positive for confusion       Review of systems was reviewed and negative unless stated above in HPI/24-hour events   ---------------------------------------------------------------------------------------  Assessment and Plan:    Neuro:   • Diagnosis: Acute pain   o Plan:   - Continue current pain regimen   • Tylenol 975mg q8hr   • Lidocaine patch   • Oxy 2 5mg/5mg q4hr PRN   • Dilaudid 0 2mg q4hr PRN   - Continue ice to area   - Continue bowel regimen   - Geriatrics following   • Diagnosis: Dementia   o Plan:   - At baseline forgetful and needing assistance with ADLs  - TSH and B12 pending   - Continue delirium precautions   - Zyprexa PRN   • Diagnosis: Acute encephalopathy   o Plan:   - Delirium precautions   - Regulate sleep/wake cycle  - CTH with no acute abnormality   • Diagnosis: Ambulatory dysfunction   o Plan:   - PT/OT   - Case management for post care placement       CV:   • Diagnosis: HTN   o Plan:   - SBP 200s on admission   - Continue:  • Home lisinopril   • hydralazine 5mg q6hr PRN    - SBP goal 100-160   - MAP goal >65   - Previous echo 12/2017: EF 60% with G1DD      Pulm:  • Diagnosis: At risk for PNA   o Plan:   - Continue aggressive pulm hygiene   - Pulm IS  - Follow up CXR as needed       GI:   • Diagnosis: No acute issues       :   • Diagnosis: Urinary retention   o Plan:   - Strict I/O   - Continue protocol       F/E/N:   • Plan:   o Fluids: Continue maintenance fluids   o Electrolytes: Will replete as necessary to maintain potassium > 4 0, magnesium > 2 0, and phosphrous > 3 0    o Nutrition: NPO, advance diet today       Heme/Onc:   • Diagnosis: No acute issues   o Plan:   - Continue Lovenox for DVT ppx       Endo:   • Diagnosis: No acute issues       ID:   • Diagnosis: No active source of infection   o Plan:   - Continue to trend WBC count and fever curve   - Continue to monitor off antibiotics   - No cultures to follow       MSK/Skin:   • Diagnosis: Rib fracture  o Plan:   - L 3-7 rib fracture  - Continue rib fracture protocol   - Continue current pain regimen   - Aggressive IS   - Geriatrics following       Patient appropriate for transfer out of the ICU today?: Patient meets criteria for referral to the ICU Follow-up Clinic; referral has been made  Disposition: Transfer to Stepdown Level 2  Code Status: Level 3 - DNAR and DNI  ---------------------------------------------------------------------------------------  ICU CORE MEASURES    Prophylaxis   VTE Pharmacologic Prophylaxis: Enoxaparin (Lovenox)  VTE Mechanical Prophylaxis: sequential compression device  Stress Ulcer Prophylaxis: Prophylaxis Not Indicated       Invasive Devices Review  Invasive Devices     Peripheral Intravenous Line  Duration           Peripheral IV 12/01/22 Left;Ventral (anterior) Forearm <1 day          Drain  Duration           External Urinary Catheter <1 day              Can any invasive devices be discontinued today?  Yes  ---------------------------------------------------------------------------------------  OBJECTIVE    Vitals   Vitals:    12/01/22 1803 12/01/22 2010 220   BP: 149/77 164/70 170/71 147/68   Pulse: 80 72 72 70   Resp: 19 16 17 17   Temp:       TempSrc:       SpO2: 99% 98%     Weight:       Height:         Temp (24hrs), Av 3 °F (36 8 °C), Min:97 8 °F (36 6 °C), Max:99 1 °F (37 3 °C)  Current: Temperature: 98 °F (36 7 °C)      Respiratory:  SpO2: SpO2: 99 %       Invasive/non-invasive ventilation settings   Respiratory    Lab Data (Last 4 hours)    None         O2/Vent Data (Last 4 hours)    None                Physical Exam  Constitutional:       General: She is in acute distress  Appearance: She is normal weight  She is not ill-appearing or toxic-appearing  HENT:      Head: Normocephalic  Nose: Nose normal       Mouth/Throat:      Mouth: Mucous membranes are moist       Pharynx: Oropharynx is clear  Pulmonary:      Effort: Pulmonary effort is normal  No respiratory distress  Musculoskeletal:         General: Normal range of motion  Cervical back: Normal range of motion and neck supple  No rigidity  Skin:     General: Skin is warm and dry  Coloration: Skin is pale  Findings: Bruising present  Neurological:      Mental Status: She is alert        Comments: Oriented to herself and situation; waxes and wanes; generalized weakness   Psychiatric:      Comments: Delirious          PIC Score  PIC Pain Score: 3 (2022  8:00 PM)  PIC Incentive Spirometry Score: 2 (2022  8:00 PM)  PIC Cough Description: 3 (2022  8:00 PM)  PIC Total Score: 8 (2022  8:00 PM)       If the Total PIC Score </=5, did you consult APS and evaluate patient for further intervention?: no      Pain:    Incentive Spirometry  Cough  3 = Controlled  4 = Above goal volume 3 = Strong  2 = Moderate  3 = Goal to alert volume 2 = Weak  1 = Severe  2 = Below alert volume 1 = Absent     1 = Unable to perform IS        Laboratory and Diagnostics:  Results from last 7 days   Lab Units 22  1145 22  1137   WBC Thousand/uL 5 56  --    HEMOGLOBIN g/dL 15 1  --    I STAT HEMOGLOBIN g/dl  --  14 3   HEMATOCRIT % 44 5  --    HEMATOCRIT, ISTAT %  --  42   PLATELETS Thousands/uL 227  --    NEUTROS PCT % 81*  --    MONOS PCT % 11  --      Results from last 7 days   Lab Units 12/01/22  1145 12/01/22  1137   SODIUM mmol/L 140  --    POTASSIUM mmol/L 3 5  --    CHLORIDE mmol/L 108  --    CO2 mmol/L 23  --    CO2, I-STAT mmol/L  --  25   ANION GAP mmol/L 9  --    BUN mg/dL 16  --    CREATININE mg/dL 0 87  --    CALCIUM mg/dL 9 0  --    GLUCOSE RANDOM mg/dL 106  --    ALT U/L 33  --    AST U/L 53*  --    ALK PHOS U/L 81  --    ALBUMIN g/dL 3 3*  --    TOTAL BILIRUBIN mg/dL 1 43*  --           Results from last 7 days   Lab Units 12/01/22  1145   INR  0 88   PTT seconds 26              ABG:    VBG:          Micro        EKG: Reviewed       Imaging:  I have personally reviewed pertinent films in PACS    Intake and Output  I/O       11/30 0701  12/01 0700 12/01 0701  12/02 0700    P  O   120    I V  (mL/kg)  150 (2 7)    Total Intake(mL/kg)  270 (4 8)    Urine (mL/kg/hr)  285    Stool  0    Total Output  285    Net  -15          Unmeasured Stool Occurrence  1 x            Height and Weights   Height: 4' 10" (147 3 cm)     Body mass index is 26 08 kg/m²  Weight (last 2 days)     Date/Time Weight    12/01/22 1503 56 6 (124 78)    12/01/22 11:41:42 56 6 (124 78)            Nutrition       Diet Orders   (From admission, onward)             Start     Ordered    12/01/22 1338  Diet NPO  Diet effective now        References:    Nutrtion Support Algorithm Enteral vs  Parenteral   Question Answer Comment   Diet Type NPO    RD to adjust diet per protocol?  No        12/01/22 1340                    Active Medications  Scheduled Meds:  Current Facility-Administered Medications   Medication Dose Route Frequency Provider Last Rate   • acetaminophen  975 mg [facility] Santos López PA-C     • bimatoprost  1 drop Right Eye  Santos López [place] • enoxaparin  30 mg Subcutaneous Q24H Rafa Cox MD     • hydrALAZINE  5 mg Intravenous Q6H PRN Fadumo Rush PA-C     • lidocaine  1 patch Topical Daily Vicente De León     • multi-electrolyte  75 mL/hr Intravenous Continuous Fadumo Rush [place] 75 mL/hr (12/01/22 1600)   • multivitamin-minerals  1 tablet Oral Daily Fadumo Rush PA-C     • OLANZapine  5 mg Oral HS Rafa Cox MD     • oxyCODONE  2 5 mg Oral Q4H PRN Fadumo Rush PA-C       Continuous Infusions:  multi-electrolyte, 75 mL/hr, Last Rate: 75 mL/hr (12/01/22 1600)      PRN Meds:   hydrALAZINE, 5 mg, Q6H PRN  oxyCODONE, 2 5 mg, Q4H PRN        Allergies   No Known Allergies  ---------------------------------------------------------------------------------------  Advance Directive and Living Will:      Power of :    POLST:    ---------------------------------------------------------------------------------------  Care Time Delivered:   No Critical Care time spent     Yazan Guillen PA-C

## 2022-12-02 NOTE — CASE MANAGEMENT
Case Management Discharge Planning Note    Patient name Loc Gonzalez  Location [facility] 604/[facility] 883-91 MRN [id number]  : 3/7/1926 Date 2022       Current Admission Date: 2022  Current Admission Diagnosis:Fracture of multiple ribs of left side   Patient Active Problem List    Diagnosis Date Noted   • Fracture of multiple ribs of left side 2022   • Hypertension 2017   • Snores 05/15/2017   • Taste absent 2016   • Vitamin D deficiency 2015   • Benign colon polyp 2012   • Osteopenia 2012      LOS (days): 1  Geometric Mean LOS (GMLOS) (days): 2 50  Days to GMLOS:1 4     OBJECTIVE:  Risk of Unplanned Readmission Score: 13 38         Current admission status: Inpatient   Preferred Pharmacy:    [facility] [place] - [address]  Phone: [phone] Fax: [phone]    Primary Care Provider: Rubi Curiel MD    Primary Insurance: MEDICARE  Secondary Insurance: [facility]    DISCHARGE DETAILS:    Pt was seen by OT/PT and recommended for IP rehab   CM placed referrals at pt's family's preferences

## 2022-12-02 NOTE — PHYSICAL THERAPY NOTE
Physical Therapy Evaluation     Patient's Name: Jocelyn Bales    Admitting Diagnosis  Rib fracture [S22 39XA]  Unspecified multiple injuries, initial encounter [T07  XXXA]    Problem List  Patient Active Problem List   Diagnosis    Hypertension    Benign colon polyp    Osteopenia    Snores    Taste absent    Vitamin D deficiency    Fracture of multiple ribs of left side       Past Medical History  Past Medical History:   Diagnosis Date    Anosmia     Cellulitis     Diverticulitis, colon     Fatigue 5/15/2017    Generalized osteoarthritis of multiple sites     Hyperlipidemia     Hypertension     Impacted cerumen of both ears     Impaired fasting glucose 7/4/2012    Lyme disease     Macular degeneration     Tremor        Past Surgical History  Past Surgical History:   Procedure Laterality Date    BREAST BIOPSY      CATARACT EXTRACTION      HYSTERECTOMY            12/02/22 0938   PT Last Visit   PT Visit Date 12/02/22   Note Type   Note type Evaluation   Pain Assessment   Pain Assessment Tool 0-10   Pain Score No Pain   Restrictions/Precautions   Other Precautions Impulsive;Cognitive; Chair Alarm; Bed Alarm; Restraints;Multiple lines; Fall Risk;Pain;Telemetry   Home Living   Type of [address] [place] [address] Two level   Bathroom Shower/Tub   (has walk in in the basement, and tub on 2nd floor)   Bathroom Toilet Standard   Additional Comments Pt currently with confusion, information obtained from EMR   Prior Function   Level of [place] Independent with functional mobility   Lives With Spouse   Receives Help [facility] [place] in the last 6 months 1 to 4  (per EMR family reports 3)   Vocational Retired  ()   General   Family/Caregiver Present No   Cognition   Orientation Level Oriented to person   RLE Assessment   RLE Assessment   (grossly at least 3/5 with movement noted pain)   LLE Assessment   LLE Assessment   (grossly at least 3/5 with movement noted pain)   Coordination   Movements are Fluid and Coordinated 0   Coordination and Movement Description slow and guarded with increased pain   Bed Mobility   Supine to Sit 3  Moderate assistance   Additional items Assist x 1; Increased time required;LE management   Sit to Supine Unable to assess   Additional Comments Pt left resting in chair as requested, call bell in reach, chair alarm active   Transfers   Sit to Stand 3  Moderate assistance   Additional items Assist x 1; Increased time required  (additional person close for safety)   Stand to Sit 3  Moderate assistance   Additional items Assist x 1   Ambulation/Elevation   Gait pattern Excessively slow; Shuffling;Decreased foot clearance   Gait Assistance 3  Moderate assist   Additional items Assist x 1   Assistive Device Other (Comment)  (HHA)   Distance 4'   Stair Management Assistance Not tested   Balance   Static Sitting Fair -   Dynamic Sitting Poor +   Static Standing Poor   Dynamic Standing Poor   Ambulatory Poor   Endurance Deficit   Endurance Deficit Yes   Endurance Deficit Description limited by confusion, pain, and decreased strength   Activity Tolerance   Activity Tolerance Patient limited by fatigue;Patient limited by pain   Medical Staff Made Aware OT for D/C planning   Nurse Made Aware yes, nsg gave clearance to work with pt   Assessment   Prognosis Good   Problem List Decreased strength;Decreased range of motion;Decreased endurance; Impaired balance;Decreased mobility; Decreased coordination;Decreased cognition; Impaired judgement;Decreased safety awareness;Pain   Assessment Pt is 80 y o  female seen for PT evaluation s/p admit to [facility] on 12/1/2022 w/ Fracture of multiple ribs of left side  PT consulted to assess pt's functional mobility and d/c needs  Order placed for PT eval and tx, w/ up w/ A order   Comorbidities affecting pt's physical performance at time of assessment include:  has a past medical history of Anosmia, Cellulitis, Diverticulitis, colon, Fatigue, Generalized osteoarthritis of multiple sites, Hyperlipidemia, Hypertension, Impacted cerumen of both ears, Impaired fasting glucose, Lyme disease, Macular degeneration, and Tremor  PTA, pt was independent w/ all functional mobility w/ out use of DME per EMR, ambulates household distances, lives in multi-level home and retired  Personal factors affecting pt at time of IE include: inaccessible home environment, stairs to enter home, inability to ambulate household distances, decreased cognition, limited home support, positive fall history, decreased initiation and engagement, impulsivity, unable to perform physical activity, limited insight into impairments, inability to perform IADLs and inability to perform ADLs  Please find objective findings from PT assessment regarding body systems outlined above with impairments and limitations including weakness, impaired balance, decreased endurance, impaired coordination, gait deviations, pain, decreased activity tolerance, decreased functional mobility tolerance, decreased safety awareness, impaired judgement, fall risk, decreased skin integrity and decreased cognition  Pt inconsistently following single step commands with increased confusion  Required increased time and A to scoot EOB  Required A for transfers with deficits in standing balance  Ambulated with moderately unsteady shuffling gait  The following objective measures performed on IE also reveal limitations: The patient's AM-PAC Basic Mobility Inpatient Short Form Raw Score is 14, Standardized Score is 35 55  A standardized score less than 42 9 suggests the patient may benefit from discharge to post-acute rehabilitation services  Please also refer to the recommendation of the Physical Therapist for safe discharge planning  Pt's clinical presentation is currently unstable/unpredictable seen in pt's presentation of critical care monitoring   Pt to benefit from continued PT tx to address deficits as defined above and maximize level of functional independent mobility and consistency  From PT/mobility standpoint, recommendation at time of d/c would be post acute rehabilitation services pending progress in order to facilitate return to PLOF  Barriers to Discharge Inaccessible home environment;Decreased caregiver support   Goals   Patient Goals None stated   STG Expiration Date 12/14/22   Short Term Goal #1 1  Complete bed mobility and transfers I to decrease need for caregiver in home  2  Ambulate 300' I to complete household and community mobility without A  3  Improve dynamic balance to good to decrease need for UE support during ambulation  4  Be educated & demonstate 12 steps to be able to enter home without A  Plan   Treatment/Interventions OT; Spoke to nursing;Gait training;Bed mobility; Patient/family training; Endurance training;LE strengthening/ROM; Functional transfer training   PT Frequency 3-5x/wk   Recommendation   PT Discharge Recommendation Post acute rehabilitation services   Equipment Recommended   (TBD)   AM-PAC Basic Mobility Inpatient   Turning in Bed Without Bedrails 3   Lying on Back to Sitting on Edge of Flat Bed 3   Moving Bed to Chair 2   Standing Up From Chair 2   Walk in Room 2   Climb 3-5 Stairs 2   Basic Mobility Inpatient Raw Score 14   Basic Mobility Standardized Score 35 55   Highest Level Of Mobility   JH-HLM Goal 4: Move to chair/commode   JH-HLM Achieved 4: Move to chair/commode         Lois Day PT

## 2022-12-03 PROBLEM — U07.1 COVID: Status: ACTIVE | Noted: 2022-12-03

## 2022-12-03 PROBLEM — R26.2 AMBULATORY DYSFUNCTION: Status: ACTIVE | Noted: 2022-12-03

## 2022-12-03 PROBLEM — F03.90 DEMENTIA (HCC): Status: ACTIVE | Noted: 2022-12-03

## 2022-12-03 LAB
ANION GAP SERPL CALCULATED.3IONS-SCNC: 10 MMOL/L (ref 4–13)
BASOPHILS # BLD AUTO: 0.01 THOUSANDS/ÂΜL (ref 0–0.1)
BASOPHILS NFR BLD AUTO: 0 % (ref 0–1)
BUN SERPL-MCNC: 23 MG/DL (ref 5–25)
CALCIUM SERPL-MCNC: 8.3 MG/DL (ref 8.3–10.1)
CHLORIDE SERPL-SCNC: 114 MMOL/L (ref 96–108)
CO2 SERPL-SCNC: 21 MMOL/L (ref 21–32)
CREAT SERPL-MCNC: 1.06 MG/DL (ref 0.6–1.3)
EOSINOPHIL # BLD AUTO: 0 THOUSAND/ÂΜL (ref 0–0.61)
EOSINOPHIL NFR BLD AUTO: 0 % (ref 0–6)
ERYTHROCYTE [DISTWIDTH] IN BLOOD BY AUTOMATED COUNT: 13 % (ref 11.6–15.1)
GFR SERPL CREATININE-BSD FRML MDRD: 44 ML/MIN/1.73SQ M
GLUCOSE SERPL-MCNC: 97 MG/DL (ref 65–140)
HCT VFR BLD AUTO: 39.5 % (ref 34.8–46.1)
HGB BLD-MCNC: 13.5 G/DL (ref 11.5–15.4)
IMM GRANULOCYTES # BLD AUTO: 0.02 THOUSAND/UL (ref 0–0.2)
IMM GRANULOCYTES NFR BLD AUTO: 0 % (ref 0–2)
LYMPHOCYTES # BLD AUTO: 0.57 THOUSANDS/ÂΜL (ref 0.6–4.47)
LYMPHOCYTES NFR BLD AUTO: 8 % (ref 14–44)
MCH RBC QN AUTO: 33.5 PG (ref 26.8–34.3)
MCHC RBC AUTO-ENTMCNC: 34.2 G/DL (ref 31.4–37.4)
MCV RBC AUTO: 98 FL (ref 82–98)
MONOCYTES # BLD AUTO: 0.48 THOUSAND/ÂΜL (ref 0.17–1.22)
MONOCYTES NFR BLD AUTO: 7 % (ref 4–12)
NEUTROPHILS # BLD AUTO: 6.32 THOUSANDS/ÂΜL (ref 1.85–7.62)
NEUTS SEG NFR BLD AUTO: 85 % (ref 43–75)
NRBC BLD AUTO-RTO: 0 /100 WBCS
PLATELET # BLD AUTO: 231 THOUSANDS/UL (ref 149–390)
PMV BLD AUTO: 9 FL (ref 8.9–12.7)
POTASSIUM SERPL-SCNC: 3.5 MMOL/L (ref 3.5–5.3)
RBC # BLD AUTO: 4.03 MILLION/UL (ref 3.81–5.12)
SODIUM SERPL-SCNC: 145 MMOL/L (ref 135–147)
WBC # BLD AUTO: 7.4 THOUSAND/UL (ref 4.31–10.16)

## 2022-12-03 PROCEDURE — XW033E5 INTRODUCTION OF REMDESIVIR ANTI-INFECTIVE INTO PERIPHERAL VEIN, PERCUTANEOUS APPROACH, NEW TECHNOLOGY GROUP 5: ICD-10-PCS | Performed by: STUDENT IN AN ORGANIZED HEALTH CARE EDUCATION/TRAINING PROGRAM

## 2022-12-03 RX ORDER — OLANZAPINE 10 MG/1
2.5 INJECTION, POWDER, LYOPHILIZED, FOR SOLUTION INTRAMUSCULAR EVERY 6 HOURS PRN
Status: DISCONTINUED | OUTPATIENT
Start: 2022-12-03 | End: 2022-12-15 | Stop reason: HOSPADM

## 2022-12-03 RX ORDER — OLANZAPINE 2.5 MG/1
2.5 TABLET ORAL EVERY 6 HOURS PRN
Status: DISCONTINUED | OUTPATIENT
Start: 2022-12-03 | End: 2022-12-03

## 2022-12-03 RX ORDER — WATER 1000 ML/1000ML
INJECTION, SOLUTION INTRAVENOUS
Status: COMPLETED
Start: 2022-12-03 | End: 2022-12-03

## 2022-12-03 RX ORDER — ACETAMINOPHEN 650 MG/1
650 SUPPOSITORY RECTAL EVERY 4 HOURS PRN
Status: DISCONTINUED | OUTPATIENT
Start: 2022-12-03 | End: 2022-12-14

## 2022-12-03 RX ADMIN — SODIUM CHLORIDE, SODIUM GLUCONATE, SODIUM ACETATE, POTASSIUM CHLORIDE, MAGNESIUM CHLORIDE, SODIUM PHOSPHATE, DIBASIC, AND POTASSIUM PHOSPHATE 75 ML/HR: .53; .5; .37; .037; .03; .012; .00082 INJECTION, SOLUTION INTRAVENOUS at 00:05

## 2022-12-03 RX ADMIN — WATER 10 ML: 1 INJECTION INTRAMUSCULAR; INTRAVENOUS; SUBCUTANEOUS at 22:19

## 2022-12-03 RX ADMIN — HEPARIN SODIUM 5000 UNITS: 5000 INJECTION INTRAVENOUS; SUBCUTANEOUS at 22:20

## 2022-12-03 RX ADMIN — ACETAMINOPHEN 650 MG: 650 SUPPOSITORY RECTAL at 11:47

## 2022-12-03 RX ADMIN — HEPARIN SODIUM 5000 UNITS: 5000 INJECTION INTRAVENOUS; SUBCUTANEOUS at 05:37

## 2022-12-03 RX ADMIN — OLANZAPINE 2.5 MG: 10 INJECTION, POWDER, LYOPHILIZED, FOR SOLUTION INTRAMUSCULAR at 22:19

## 2022-12-03 RX ADMIN — SODIUM CHLORIDE, SODIUM GLUCONATE, SODIUM ACETATE, POTASSIUM CHLORIDE, MAGNESIUM CHLORIDE, SODIUM PHOSPHATE, DIBASIC, AND POTASSIUM PHOSPHATE 75 ML/HR: .53; .5; .37; .037; .03; .012; .00082 INJECTION, SOLUTION INTRAVENOUS at 10:32

## 2022-12-03 RX ADMIN — LIDOCAINE 5% 1 PATCH: 700 PATCH TOPICAL at 09:20

## 2022-12-03 RX ADMIN — BIMATOPROST 1 DROP: 0.1 SOLUTION/ DROPS OPHTHALMIC at 22:21

## 2022-12-03 RX ADMIN — LABETALOL HYDROCHLORIDE 10 MG: 5 INJECTION, SOLUTION INTRAVENOUS at 22:19

## 2022-12-03 RX ADMIN — HEPARIN SODIUM 5000 UNITS: 5000 INJECTION INTRAVENOUS; SUBCUTANEOUS at 14:01

## 2022-12-03 NOTE — RESPIRATORY THERAPY NOTE
12/03/22 0734   Respiratory Assessment   Assessment Type Pre-treatment   General Appearance Sleeping   Respiratory Pattern Normal   Chest Assessment Chest expansion symmetrical   Bilateral Breath Sounds Diminished   Resp Comments Pt COVID +, pt unable to perform flutter due to not understanding and confusion on how to use it   O2 Device NC   Additional Assessments   Pulse 88   Respirations 20   SpO2 99 %   Position Semi-Myers's

## 2022-12-03 NOTE — PLAN OF CARE
Problem: Prexisting or High Potential for Compromised Skin Integrity  Goal: Skin integrity is maintained or improved  Description: INTERVENTIONS:  - Identify patients at risk for skin breakdown  - Assess and monitor skin integrity  - Assess and monitor nutrition and hydration status  - Monitor labs   - Assess for incontinence   - Turn and reposition patient  - Assist with mobility/ambulation  - Relieve pressure over bony prominences  - Avoid friction and shearing  - Provide appropriate hygiene as needed including keeping skin clean and dry  - Evaluate need for skin moisturizer/barrier cream  - Collaborate with interdisciplinary team   - Patient/family teaching  - Consider wound care consult   Outcome: Progressing     Problem: Potential for Falls  Goal: Patient will remain free of falls  Description: INTERVENTIONS:  - Educate patient/family on patient safety including physical limitations  - Instruct patient to call for assistance with activity   - Consult OT/PT to assist with strengthening/mobility   - Keep Call bell within reach  - Keep bed low and locked with side rails adjusted as appropriate  - Keep care items and personal belongings within reach  - Initiate and maintain comfort rounds  - Make Fall Risk Sign visible to staff  - Offer Toileting everyHours, in advance of need  - Initiate/Maintain alarm  - Obtain necessary fall risk management equipment:   - Apply yellow socks and bracelet for high fall risk patients  - Consider moving patient to room near nurses station  Outcome: Progressing     Problem: MOBILITY - ADULT  Goal: Maintain or return to baseline ADL function  Description: INTERVENTIONS:  -  Assess patient's ability to carry out ADLs; assess patient's baseline for ADL function and identify physical deficits which impact ability to perform ADLs (bathing, care of mouth/teeth, toileting, grooming, dressing, etc )  - Assess/evaluate cause of self-care deficits   - Assess range of motion  - Assess patient's mobility; develop plan if impaired  - Assess patient's need for assistive devices and provide as appropriate  - Encourage maximum independence but intervene and supervise when necessary  - Involve family in performance of ADLs  - Assess for home care needs following discharge   - Consider OT consult to assist with ADL evaluation and planning for discharge  - Provide patient education as appropriate  Outcome: Progressing  Goal: Maintains/Returns to pre admission functional level  Description: INTERVENTIONS:  - Perform BMAT or MOVE assessment daily    - Set and communicate daily mobility goal to care team and patient/family/caregiver  - Collaborate with rehabilitation services on mobility goals if consulted  - Perform Range of Motion  times a day  - Reposition patient every  hours    - Dangle patient  times a day  - Stand patient times a day  - Ambulate patient  times a day  - Out of bed to chair  times a day   - Out of bed for meals  times a day  - Out of bed for toileting  - Record patient progress and toleration of activity level   Outcome: Progressing     Problem: PAIN - ADULT  Goal: Verbalizes/displays adequate comfort level or baseline comfort level  Description: Interventions:  - Encourage patient to monitor pain and request assistance  - Assess pain using appropriate pain scale  - Administer analgesics based on type and severity of pain and evaluate response  - Implement non-pharmacological measures as appropriate and evaluate response  - Consider cultural and social influences on pain and pain management  - Notify physician/advanced practitioner if interventions unsuccessful or patient reports new pain  Outcome: Progressing     Problem: INFECTION - ADULT  Goal: Absence or prevention of progression during hospitalization  Description: INTERVENTIONS:  - Assess and monitor for signs and symptoms of infection  - Monitor lab/diagnostic results  - Monitor all insertion sites, i e  indwelling lines, tubes, and drains  - Monitor endotracheal if appropriate and nasal secretions for changes in amount and color  - [place] appropriate cooling/warming therapies per order  - Administer medications as ordered  - Instruct and encourage patient and family to use good hand hygiene technique  - Identify and instruct in appropriate isolation precautions for identified infection/condition  Outcome: Progressing     Problem: SAFETY ADULT  Goal: Maintains/Returns to pre admission functional level  Description: INTERVENTIONS:  - Perform BMAT or MOVE assessment daily    - Set and communicate daily mobility goal to care team and patient/family/caregiver  - Collaborate with rehabilitation services on mobility goals if consulted  - Perform Range of Motion  times a day  - Reposition patient every hours    - Dangle patient  times a day  - Stand patient  times a day  - Ambulate patient  times a day  - Out of bed to chair  times a day   - Out of bed for meals times a day  - Out of bed for toileting  - Record patient progress and toleration of activity level   Outcome: Progressing     Problem: DISCHARGE PLANNING  Goal: Discharge to home or other facility with appropriate resources  Description: INTERVENTIONS:  - Identify barriers to discharge w/patient and caregiver  - Arrange for needed discharge resources and transportation as appropriate  - Identify discharge learning needs (meds, wound care, etc )  - Arrange for interpretive services to assist at discharge as needed  - Refer to Case Management Department for coordinating discharge planning if the patient needs post-hospital services based on physician/advanced practitioner order or complex needs related to functional status, cognitive ability, or social support system  Outcome: Progressing     Problem: Knowledge Deficit  Goal: Patient/family/caregiver demonstrates understanding of disease process, treatment plan, medications, and discharge instructions  Description: Complete learning assessment and assess knowledge base  Interventions:  - Provide teaching at level of understanding  - Provide teaching via preferred learning methods  Outcome: Progressing     Problem: Nutrition/Hydration-ADULT  Goal: Nutrient/Hydration intake appropriate for improving, restoring or maintaining nutritional needs  Description: Monitor and assess patient's nutrition/hydration status for malnutrition  Collaborate with interdisciplinary team and initiate plan and interventions as ordered  Monitor patient's weight and dietary intake as ordered or per policy  Utilize nutrition screening tool and intervene as necessary  Determine patient's food preferences and provide high-protein, high-caloric foods as appropriate       INTERVENTIONS:  - Monitor oral intake, urinary output, labs, and treatment plans  - Assess nutrition and hydration status and recommend course of action  - Evaluate amount of meals eaten  - Assist patient with eating if necessary   - Allow adequate time for meals  - Recommend/ encourage appropriate diets, oral nutritional supplements, and vitamin/mineral supplements  - Order, calculate, and assess calorie counts as needed  - Recommend, monitor, and adjust tube feedings and TPN/PPN based on assessed needs  - Assess need for intravenous fluids  - Provide specific nutrition/hydration education as appropriate  - Include patient/family/caregiver in decisions related to nutrition  Outcome: Progressing     Problem: SAFETY,RESTRAINT: NV/NON-SELF DESTRUCTIVE BEHAVIOR  Goal: Remains free of harm/injury (restraint for non violent/non self-detsructive behavior)  Description: INTERVENTIONS:  - Instruct patient/family regarding restraint use   - Assess and monitor physiologic and psychological status   - Provide interventions and comfort measures to meet assessed patient needs   - Identify and implement measures to help patient regain control  - Assess readiness for release of restraint   Outcome: Progressing  Goal: Returns to optimal restraint-free functioning  Description: INTERVENTIONS:  - Assess the patient's behavior and symptoms that indicate continued need for restraint  - Identify and implement measures to help patient regain control  - Assess readiness for release of restraint   Outcome: Progressing     Problem: DECISION MAKING  Goal: Pt/Family able to effectively weigh alternatives and participate in decision making related to treatment and care  Description: INTERVENTIONS:  - Identify decision maker  - Determine when there are differences among patient's view, family's view, and healthcare provider's view of patient condition and care goals  - Facilitate patient/family articulation of goals for care  - Help patient/family identify pros/cons of alternative solutions  - Provide information as requested by patient/family  - Respect patient/family rights related to privacy and sharing information   - Serve as a liaison between patient, family and health care team  - Initiate consults as appropriate (Ethics Team, Palliative Care, Family Care Conference, etc )  Outcome: Progressing     Problem: CONFUSION/THOUGHT DISTURBANCE  Goal: Thought disturbances (confusion, delirium, depression, dementia or psychosis) are managed to maintain or return to baseline mental status and functional level  Description: INTERVENTIONS:  - Assess for possible contributors to  thought disturbance, including but not limited to medications, infection, impaired vision or hearing, underlying metabolic abnormalities, dehydration, respiratory compromise,  psychiatric diagnoses and notify attending PHYSICAN/AP  - Monitor and intervene to maintain adequate nutrition, hydration, elimination, sleep and activity  - Decrease environmental stimuli, including noise as appropriate  - Provide frequent contacts to provide refocusing, direction and reassurance as needed  Approach patient calmly with eye contact and at their level    - [place] high risk fall precautions, aspiration precautions and other safety measures, as indicated  - If delirium suspected, notify physician/AP of change in condition and request immediate in-person evaluation  - Pursue consults as appropriate including Geriatric (campus dependent), OT for cognitive evaluation/activity planning, psychiatric, pastoral care, etc   Outcome: Progressing     Problem: BEHAVIOR  Goal: Pt/Family maintain appropriate behavior and adhere to behavioral management agreement, if implemented  Description: INTERVENTIONS:  - Assess the family dynamic   - Encourage verbalization of thoughts and concerns in a socially appropriate manner  - Assess patient/family's coping skills and non-compliant behavior (including use of illegal substances)  - Utilize positive, consistent limit setting strategies supporting safety of patient, staff and others  - Initiate consult with Case Management, Spiritual Care or other ancillary services as appropriate  - If a patient's/visitor's behavior jeopardizes the safety of the patient, staff, or others, refer to organization procedure     - Notify Security of behavior or suspected illegal substances which indicate the need for search of the patient and/or belongings  - Encourage participation in the decision making process about a behavioral management agreement; implement if patient meets criteria  Outcome: Progressing

## 2022-12-03 NOTE — PROGRESS NOTES
[facility]  Progress Note - Mary Shorten 3/7/1926, 80 y o  female MRN: [id number]  Unit/Bed#: [facility] 604-01 Encounter: [id number]  Primary Care Provider: Sri Marcelino MD   Date and time admitted to hospital: 12/1/2022 11:28 AM    COVID  Assessment & Plan  O2 as needed  Isolation  Pulmonary toilet    Dementia [facility])  Assessment & Plan  Geriatrics consult - recs appreciated  Restraints secondary to impulsivity  Re- Directable    Ambulatory dysfunction  Assessment & Plan  PT/OT seen patient  REhab is recommended    * Fracture of multiple ribs of left side  Assessment & Plan  Rib Fracture Protocol  Pain Control  Incentive spirometer        Bowel Regimen: senna    VTE Prophylaxis:Sequential compression device (Venodyne)  and Heparin     Disposition: rehab    Subjective   Chief Complaint: wants to go home    Subjective: "I'm Lorene"     Objective   Vitals:   Temp:  [97 8 °F (36 6 °C)-101 °F (38 3 °C)] 98 7 °F (37 1 °C)  HR:  [] 97  Resp:  [16-22] 20  BP: (102-168)/(60-92) 102/68    I/O       12/01 0701  12/02 0700 12/02 0701  12/03 0700 12/03 0701  12/04 0700    P  O  120  0    I V  (mL/kg) 900 (15 9) 2015 (35 6)     Total Intake(mL/kg) 1020 (18) 2015 (35 6) 0 (0)    Urine (mL/kg/hr) 760 120 (0 1)     Stool 0      Total Output 760 120     Net +260 +1895 0           Unmeasured Urine Occurrence  3 x 1 x    Unmeasured Stool Occurrence 2 x 1 x 1 x           Physical Exam:   GENERAL APPEARANCE: awake and alert  NEURO: GCS 14 - 15  HEENT: EOm's intact  CV: RRR< no complaints of chest pain  LUNGS: CTA bilaterally, no shortness of breath, mouth dry  GI: bowel regimen  : voiding  MSK: moving extremities, very active  SKIN: warm and dry    Invasive Devices     Peripheral Intravenous Line  Duration           Peripheral IV 12/01/22 Left;Ventral (anterior) Forearm 1 day          Drain  Duration           External Urinary Catheter 1 day                 PIC Score  PIC Pain Score: 3 (12/3/2022 8:00 AM)  PIC Incentive Spirometry Score: 1 (12/3/2022  8:00 AM)  PIC Cough Description: 2 (12/3/2022  8:00 AM)  PIC Total Score: 6 (12/3/2022  8:00 AM)       If the Total PIC Score </=5, did you consult APS and evaluate patient for further intervention?: no      Pain:    Incentive Spirometry  Cough  3 = Controlled  4 = Above goal volume 3 = Strong  2 = Moderate  3 = Goal to alert volume 2 = Weak  1 = Severe  2 = Below alert volume 1 = Absent     1 = Unable to perform IS         Lab Results:    Latest Reference Range & Units 12/03/22 05:38   Sodium 135 - 147 mmol/L 145   Potassium 3 5 - 5 3 mmol/L 3 5   Chloride 96 - 108 mmol/L 114 (H)   CO2 21 - 32 mmol/L 21   Anion Gap 4 - 13 mmol/L 10   BUN 5 - 25 mg/dL 23   Creatinine 0 60 - 1 30 mg/dL 1 06   Glucose, Random 65 - 140 mg/dL 97   Calcium 8 3 - 10 1 mg/dL 8 3   eGFR ml/min/1 73sq m 44   (H): Data is abnormally high  Imaging: none  Other Studies: none

## 2022-12-04 RX ADMIN — HEPARIN SODIUM 5000 UNITS: 5000 INJECTION INTRAVENOUS; SUBCUTANEOUS at 15:00

## 2022-12-04 RX ADMIN — HEPARIN SODIUM 5000 UNITS: 5000 INJECTION INTRAVENOUS; SUBCUTANEOUS at 21:36

## 2022-12-04 RX ADMIN — BIMATOPROST 1 DROP: 0.1 SOLUTION/ DROPS OPHTHALMIC at 21:36

## 2022-12-04 RX ADMIN — HEPARIN SODIUM 5000 UNITS: 5000 INJECTION INTRAVENOUS; SUBCUTANEOUS at 05:15

## 2022-12-04 RX ADMIN — LIDOCAINE 5% 1 PATCH: 700 PATCH TOPICAL at 08:45

## 2022-12-04 NOTE — SPEECH THERAPY NOTE
Speech Language/Pathology    Speech/Language Pathology Progress Note    Patient Name: Marysol Shetty  [id number]'S Date: 12/4/2022     Problem List  Principal Problem:    Fracture of multiple ribs of left side  Active Problems:    Hypertension    Ambulatory dysfunction    Dementia ([address] )    COVID       Past Medical History  Past Medical History:   Diagnosis Date   • Anosmia    • Cellulitis    • Diverticulitis, colon    • Fatigue 5/15/2017   • Generalized osteoarthritis of multiple sites    • Hyperlipidemia    • Hypertension    • Impacted cerumen of both ears    • Impaired fasting glucose 7/4/2012   • Lyme disease    • Macular degeneration    • Tremor         Past Surgical History  Past Surgical History:   Procedure Laterality Date   • BREAST BIOPSY     • CATARACT EXTRACTION     • HYSTERECTOMY           Subjective:  Pt made NPO 2/2 concerns for increased gurgling and coughing w/ PO, increased O2 needs  Current Diet:  NPO    Objective:  Pt seen for dx dysphagia tx f/u  Upon arrival, pt w/ audible gurgling and thick, dry, bloody secretions visible in oropharynx  Copious thick secretions removed w/ aggressive oral care and suction  Upon removal of secretions, vocal quality improved - mild gurgling remains  Tsps of honey thick, nectar thick, and puree administered  Reduced labial seal w/ anterior spill of liquids  Oral manipulation mod-severe prolonged, delayed transfer  Min laryngeal rise to palpation  Noted cough response w/ several trials of nectar thick w/ associated gurgly vocal quality  Cued subsequent cough to improve vocal quality  Subsequent tsps of puree and honey thick without overt s/s aspiration, vocal quality improved w/ ongoing trials  Assessment:  Pt w/ s/s suggestive mod-severe oropharyngeal dysphagia w/ increased s/s aspiration today  Pt able to tolerate tsps puree and honey thick liquids today without overt s/s aspiration       Plan/Recommendations:  Puree w/ honey thick by tsp  Full supervision  FREQUENT/THOROUGH ORAL CARE 3-4x DAILY   ST to f/u as able and appropriate

## 2022-12-04 NOTE — PLAN OF CARE
Problem: Prexisting or High Potential for Compromised Skin Integrity  Goal: Skin integrity is maintained or improved  Description: INTERVENTIONS:  - Identify patients at risk for skin breakdown  - Assess and monitor skin integrity  - Assess and monitor nutrition and hydration status  - Monitor labs   - Assess for incontinence   - Turn and reposition patient  - Assist with mobility/ambulation  - Relieve pressure over bony prominences  - Avoid friction and shearing  - Provide appropriate hygiene as needed including keeping skin clean and dry  - Evaluate need for skin moisturizer/barrier cream  - Collaborate with interdisciplinary team   - Patient/family teaching  - Consider wound care consult   Outcome: Progressing     Problem: Potential for Falls  Goal: Patient will remain free of falls  Description: INTERVENTIONS:  - Educate patient/family on patient safety including physical limitations  - Instruct patient to call for assistance with activity   - Consult OT/PT to assist with strengthening/mobility   - Keep Call bell within reach  - Keep bed low and locked with side rails adjusted as appropriate  - Keep care items and personal belongings within reach  - Initiate and maintain comfort rounds  - Make Fall Risk Sign visible to staff  - Offer Toileting every two hours, in advance of need  - Initiate/Maintain bed alarm   - Obtain necessary fall risk management equipment  - Apply yellow socks and bracelet for high fall risk patients  - Consider moving patient to room near nurses station  Outcome: Progressing     Problem: PAIN - ADULT  Goal: Verbalizes/displays adequate comfort level or baseline comfort level  Description: Interventions:  - Encourage patient to monitor pain and request assistance  - Assess pain using appropriate pain scale  - Administer analgesics based on type and severity of pain and evaluate response  - Implement non-pharmacological measures as appropriate and evaluate response  - Consider cultural and social influences on pain and pain management  - Notify physician/advanced practitioner if interventions unsuccessful or patient reports new pain  Outcome: Progressing     Problem: INFECTION - ADULT  Goal: Absence or prevention of progression during hospitalization  Description: INTERVENTIONS:  - Assess and monitor for signs and symptoms of infection  - Monitor lab/diagnostic results  - Monitor all insertion sites, i e  indwelling lines, tubes, and drains  - [place] appropriate cooling/warming therapies per order  - Administer medications as ordered  - Instruct and encourage patient and family to use good hand hygiene technique  - Identify and instruct in appropriate isolation precautions for identified infection/condition  Outcome: Progressing     Problem: Nutrition/Hydration-ADULT  Goal: Nutrient/Hydration intake appropriate for improving, restoring or maintaining nutritional needs    INTERVENTIONS:  - Monitor oral intake, urinary output, labs, and treatment plans  - Assess nutrition and hydration status and recommend course of action  - Evaluate amount of meals eaten  - Assist patient with eating if necessary   - Allow adequate time for meals  - Recommend/ encourage appropriate diets, oral nutritional supplements, and vitamin/mineral supplements  - Order, calculate, and assess calorie counts as needed  - Recommend, monitor, and adjust tube feedings and TPN/PPN based on assessed needs  - Assess need for intravenous fluids  - Provide specific nutrition/hydration education as appropriate  - Include patient/family/caregiver in decisions related to nutrition  Outcome: Progressing     Problem: SAFETY,RESTRAINT: NV/NON-SELF DESTRUCTIVE BEHAVIOR  Goal: Remains free of harm/injury (restraint for non violent/non self-detsructive behavior)  Description: INTERVENTIONS:  - Instruct patient/family regarding restraint use   - Assess and monitor physiologic and psychological status   - Provide interventions and comfort measures to meet assessed patient needs   - Identify and implement measures to help patient regain control  - Assess readiness for release of restraint   Outcome: Progressing     Problem: CONFUSION/THOUGHT DISTURBANCE  Goal: Thought disturbances (confusion, delirium, depression, dementia or psychosis) are managed to maintain or return to baseline mental status and functional level  Description: INTERVENTIONS:  - Assess for possible contributors to  thought disturbance, including but not limited to medications, infection, impaired vision or hearing, underlying metabolic abnormalities, dehydration, respiratory compromise,  psychiatric diagnoses and notify attending PHYSICAN/AP  - Monitor and intervene to maintain adequate nutrition, hydration, elimination, sleep and activity  - Decrease environmental stimuli, including noise as appropriate  - Provide frequent contacts to provide refocusing, direction and reassurance as needed  Approach patient calmly with eye contact and at their level  - [place] high risk fall precautions, aspiration precautions and other safety measures, as indicated  - If delirium suspected, notify physician/AP of change in condition and request immediate in-person evaluation  - Pursue consults as appropriate including Geriatric (campus dependent), OT for cognitive evaluation/activity planning, psychiatric, pastoral care, etc   Outcome: Progressing     Problem: BEHAVIOR  Goal: Pt/Family maintain appropriate behavior and adhere to behavioral management agreement, if implemented  Description: INTERVENTIONS:  - Assess the family dynamic   - Encourage verbalization of thoughts and concerns in a socially appropriate manner  - Assess patient/family's coping skills and non-compliant behavior (including use of illegal substances)    - Utilize positive, consistent limit setting strategies supporting safety of patient, staff and others  - Initiate consult with Case Management, Spiritual Care or other ancillary services as appropriate  - If a patient's/visitor's behavior jeopardizes the safety of the patient, staff, or others, refer to organization procedure     - Notify Security of behavior or suspected illegal substances which indicate the need for search of the patient and/or belongings  - Encourage participation in the decision making process about a behavioral management agreement; implement if patient meets criteria  Outcome: Progressing

## 2022-12-04 NOTE — ASSESSMENT & PLAN NOTE
Rib Fracture Protocol  Pain Control  Incentive spirometer  Mouth breather and dries out quickly, oral care frequently

## 2022-12-04 NOTE — ASSESSMENT & PLAN NOTE
Geriatrics consult - recs appreciated  Restraints secondary to impulsivity  Re- Directable, follows commands

## 2022-12-04 NOTE — QUICK NOTE
Called to unit secondary to change in mental status  Upon entering room patient gurgling secretions in back of throat  Aggressively used mouth sponge to moisten mouth and some secretions  Respiratory in room and began suctioning copious white mucous  Patient much more responsive, asking us to stop, she doesn't want anymore  GCS at 14  Would suggest speaking with family tomorrow again regarding goals of care

## 2022-12-04 NOTE — OCCUPATIONAL THERAPY NOTE
Occupational Therapy Treatment Note:      12/04/22 1200   OT Last Visit   OT Visit Date 12/04/22   Note Type   Note Type Treatment   Pain Assessment   Pain Assessment Tool FLACC   Pain Rating: FLACC (Rest) - Face 0   Pain Rating: FLACC (Rest) - Legs 0   Pain Rating: FLACC (Rest) - Activity 0   Pain Rating: FLACC (Rest) - Cry 0   Pain Rating: FLACC (Rest) - Consolability 0   Score: FLACC (Rest) 0   Pain Rating: FLACC (Activity) - Face 0   Pain Rating: FLACC (Activity) - Legs 0   Pain Rating: FLACC (Activity) - Activity 0   Pain Rating: FLACC (Activity) - Cry 0   Pain Rating: FLACC (Activity) - Consolability 0   Score: FLACC (Activity) 0   ADL   Where Assessed Edge of bed   Cognition   Overall Cognitive Status Impaired   Arousal/Participation Alert; Cooperative   Attention Attends with cues to redirect   Orientation Level Disoriented to time  (pt did state year but was unable to id month / date)   Memory Decreased short term memory;Decreased recall of recent events;Decreased recall of precautions   Following Commands Follows one step commands without difficulty   Comments pt's safety and awareness is limited at this time  needing cues in order to use rw appropriately   Activity Tolerance   Activity Tolerance Patient tolerated treatment well   Assessment   Assessment pt participated in am ot session and was seen focusing on adl tasks/ toileting, bed mobility and functional transfers  pt is limited by congestion/ garbled mucusy voice, fatigue, inctontinence of urine in stance and decreased strength and balance  pts pusle ox remained in the 90's while she was on o2 via nasal canula  pt required mod  asst with rw and strong vc's inorder to step from bed to chair  pt was wearing waist restraint and b wrist restraints upon arrival  at end of session pt was left in waist restraint in chair per nsg request  rn requested wrist restraints be removed  pt required max asst to nate around back robe in sitting   pt was cooperative and motivated for oob  will follow focusing on goals from ie   Plan   Treatment Interventions ADL retraining;Functional transfer training; Endurance training;Patient/family training;Equipment evaluation/education   Goal Expiration Date 12/16/22   OT Treatment Day 1   OT Frequency Other (comment)  (2/4x/wk)   Recommendation   OT Discharge Recommendation Post acute rehabilitation services   AM-PAC Daily Activity Inpatient   Lower Body Dressing 2   Bathing 2   Toileting 1   Upper Body Dressing 3   Grooming 3   Eating 3   Daily Activity Raw Score 14   Daily Activity Standardized Score (Calc for Raw Score >=11) 33 39   AM-PAC Applied Cognition Inpatient   Following a Speech/Presentation 2   Understanding Ordinary Conversation 3   Taking Medications 2   Remembering Where Things Are Placed or Put Away 2   Remembering List of 4-5 Errands 1   Taking Care of Complicated Tasks 1   Applied Cognition Raw Score 11   Applied Cognition Standardized Score 27 03   April A Storm

## 2022-12-04 NOTE — PLAN OF CARE
Problem: OCCUPATIONAL THERAPY ADULT  Goal: Performs self-care activities at highest level of function for planned discharge setting  See evaluation for individualized goals  Description: Treatment Interventions: ADL retraining, Functional transfer training, Cognitive reorientation, Endurance training, UE strengthening/ROM, Patient/family training, Equipment evaluation/education, Compensatory technique education, Continued evaluation, Energy conservation, Activityengagement          See flowsheet documentation for full assessment, interventions and recommendations  Outcome: Progressing  Note: Limitation: Decreased ADL status, Decreased UE strength, Decreased UE ROM, Decreased Safe judgement during ADL, Decreased cognition, Decreased endurance, Decreased self-care trans, Decreased high-level ADLs  Prognosis: Fair  Assessment: pt participated in am ot session and was seen focusing on adl tasks/ toileting, bed mobility and functional transfers  pt is limited by congestion/ garbled mucusy voice, fatigue, inctontinence of urine in stance and decreased strength and balance  pts pusle ox remained in the 90's while she was on o2 via nasal canula  pt required mod  asst with rw and strong vc's inorder to step from bed to chair  pt was wearing waist restraint and b wrist restraints upon arrival  at end of session pt was left in waist restraint in chair per nsg request  rn requested wrist restraints be removed  pt required max asst to nate around back robe in sitting   pt was cooperative and motivated for oob  will follow focusing on goals from ie     OT Discharge Recommendation: Post acute rehabilitation services        April A Storm

## 2022-12-04 NOTE — PLAN OF CARE
Problem: Prexisting or High Potential for Compromised Skin Integrity  Goal: Skin integrity is maintained or improved  Description: INTERVENTIONS:  - Identify patients at risk for skin breakdown  - Assess and monitor skin integrity  - Assess and monitor nutrition and hydration status  - Monitor labs   - Assess for incontinence   - Turn and reposition patient  - Assist with mobility/ambulation  - Relieve pressure over bony prominences  - Avoid friction and shearing  - Provide appropriate hygiene as needed including keeping skin clean and dry  - Evaluate need for skin moisturizer/barrier cream  - Collaborate with interdisciplinary team   - Patient/family teaching  - Consider wound care consult   Outcome: Progressing     Problem: Potential for Falls  Goal: Patient will remain free of falls  Description: INTERVENTIONS:  - Educate patient/family on patient safety including physical limitations  - Instruct patient to call for assistance with activity   - Consult OT/PT to assist with strengthening/mobility   - Keep Call bell within reach  - Keep bed low and locked with side rails adjusted as appropriate  - Keep care items and personal belongings within reach  - Initiate and maintain comfort rounds  - Make Fall Risk Sign visible to staff  - Offer Toileting every 2 Hours, in advance of need  - Initiate/Maintain bed alarm  - Obtain necessary fall risk management equipment: fall bracelet  - Apply yellow socks and bracelet for high fall risk patients  - Consider moving patient to room near nurses station  Outcome: Progressing     Problem: MOBILITY - ADULT  Goal: Maintain or return to baseline ADL function  Description: INTERVENTIONS:  -  Assess patient's ability to carry out ADLs; assess patient's baseline for ADL function and identify physical deficits which impact ability to perform ADLs (bathing, care of mouth/teeth, toileting, grooming, dressing, etc )  - Assess/evaluate cause of self-care deficits   - Assess range of motion  - Assess patient's mobility; develop plan if impaired  - Assess patient's need for assistive devices and provide as appropriate  - Encourage maximum independence but intervene and supervise when necessary  - Involve family in performance of ADLs  - Assess for home care needs following discharge   - Consider OT consult to assist with ADL evaluation and planning for discharge  - Provide patient education as appropriate  Outcome: Progressing  Goal: Maintains/Returns to pre admission functional level  Description: INTERVENTIONS:  - Perform BMAT or MOVE assessment daily    - Set and communicate daily mobility goal to care team and patient/family/caregiver  - Collaborate with rehabilitation services on mobility goals if consulted  - Perform Range of Motion 3 times a day  - Reposition patient every 2 hours    - Dangle patient 3 times a day  - Stand patient  times a day  - Ambulate patient  times a day  - Out of bed to chair  times a day   - Out of bed for meals  times a day  - Out of bed for toileting  - Record patient progress and toleration of activity level   Outcome: Progressing     Problem: PAIN - ADULT  Goal: Verbalizes/displays adequate comfort level or baseline comfort level  Description: Interventions:  - Encourage patient to monitor pain and request assistance  - Assess pain using appropriate pain scale  - Administer analgesics based on type and severity of pain and evaluate response  - Implement non-pharmacological measures as appropriate and evaluate response  - Consider cultural and social influences on pain and pain management  - Notify physician/advanced practitioner if interventions unsuccessful or patient reports new pain  Outcome: Progressing     Problem: INFECTION - ADULT  Goal: Absence or prevention of progression during hospitalization  Description: INTERVENTIONS:  - Assess and monitor for signs and symptoms of infection  - Monitor lab/diagnostic results  - Monitor all insertion sites, i e  indwelling lines, tubes, and drains  - Monitor endotracheal if appropriate and nasal secretions for changes in amount and color  - [place] appropriate cooling/warming therapies per order  - Administer medications as ordered  - Instruct and encourage patient and family to use good hand hygiene technique  - Identify and instruct in appropriate isolation precautions for identified infection/condition  Outcome: Progressing     Problem: SAFETY ADULT  Goal: Maintains/Returns to pre admission functional level  Description: INTERVENTIONS:  - Perform BMAT or MOVE assessment daily    - Set and communicate daily mobility goal to care team and patient/family/caregiver  - Collaborate with rehabilitation services on mobility goals if consulted  - Perform Range of Motion 3 times a day  - Reposition patient every 2 hours    - Dangle patient 3 times a day  - Stand patient  times a day  - Ambulate patient  times a day  - Out of bed to chair  times a day   - Out of bed for meals  times a day  - Out of bed for toileting  - Record patient progress and toleration of activity level   Outcome: Progressing     Problem: DISCHARGE PLANNING  Goal: Discharge to home or other facility with appropriate resources  Description: INTERVENTIONS:  - Identify barriers to discharge w/patient and caregiver  - Arrange for needed discharge resources and transportation as appropriate  - Identify discharge learning needs (meds, wound care, etc )  - Arrange for interpretive services to assist at discharge as needed  - Refer to Case Management Department for coordinating discharge planning if the patient needs post-hospital services based on physician/advanced practitioner order or complex needs related to functional status, cognitive ability, or social support system  Outcome: Progressing     Problem: Knowledge Deficit  Goal: Patient/family/caregiver demonstrates understanding of disease process, treatment plan, medications, and discharge instructions  Description: Complete learning assessment and assess knowledge base  Interventions:  - Provide teaching at level of understanding  - Provide teaching via preferred learning methods  Outcome: Progressing     Problem: Nutrition/Hydration-ADULT  Goal: Nutrient/Hydration intake appropriate for improving, restoring or maintaining nutritional needs  Description: Monitor and assess patient's nutrition/hydration status for malnutrition  Collaborate with interdisciplinary team and initiate plan and interventions as ordered  Monitor patient's weight and dietary intake as ordered or per policy  Utilize nutrition screening tool and intervene as necessary  Determine patient's food preferences and provide high-protein, high-caloric foods as appropriate       INTERVENTIONS:  - Monitor oral intake, urinary output, labs, and treatment plans  - Assess nutrition and hydration status and recommend course of action  - Evaluate amount of meals eaten  - Assist patient with eating if necessary   - Allow adequate time for meals  - Recommend/ encourage appropriate diets, oral nutritional supplements, and vitamin/mineral supplements  - Order, calculate, and assess calorie counts as needed  - Recommend, monitor, and adjust tube feedings and TPN/PPN based on assessed needs  - Assess need for intravenous fluids  - Provide specific nutrition/hydration education as appropriate  - Include patient/family/caregiver in decisions related to nutrition  Outcome: Progressing     Problem: SAFETY,RESTRAINT: NV/NON-SELF DESTRUCTIVE BEHAVIOR  Goal: Remains free of harm/injury (restraint for non violent/non self-detsructive behavior)  Description: INTERVENTIONS:  - Instruct patient/family regarding restraint use   - Assess and monitor physiologic and psychological status   - Provide interventions and comfort measures to meet assessed patient needs   - Identify and implement measures to help patient regain control  - Assess readiness for release of restraint   Outcome: Progressing  Goal: Returns to optimal restraint-free functioning  Description: INTERVENTIONS:  - Assess the patient's behavior and symptoms that indicate continued need for restraint  - Identify and implement measures to help patient regain control  - Assess readiness for release of restraint   Outcome: Progressing     Problem: DECISION MAKING  Goal: Pt/Family able to effectively weigh alternatives and participate in decision making related to treatment and care  Description: INTERVENTIONS:  - Identify decision maker  - Determine when there are differences among patient's view, family's view, and healthcare provider's view of patient condition and care goals  - Facilitate patient/family articulation of goals for care  - Help patient/family identify pros/cons of alternative solutions  - Provide information as requested by patient/family  - Respect patient/family rights related to privacy and sharing information   - Serve as a liaison between patient, family and health care team  - Initiate consults as appropriate (Ethics Team, Palliative Care, Family Care Conference, etc )  Outcome: Progressing     Problem: CONFUSION/THOUGHT DISTURBANCE  Goal: Thought disturbances (confusion, delirium, depression, dementia or psychosis) are managed to maintain or return to baseline mental status and functional level  Description: INTERVENTIONS:  - Assess for possible contributors to  thought disturbance, including but not limited to medications, infection, impaired vision or hearing, underlying metabolic abnormalities, dehydration, respiratory compromise,  psychiatric diagnoses and notify attending PHYSICAN/AP  - Monitor and intervene to maintain adequate nutrition, hydration, elimination, sleep and activity  - Decrease environmental stimuli, including noise as appropriate  - Provide frequent contacts to provide refocusing, direction and reassurance as needed   Approach patient calmly with eye contact and at their level  - [place] high risk fall precautions, aspiration precautions and other safety measures, as indicated  - If delirium suspected, notify physician/AP of change in condition and request immediate in-person evaluation  - Pursue consults as appropriate including Geriatric (campus dependent), OT for cognitive evaluation/activity planning, psychiatric, pastoral care, etc   Outcome: Progressing     Problem: BEHAVIOR  Goal: Pt/Family maintain appropriate behavior and adhere to behavioral management agreement, if implemented  Description: INTERVENTIONS:  - Assess the family dynamic   - Encourage verbalization of thoughts and concerns in a socially appropriate manner  - Assess patient/family's coping skills and non-compliant behavior (including use of illegal substances)  - Utilize positive, consistent limit setting strategies supporting safety of patient, staff and others  - Initiate consult with Case Management, Spiritual Care or other ancillary services as appropriate  - If a patient's/visitor's behavior jeopardizes the safety of the patient, staff, or others, refer to organization procedure     - Notify Security of behavior or suspected illegal substances which indicate the need for search of the patient and/or belongings  - Encourage participation in the decision making process about a behavioral management agreement; implement if patient meets criteria  Outcome: Progressing

## 2022-12-04 NOTE — OCCUPATIONAL THERAPY NOTE
Occupational Therapy Treatment Note:      12/04/22 1200   OT Last Visit   OT Visit Date 12/04/22   Note Type   Note Type Treatment   Pain Assessment   Pain Assessment Tool FLACC   Pain Rating: FLACC (Rest) - Face 0   Pain Rating: FLACC (Rest) - Legs 0   Pain Rating: FLACC (Rest) - Activity 0   Pain Rating: FLACC (Rest) - Cry 0   Pain Rating: FLACC (Rest) - Consolability 0   Score: FLACC (Rest) 0   Pain Rating: FLACC (Activity) - Face 0   Pain Rating: FLACC (Activity) - Legs 0   Pain Rating: FLACC (Activity) - Activity 0   Pain Rating: FLACC (Activity) - Cry 0   Pain Rating: FLACC (Activity) - Consolability 0   Score: FLACC (Activity) 0   ADL   Where Assessed Edge of bed   Grooming Assistance 3  Moderate Assistance   LB Bathing Assistance 2  Maximal Assistance   LB Bathing Comments to nate doff b socks   Toileting Assistance  1  Total Assistance   Toileting Comments incont of urine in stance, ta to clean up / change socks   Functional Standing Tolerance   Time p+ balance with rw asst to pivot bed to chair with asst to steer rw and to guide body in direction of chair   Bed Mobility   Supine to Sit 2  Maximal assistance   Additional items Assist x 1   Transfers   Sit to Stand 2  Maximal assistance  (max asst x 1 c rw, asst to balance and steer)   Stand pivot 2  Maximal assistance   Additional items   (max asst for spt c rw)   Cognition   Overall Cognitive Status Impaired   Arousal/Participation Alert; Cooperative   Attention Attends with cues to redirect   Orientation Level Disoriented to time  (pt did state year but was unable to id month / date)   Memory Decreased short term memory;Decreased recall of recent events;Decreased recall of precautions   Following Commands Follows one step commands without difficulty   Comments pt's safety and awareness is limited at this time   needing cues in order to use rw appropriately   Activity Tolerance   Activity Tolerance Patient tolerated treatment well   Assessment   Assessment pt participated in am ot session and was seen focusing on adl tasks/ toileting, bed mobility and functional transfers  pt is limited by congestion/ garbled mucusy voice, fatigue, inctontinence of urine in stance and decreased strength and balance  pts pusle ox remained in the 90's while she was on o2 via nasal canula  pt required mod  asst with rw and strong vc's inorder to step from bed to chair  pt was wearing waist restraint and b wrist restraints upon arrival  at end of session pt was left in waist restraint in chair per nsg request  rn requested wrist restraints be removed  pt required max asst to nate around back robe in sitting  pt was cooperative and motivated for oob  will follow focusing on goals from ie   Plan   Treatment Interventions ADL retraining;Functional transfer training; Endurance training;Patient/family training;Equipment evaluation/education   Goal Expiration Date 12/16/22   OT Treatment Day 1   OT Frequency Other (comment)  (2/4x/wk)   Recommendation   OT Discharge Recommendation Post acute rehabilitation services   AM-PAC Daily Activity Inpatient   Lower Body Dressing 2   Bathing 2   Toileting 1   Upper Body Dressing 3   Grooming 3   Eating 3   Daily Activity Raw Score 14   Daily Activity Standardized Score (Calc for Raw Score >=11) 33 39   AM-PAC Applied Cognition Inpatient   Following a Speech/Presentation 2   Understanding Ordinary Conversation 3   Taking Medications 2   Remembering Where Things Are Placed or Put Away 2   Remembering List of 4-5 Errands 1   Taking Care of Complicated Tasks 1   Applied Cognition Raw Score 11   Applied Cognition Standardized Score 27 03   April A Storm

## 2022-12-05 ENCOUNTER — APPOINTMENT (INPATIENT)
Dept: RADIOLOGY | Facility: HOSPITAL | Age: 87
End: 2022-12-05

## 2022-12-05 LAB
ANION GAP SERPL CALCULATED.3IONS-SCNC: 11 MMOL/L (ref 4–13)
ANION GAP SERPL CALCULATED.3IONS-SCNC: 8 MMOL/L (ref 4–13)
ANISOCYTOSIS BLD QL SMEAR: PRESENT
BASOPHILS # BLD MANUAL: 0 THOUSAND/UL (ref 0–0.1)
BASOPHILS NFR MAR MANUAL: 0 % (ref 0–1)
BUN SERPL-MCNC: 33 MG/DL (ref 5–25)
BUN SERPL-MCNC: 36 MG/DL (ref 5–25)
CALCIUM SERPL-MCNC: 9 MG/DL (ref 8.3–10.1)
CALCIUM SERPL-MCNC: 9.2 MG/DL (ref 8.3–10.1)
CHLORIDE SERPL-SCNC: 118 MMOL/L (ref 96–108)
CHLORIDE SERPL-SCNC: 119 MMOL/L (ref 96–108)
CO2 SERPL-SCNC: 22 MMOL/L (ref 21–32)
CO2 SERPL-SCNC: 24 MMOL/L (ref 21–32)
CREAT SERPL-MCNC: 0.99 MG/DL (ref 0.6–1.3)
CREAT SERPL-MCNC: 1.14 MG/DL (ref 0.6–1.3)
DACRYOCYTES BLD QL SMEAR: PRESENT
EOSINOPHIL # BLD MANUAL: 0 THOUSAND/UL (ref 0–0.4)
EOSINOPHIL NFR BLD MANUAL: 0 % (ref 0–6)
ERYTHROCYTE [DISTWIDTH] IN BLOOD BY AUTOMATED COUNT: 13.2 % (ref 11.6–15.1)
GFR SERPL CREATININE-BSD FRML MDRD: 40 ML/MIN/1.73SQ M
GFR SERPL CREATININE-BSD FRML MDRD: 48 ML/MIN/1.73SQ M
GIANT PLATELETS BLD QL SMEAR: PRESENT
GLUCOSE SERPL-MCNC: 103 MG/DL (ref 65–140)
GLUCOSE SERPL-MCNC: 120 MG/DL (ref 65–140)
HCT VFR BLD AUTO: 41.4 % (ref 34.8–46.1)
HGB BLD-MCNC: 14.3 G/DL (ref 11.5–15.4)
LYMPHOCYTES # BLD AUTO: 0.47 THOUSAND/UL (ref 0.6–4.47)
LYMPHOCYTES # BLD AUTO: 6 % (ref 14–44)
MCH RBC QN AUTO: 34.3 PG (ref 26.8–34.3)
MCHC RBC AUTO-ENTMCNC: 34.5 G/DL (ref 31.4–37.4)
MCV RBC AUTO: 99 FL (ref 82–98)
MONOCYTES # BLD AUTO: 0.16 THOUSAND/UL (ref 0–1.22)
MONOCYTES NFR BLD: 2 % (ref 4–12)
NEUTROPHILS # BLD MANUAL: 6.95 THOUSAND/UL (ref 1.85–7.62)
NEUTS BAND NFR BLD MANUAL: 42 % (ref 0–8)
NEUTS SEG NFR BLD AUTO: 47 % (ref 43–75)
NRBC BLD AUTO-RTO: 1 /100 WBC (ref 0–2)
PLATELET # BLD AUTO: 255 THOUSANDS/UL (ref 149–390)
PLATELET BLD QL SMEAR: ADEQUATE
PMV BLD AUTO: 9.6 FL (ref 8.9–12.7)
POIKILOCYTOSIS BLD QL SMEAR: PRESENT
POTASSIUM SERPL-SCNC: 3.1 MMOL/L (ref 3.5–5.3)
POTASSIUM SERPL-SCNC: 3.7 MMOL/L (ref 3.5–5.3)
RBC # BLD AUTO: 4.17 MILLION/UL (ref 3.81–5.12)
RBC MORPH BLD: PRESENT
SODIUM SERPL-SCNC: 151 MMOL/L (ref 135–147)
SODIUM SERPL-SCNC: 151 MMOL/L (ref 135–147)
VARIANT LYMPHS # BLD AUTO: 3 %
WBC # BLD AUTO: 7.81 THOUSAND/UL (ref 4.31–10.16)

## 2022-12-05 RX ORDER — SODIUM CHLORIDE, SODIUM GLUCONATE, SODIUM ACETATE, POTASSIUM CHLORIDE, MAGNESIUM CHLORIDE, SODIUM PHOSPHATE, DIBASIC, AND POTASSIUM PHOSPHATE .53; .5; .37; .037; .03; .012; .00082 G/100ML; G/100ML; G/100ML; G/100ML; G/100ML; G/100ML; G/100ML
75 INJECTION, SOLUTION INTRAVENOUS CONTINUOUS
Status: DISCONTINUED | OUTPATIENT
Start: 2022-12-05 | End: 2022-12-05

## 2022-12-05 RX ORDER — ENOXAPARIN SODIUM 100 MG/ML
30 INJECTION SUBCUTANEOUS EVERY 12 HOURS SCHEDULED
Status: DISCONTINUED | OUTPATIENT
Start: 2022-12-05 | End: 2022-12-15 | Stop reason: HOSPADM

## 2022-12-05 RX ORDER — DOCUSATE SODIUM 100 MG/1
100 CAPSULE, LIQUID FILLED ORAL 2 TIMES DAILY
Status: DISCONTINUED | OUTPATIENT
Start: 2022-12-05 | End: 2022-12-15 | Stop reason: HOSPADM

## 2022-12-05 RX ORDER — SODIUM CHLORIDE, SODIUM GLUCONATE, SODIUM ACETATE, POTASSIUM CHLORIDE, MAGNESIUM CHLORIDE, SODIUM PHOSPHATE, DIBASIC, AND POTASSIUM PHOSPHATE .53; .5; .37; .037; .03; .012; .00082 G/100ML; G/100ML; G/100ML; G/100ML; G/100ML; G/100ML; G/100ML
50 INJECTION, SOLUTION INTRAVENOUS CONTINUOUS
Status: DISCONTINUED | OUTPATIENT
Start: 2022-12-05 | End: 2022-12-07

## 2022-12-05 RX ORDER — SENNOSIDES 8.6 MG
1 TABLET ORAL
Status: DISCONTINUED | OUTPATIENT
Start: 2022-12-05 | End: 2022-12-15 | Stop reason: HOSPADM

## 2022-12-05 RX ORDER — POTASSIUM CHLORIDE 20 MEQ/1
40 TABLET, EXTENDED RELEASE ORAL ONCE
Status: COMPLETED | OUTPATIENT
Start: 2022-12-05 | End: 2022-12-05

## 2022-12-05 RX ADMIN — AMLODIPINE BESYLATE 2.5 MG: 2.5 TABLET ORAL at 10:30

## 2022-12-05 RX ADMIN — ENOXAPARIN SODIUM 30 MG: 30 INJECTION SUBCUTANEOUS at 21:43

## 2022-12-05 RX ADMIN — ENOXAPARIN SODIUM 30 MG: 30 INJECTION SUBCUTANEOUS at 13:44

## 2022-12-05 RX ADMIN — BIMATOPROST 1 DROP: 0.1 SOLUTION/ DROPS OPHTHALMIC at 22:00

## 2022-12-05 RX ADMIN — LIDOCAINE 5% 1 PATCH: 700 PATCH TOPICAL at 08:07

## 2022-12-05 RX ADMIN — HEPARIN SODIUM 5000 UNITS: 5000 INJECTION INTRAVENOUS; SUBCUTANEOUS at 06:04

## 2022-12-05 RX ADMIN — OLANZAPINE 5 MG: 5 TABLET, ORALLY DISINTEGRATING ORAL at 21:43

## 2022-12-05 RX ADMIN — SENNOSIDES 8.6 MG: 8.6 TABLET, FILM COATED ORAL at 21:48

## 2022-12-05 RX ADMIN — SODIUM CHLORIDE, SODIUM GLUCONATE, SODIUM ACETATE, POTASSIUM CHLORIDE, MAGNESIUM CHLORIDE, SODIUM PHOSPHATE, DIBASIC, AND POTASSIUM PHOSPHATE 75 ML/HR: .53; .5; .37; .037; .03; .012; .00082 INJECTION, SOLUTION INTRAVENOUS at 10:10

## 2022-12-05 RX ADMIN — POTASSIUM CHLORIDE 40 MEQ: 1500 TABLET, EXTENDED RELEASE ORAL at 12:03

## 2022-12-05 NOTE — DISCHARGE INSTR - OTHER ORDERS
1  Apply hydraguard to b/l heels, buttocks, ischium, and sacrum BID and PRN for prevention and protection  2  Apply skin nourishing cream the entire skin daily for moisture  3  Turn and reposition patient every  2 hours   4  Elevate heels off of bed with pillows to offload pressure   5   Apply EHOB waffle cushion to chair when OOB, if able

## 2022-12-05 NOTE — ASSESSMENT & PLAN NOTE
- Geriatrics consult - recs appreciated  - Restraints secondary to impulsivity  - Re- Directable, follows commands  - no behavioral disturbances currently  - decrease in function since admission, cannot return to living at home per Geriatrics

## 2022-12-05 NOTE — QUICK NOTE
Spoke with patient's nephew Kranthi Landaverde regarding Palliative Care consult due to patient's inability to eat and need for goals of care discussion  Kranthi Reece expresses understanding and agrees to Palliative Care consult  Palliative Care consulted at this time

## 2022-12-05 NOTE — PROGRESS NOTES
[address] [facility]  Progress Note - Gabe Valero 3/7/1926, 80 y o  female MRN: [id number]  Unit/Bed#: [facility] 604-01 Encounter: [id number]  Primary Care Provider: Sky Lucero MD   Date and time admitted to hospital: 12/1/2022 11:28 AM    COVID  Assessment & Plan  - O2 as needed, currently requiring 6 L  Patient intermittently removes O2  Will continue restraints to ensure O2 status  Will wean as appropriate  Will add a humidifier  - Isolation  - Pulmonary toilet  - CXR today given rhonchorous breath sounds and tachycardic    Dementia ([facility] [address] )  Assessment & Plan  - Geriatrics consult - recs appreciated  - Restraints secondary to impulsivity  - Re- Directable, follows commands  - no behavioral disturbances currently  - decrease in function since admission, cannot return to living at home per Geriatrics    Ambulatory dysfunction  Assessment & Plan  - PT/OT seen patient  - Rehab is recommended    Hypertension  Assessment & Plan  -PO amlodipine  - IV hydralazine if parameters met    * Fracture of multiple ribs of left side  Assessment & Plan  - Rib Fracture Protocol   - Pain Control  - Incentive spirometer- difficult secondary to patient mental status  - Mouth breather and dries out quickly, oral care frequently        Bowel Regimen: Senna, Colace  VTE Prophylaxis:Heparin     Disposition: Rehab recommended  Goals of care discussion with family today  Subjective   Chief Complaint:  unable to be assessed due to underlying dementia    Subjective:  Subjective information limited due to underlying dementia  Patient is able to nod her head yes or no and is asking for water  Patient affirms that she is not in pain and is doing okay  Objective   Vitals:   Temp:  [96 9 °F (36 1 °C)-99 7 °F (37 6 °C)] 99 5 °F (37 5 °C)  HR:  [] 99  Resp:  [16-22] 22  BP: (144-171)/(57-85) 168/76    I/O       12/03 0701  12/04 0700 12/04 0701 12/05 0700 12/05 0701 12/06 0700    P  O  0      I V  (mL/kg) Total Intake(mL/kg) 0 (0)      Urine (mL/kg/hr)       Total Output       Net 0             Unmeasured Urine Occurrence 3 x 3 x     Unmeasured Stool Occurrence 3 x 1 x          Physical Exam:   GENERAL APPEARANCE:  Frail  Laying in bed watching tv  NEURO: GCS14 2/2 confusion  Light sensation in tact  No deficits  At baseline  HEENT: Normocephalic  Facial ecchymosis and swelling  L conjunctival hemorrhage  EOMS in tact  Cataract lenses  PEERLA  External ear normal  Oropharynx with poor dentition and darkening/dry mucous membranes  Neck supple and good ROM  CV: RRR no m/r/g  2+ radial and DP pulses  Unable to perform IS due to dementia  LUNGS: CTAB  Good expansion  GI: Abdomen soft, nontender, nondistended  : Voiding spontaneously, incontinent  Pelvis stable   MSK:  Full ROM and 5/5 strength of b/l UE and LE  SKIN: Warm, dry, in tact  No abrasions or pressure injuries  Invasive Devices     Peripheral Intravenous Line  Duration           Peripheral IV 12/01/22 Left;Ventral (anterior) Forearm 3 days          Drain  Duration           External Urinary Catheter 3 days                 PIC Score  PIC Pain Score: 3 (12/5/2022  4:00 AM)  PIC Incentive Spirometry Score: 1 (12/5/2022  4:00 AM)  PIC Cough Description: 2 (12/5/2022  4:00 AM)  PIC Total Score: 6 (12/5/2022  4:00 AM)       If the Total PIC Score </=5, did you consult APS and evaluate patient for further intervention?: n/a      Pain:    Incentive Spirometry  Cough  3 = Controlled  4 = Above goal volume 3 = Strong  2 = Moderate  3 = Goal to alert volume 2 = Weak  1 = Severe  2 = Below alert volume 1 = Absent     1 = Unable to perform IS         Lab Results: Results: I have personally reviewed all pertinent laboratory/tests results  Imaging: I have personally reviewed pertinent reports       Other Studies: N/A

## 2022-12-05 NOTE — WOUND OSTOMY CARE
Progress Note - Wound   Eva Steward 80 y o  female MRN: [id number]  Unit/Bed#: [facility] 604-01 Encounter: [id number]      Assessment:  Wound care consulted for assessment of sacral wound  Patient admitted with fracture of multiple L ribs s/p fall  History of dementia, fatigue, HTN, HLD, lyme's diease, and OA  Per H&P patient was down for approx 2 hours  Patient is also COVID-19 positive  Patient seen in bed, alert and oriented to self, dependent for care  Seen with the primary RN  Max assist of one with turning  Foam wedges are in use for repositioning  Incontinent of bowel and bladder  Patient is a high risk for developing more pressure injuries due to being found down  They may develop up to 5 days post admission  If they occur and clinically correlate, they too- will be POA  B/l heels are dry and intact without redness or wounds  1  L anterior knee is intact with well adhered dry scabbing noted  No redness, drainage or open aspects  2  B/l sacro-buttock is dry and intact with blanchable pink skin  No maceration noted in the sacro-intergluteal crease  Noted dry and intact sacro-coccygeal dimple  3  L ischium - DTI - suspect is likely related to patient being found down  Wound presents as irregular/linear shaped slow to isa fragile intact purple colored skin  No blistering of the skin or open aspects  Appears to be reabsorbing  Area is non-tender with palpation  Lisset-wound is intact with blanchable pink colored skin  No induration, fluctuance, odor, warmth/temperature differences, redness, or purulence noted to the above noted wounds and skin areas assessed  Patient tolerated assessment well- no s/s of non-verbal pain or discomfort observed during the encounter  Primary nurse aware of plan of care  See flow sheets for more detailed assessment findings  Will follow along  Plan:   1   Apply hydraguard to b/l heels, buttocks, ischium, and sacrum BID and PRN for prevention and protection  2  Apply skin nourishing cream the entire skin daily for moisture  3  Turn and reposition patient every  2 hours   4  Elevate heels off of bed with pillows to offload pressure   5  Apply EHOB waffle cushion to chair when OOB, if able      Objective:    Vitals: Blood pressure 160/76, pulse (!) 106, temperature 98 8 °F (37 1 °C), resp  rate 18, height 4' 10" (1 473 m), weight 56 6 kg (124 lb 12 5 oz), SpO2 (!) 88 %, not currently breastfeeding  ,Body mass index is 26 08 kg/m²  Wound 12/05/22 Pressure Injury Ischium Left (Active)   Wound Image   12/05/22 1217   Wound Description Intact; Light purple;Fragile;Dry 12/05/22 1217   Pressure Injury Stage DTPI 12/05/22 1217   Lisset-wound Assessment Dry; Intact; Pink 12/05/22 1217   Wound Length (cm) 7 cm 12/05/22 1217   Wound Width (cm) 4 cm 12/05/22 1217   Wound Depth (cm) 0 cm 12/05/22 1217   Wound Surface Area (cm^2) 28 cm^2 12/05/22 1217   Wound Volume (cm^3) 0 cm^3 12/05/22 1217   Calculated Wound Volume (cm^3) 0 cm^3 12/05/22 1217   Tunneling 0 cm 12/05/22 1217   Tunneling in depth located at 0 12/05/22 1217   Undermining 0 12/05/22 1217   Undermining is depth extending from 0 12/05/22 1217   Drainage Amount None 12/05/22 1217   Non-staged Wound Description Not applicable 12/62/97 9199   Treatments Cleansed 12/05/22 1217   Dressing Moisture barrier 12/05/22 1217   Wound packed? No 12/05/22 1217   Packing- # removed 0 12/05/22 1217   Packing- # inserted 0 12/05/22 1217   Patient Tolerance Tolerated well 12/05/22 1217       Tiger text with questions or concerns  Wound care will follow along weekly  AVS updated    Edwin Roberts RN MSN CWON

## 2022-12-05 NOTE — PHYSICAL THERAPY NOTE
Physical Therapy Progress Note     12/05/22 1155   PT Last Visit   PT Visit Date 12/05/22   Note Type   Note Type Treatment   Pain Assessment   Pain Assessment Tool FLACC   Pain Rating: FLACC (Rest) - Face 0   Pain Rating: FLACC (Rest) - Legs 0   Pain Rating: FLACC (Rest) - Activity 0   Pain Rating: FLACC (Rest) - Cry 0   Pain Rating: FLACC (Rest) - Consolability 0   Score: FLACC (Rest) 0   Pain Rating: FLACC (Activity) - Face 0   Pain Rating: FLACC (Activity) - Legs 0   Pain Rating: FLACC (Activity) - Activity 0   Pain Rating: FLACC (Activity) - Cry 0   Pain Rating: FLACC (Activity) - Consolability 0   Score: FLACC (Activity) 0   Restrictions/Precautions   Other Precautions Impulsive;Cognitive; Chair Alarm; Bed Alarm; Restraints  ([place] belt secured post session )   Subjective   Subjective The patient was minimally verbal    Bed Mobility   Supine to Sit 2  Maximal assistance   Additional items Assist x 1; Increased time required;Verbal cues;LE management   Sit to Supine 2  Maximal assistance   Additional items Assist x 1; Increased time required;Verbal cues;LE management   Transfers   Sit to Stand 2  Maximal assistance   Additional items Assist x 1; Increased time required;Verbal cues   Stand to Sit 2  Maximal assistance   Additional items Assist x 1; Increased time required;Verbal cues   Balance   Static Sitting Fair -   Dynamic Sitting Poor +   Static Standing Poor   Activity Tolerance   Activity Tolerance Patient limited by fatigue   Nurse Made Aware Yes  Exercises   THR Supine;Bilateral;AAROM;5 reps  (x2 sets )   Assessment   Prognosis Good   Problem List Decreased strength;Decreased range of motion;Decreased endurance; Impaired balance;Decreased mobility; Decreased coordination;Decreased cognition; Impaired judgement;Decreased safety awareness;Pain   Assessment The patient was alert, but she was minimally verbal  She continues to require significant assistance for mobility, and she fatigues quickly once she was seated  This further progressed with two standing trials  She was returned to bed and was in the cardiac chair position post session in order to attempt to help with her secretion management  Barriers to Discharge Inaccessible home environment;Decreased caregiver support   Goals   Patient Goals Pt  was minimally verbal    STG Expiration Date 12/14/22   PT Treatment Day 1   Plan   Treatment/Interventions Functional transfer training;LE strengthening/ROM; Therapeutic exercise; Endurance training;Patient/family training;Cognitive reorientation; Bed mobility;Elevations;Gait training   Progress Slow progress, decreased activity tolerance   PT Frequency 3-5x/wk   Recommendation   PT Discharge Recommendation Post acute rehabilitation services   AM-PAC Basic Mobility Inpatient   Turning in Bed Without Bedrails 3   Lying on Back to Sitting on Edge of Flat Bed 2   Moving Bed to Chair 2   Standing Up From Chair 2   Walk in Room 2   Climb 3-5 Stairs 1   Basic Mobility Inpatient Raw Score 12   Basic Mobility Standardized Score 32 23   Highest Level Of Mobility   -HLM Goal 4: Move to chair/commode   JH-HLM Achieved 5: Stand (1 or more minutes)       An AM-PAC Basic Mobility standardized score less than 40 78 suggests the patient may benefit from discharge to post-acute rehab services      Jennifer Mosley, PTA

## 2022-12-05 NOTE — PLAN OF CARE
Problem: Nutrition/Hydration-ADULT  Goal: Nutrient/Hydration intake appropriate for improving, restoring or maintaining nutritional needs  Description: Monitor and assess patient's nutrition/hydration status for malnutrition  Collaborate with interdisciplinary team and initiate plan and interventions as ordered  Monitor patient's weight and dietary intake as ordered or per policy  Utilize nutrition screening tool and intervene as necessary  Determine patient's food preferences and provide high-protein, high-caloric foods as appropriate       INTERVENTIONS:  - Monitor oral intake, urinary output, labs, and treatment plans  - Assess nutrition and hydration status and recommend course of action  - Evaluate amount of meals eaten  - Assist patient with eating if necessary   - Allow adequate time for meals  - Recommend/ encourage appropriate diets, oral nutritional supplements, and vitamin/mineral supplements  - Order, calculate, and assess calorie counts as needed  - Recommend, monitor, and adjust tube feedings and TPN/PPN based on assessed needs  - Assess need for intravenous fluids  - Provide specific nutrition/hydration education as appropriate  - Include patient/family/caregiver in decisions related to nutrition  Outcome: Progressing     Problem: SAFETY,RESTRAINT: NV/NON-SELF DESTRUCTIVE BEHAVIOR  Goal: Remains free of harm/injury (restraint for non violent/non self-detsructive behavior)  Description: INTERVENTIONS:  - Instruct patient/family regarding restraint use   - Assess and monitor physiologic and psychological status   - Provide interventions and comfort measures to meet assessed patient needs   - Identify and implement measures to help patient regain control  - Assess readiness for release of restraint   Outcome: Progressing  Goal: Returns to optimal restraint-free functioning  Description: INTERVENTIONS:  - Assess the patient's behavior and symptoms that indicate continued need for restraint  - Identify and implement measures to help patient regain control  - Assess readiness for release of restraint   Outcome: Progressing     Problem: DECISION MAKING  Goal: Pt/Family able to effectively weigh alternatives and participate in decision making related to treatment and care  Description: INTERVENTIONS:  - Identify decision maker  - Determine when there are differences among patient's view, family's view, and healthcare provider's view of patient condition and care goals  - Facilitate patient/family articulation of goals for care  - Help patient/family identify pros/cons of alternative solutions  - Provide information as requested by patient/family  - Respect patient/family rights related to privacy and sharing information   - Serve as a liaison between patient, family and health care team  - Initiate consults as appropriate (Ethics Team, Palliative Care, Family Care Conference, etc )  Outcome: Progressing     Problem: CONFUSION/THOUGHT DISTURBANCE  Goal: Thought disturbances (confusion, delirium, depression, dementia or psychosis) are managed to maintain or return to baseline mental status and functional level  Description: INTERVENTIONS:  - Assess for possible contributors to  thought disturbance, including but not limited to medications, infection, impaired vision or hearing, underlying metabolic abnormalities, dehydration, respiratory compromise,  psychiatric diagnoses and notify attending PHYSICAN/AP  - Monitor and intervene to maintain adequate nutrition, hydration, elimination, sleep and activity  - Decrease environmental stimuli, including noise as appropriate  - Provide frequent contacts to provide refocusing, direction and reassurance as needed  Approach patient calmly with eye contact and at their level    - [place] high risk fall precautions, aspiration precautions and other safety measures, as indicated  - If delirium suspected, notify physician/AP of change in condition and request immediate in-person evaluation  - Pursue consults as appropriate including Geriatric (campus dependent), OT for cognitive evaluation/activity planning, psychiatric, pastoral care, etc   Outcome: Progressing     Problem: BEHAVIOR  Goal: Pt/Family maintain appropriate behavior and adhere to behavioral management agreement, if implemented  Description: INTERVENTIONS:  - Assess the family dynamic   - Encourage verbalization of thoughts and concerns in a socially appropriate manner  - Assess patient/family's coping skills and non-compliant behavior (including use of illegal substances)  - Utilize positive, consistent limit setting strategies supporting safety of patient, staff and others  - Initiate consult with Case Management, Spiritual Care or other ancillary services as appropriate  - If a patient's/visitor's behavior jeopardizes the safety of the patient, staff, or others, refer to organization procedure     - Notify Security of behavior or suspected illegal substances which indicate the need for search of the patient and/or belongings  - Encourage participation in the decision making process about a behavioral management agreement; implement if patient meets criteria  Outcome: Progressing

## 2022-12-05 NOTE — PROGRESS NOTES
Progress Note - Geriatric Medicine   Wai Moreno 80 y o  female MRN: [id number]  Unit/Bed#: [facility] 604-01 Encounter: [id number]      Assessment/Plan:    Dementia without behavior disturbance  -moderate and progressive  -at baseline requires assistance with ADLs and IADLs  -initially in soft restraints on admit due to impulsivity, now off restraints but remains confused and requires near constant redirection  -high risk delirium/encephalopathy due to acute metabolic derangements superimposed on dementia, cont strict precautions     Acute COVID-19 infection  -positive by PCR 12/2/22  -exposure on Thanksgiving per family report  -continue supplemental O2 as needed to maintain saturations   -cont pulm toilet  -isolation per protocol  -all appropriate PPE utilized for duration of encounter     Goals of care  -discussed with pts nephew, Juancarlos Kim, who is emergency contact continue current treatments  -Bauer Judy believes pt has advance directives at her home which he would like to consult, he will look for documents and update care team before making any further goals of care decisions, all questions answered    Ambulatory dysfunction with fall  -recurrent falls at home PTA  -injuries as outlined below  -remains high risk recurrent falls, continue fall precautions assist with transfers  -maintain environment free of fall hazards  -PT and OT following, recommend rehab on discharge    Left-sided rib fractures (3 -7)  -s/p fall as outlined above baseline  -noted on CT chest admission  -followup chest x-ray reports questionable left effusion, no pneumothorax  -continue aggressive pulmonary toilet and ISS  -acute pain control    Hypertensive urgency  -continue optimize hemodynamics    Oropharyngeal dysphagia  -speech therapy recommend dysphagia 1 puree with nectar thick liquids  -continue aspiration precautions  -dose of poor oral intake with nephew and goals of care regarding poor oral intake, would like to consult pts advance directives to see if directions given and speak w pt  prior to making decision, he will f/u with care team once obtained  -continue goals of care discussions    Frailty syndrome in geriatric patient  -due to age and co-morbidities  -continue supportive cares, address acute metabolic derangements attempt optimize nutritional intake  -goals of care discussed with family, discussions ongoing     Care coordination: rounded with Trecia Gottron (RN) and Tony Borges (Trauma AP)    Subjective: Lorene is seen and examined at bedside where she is sitting resting, she is much more alert than last evaluation  She remains pleasantly confused and offers no acute complaints  She has been cleared by speech for oral intake with modified consistency but her appetite remains poor  I spoke with her nephew, Arianna Tillman, by phone to provide updates and discuss goals of care, her notes that the patients  is her decision maker but that he assists with conveying information as Trev  is extremely hard of hearing making it difficult for him to speak on the phone  I inquired about Trev wishes regarding nutritional support if oral intake does not improve and general goals of care, he thinks he recalls Lorene having advanced directives but does not have access to them at this time and would need to go to her home to review them which he would like to do as well as talk to [address]  prior to any further decision making regarding goals of care beyond those already discussed  Review of Systems   Unable to perform ROS: Dementia     Objective:     Vitals: Blood pressure 160/76, pulse (!) 106, temperature 98 8 °F (37 1 °C), resp  rate 18, height 4' 10" (1 473 m), weight 56 6 kg (124 lb 12 5 oz), SpO2 (!) 88 %, not currently breastfeeding  ,Body mass index is 26 08 kg/m²        Intake/Output Summary (Last 24 hours) at 12/5/2022 0047  Last data filed at 12/5/2022 1301  Gross per 24 hour   Intake 213 75 ml   Output --   Net 213 75 ml Current Medications: Reviewed    Physical Exam:   Physical Exam  Vitals and nursing note reviewed  Constitutional:       General: She is not in acute distress  Comments: Thin, frail ill-appearing female   HENT:      Head: Normocephalic  Comments: Left forehead with large scab     Nose: Nose normal       Mouth/Throat:      Mouth: Mucous membranes are dry  Comments: Dentition mostly intact but poor with chipped/fractured  teeth  Eyes:      General:         Right eye: No discharge  Left eye: No discharge  Comments: Left conjunctival hemorrhage   Neck:      Comments: Voice hoarse  Cardiovascular:      Rate and Rhythm: Normal rate  Pulses: Normal pulses  Pulmonary:      Effort: No respiratory distress  Comments: Shallow resp  Abdominal:      General: There is no distension  Palpations: Abdomen is soft  Tenderness: There is no abdominal tenderness  Musculoskeletal:      Cervical back: Neck supple  Right lower leg: No edema  Left lower leg: No edema  Comments: Diffuse severe subcutaneous fat and muscle wasting   Skin:     General: Skin is warm and dry  Comments: Thin and friable    Neurological:      Mental Status: She is alert  Comments: Awake and alert, oriented to self, pleasantly confused   Psychiatric:         Mood and Affect: Mood normal          Behavior: Behavior normal       Comments: Pleasantly confused, cooperative        Invasive Devices     Peripheral Intravenous Line  Duration           Peripheral IV 12/05/22 Left Forearm <1 day          Drain  Duration           External Urinary Catheter 4 days              Lab, Imaging and other studies: I have personally reviewed pertinent reports

## 2022-12-05 NOTE — ASSESSMENT & PLAN NOTE
- O2 as needed, currently requiring 6 L  Patient intermittently removes O2  Will continue restraints to ensure O2 status  Will wean as appropriate    Will add a humidifier  - Isolation  - Pulmonary toilet  - CXR today given rhonchorous breath sounds and tachycardic

## 2022-12-05 NOTE — SPEECH THERAPY NOTE
Speech Language/Pathology    Speech/Language Pathology Progress Note    Patient Name: Fior Valdez  AJOJD'T Date: 12/5/2022     Problem List  Principal Problem:    Fracture of multiple ribs of left side  Active Problems:    Hypertension    Ambulatory dysfunction    Dementia ([address] )    COVID       Past Medical History  Past Medical History:   Diagnosis Date   • Anosmia    • Cellulitis    • Diverticulitis, colon    • Fatigue 5/15/2017   • Generalized osteoarthritis of multiple sites    • Hyperlipidemia    • Hypertension    • Impacted cerumen of both ears    • Impaired fasting glucose 7/4/2012   • Lyme disease    • Macular degeneration    • Tremor         Past Surgical History  Past Surgical History:   Procedure Laterality Date   • BREAST BIOPSY     • CATARACT EXTRACTION     • HYSTERECTOMY           Subjective:  Pt awake, alert in bed  Needed repositioning  Current Diet: NPO, made NPO 2* pocketing  Objective:  Pt seen for dysphagia f/u  O2 92% to begin  Pt now covid+, continued w/ posey belt for safety  Poorly positioned in bed, needed mult repositioning  Oral care administered- pt w/ dry oral cavity but with wet respirations  Attempted to suction but unable to suction any mucous from the upper airway  Pt trialed w/ puree, ht, nt & thin by cup  Pt w/ immed retrieval, mildly slow transfer w/ mild pumping noted  No overt oral residue noted  Pt w/ mult swallows on all trials  She declined many attempts and needed encouragement to take additional tsps/sips  +delayed cough after sips of thin- no other coughing w/ nt or ht though he needed mult,effortful  swallows w/ the ht  No change in O2- remained 92-94% throughout  Pt continues w/ moist coughing even prior to any material   No pocketing noted w/ any material       Assessment:  Pt w/ ongoing poor secretion management w/ moist coughing prior to any material   No pocketing today though pt w/ refusal of material w/ need for encouragement    No overt coughing w/ nt or ht, mild delayed coughing w/ thin        Plan/Recommendations:  Would resume diet w/ pt- puree and nt  Full supervision   If able VBS when cleared

## 2022-12-05 NOTE — PLAN OF CARE
Problem: Prexisting or High Potential for Compromised Skin Integrity  Goal: Skin integrity is maintained or improved  Description: INTERVENTIONS:  - Identify patients at risk for skin breakdown  - Assess and monitor skin integrity  - Assess and monitor nutrition and hydration status  - Monitor labs   - Assess for incontinence   - Turn and reposition patient  - Assist with mobility/ambulation  - Relieve pressure over bony prominences  - Avoid friction and shearing  - Provide appropriate hygiene as needed including keeping skin clean and dry  - Evaluate need for skin moisturizer/barrier cream  - Collaborate with interdisciplinary team   - Patient/family teaching  - Consider wound care consult   Outcome: Progressing     Problem: Potential for Falls  Goal: Patient will remain free of falls  Description: INTERVENTIONS:  - Educate patient/family on patient safety including physical limitations  - Instruct patient to call for assistance with activity   - Consult OT/PT to assist with strengthening/mobility   - Keep Call bell within reach  - Keep bed low and locked with side rails adjusted as appropriate  - Keep care items and personal belongings within reach  - Initiate and maintain comfort rounds  - Make Fall Risk Sign visible to staff  - Offer Toileting every 2 Hours, in advance of need  - Initiate/Maintain bed alarm  - Obtain necessary fall risk management equipment:   - Apply yellow socks and bracelet for high fall risk patients  - Consider moving patient to room near nurses station  Outcome: Progressing     Problem: MOBILITY - ADULT  Goal: Maintain or return to baseline ADL function  Description: INTERVENTIONS:  -  Assess patient's ability to carry out ADLs; assess patient's baseline for ADL function and identify physical deficits which impact ability to perform ADLs (bathing, care of mouth/teeth, toileting, grooming, dressing, etc )  - Assess/evaluate cause of self-care deficits   - Assess range of motion  - Assess patient's mobility; develop plan if impaired  - Assess patient's need for assistive devices and provide as appropriate  - Encourage maximum independence but intervene and supervise when necessary  - Involve family in performance of ADLs  - Assess for home care needs following discharge   - Consider OT consult to assist with ADL evaluation and planning for discharge  - Provide patient education as appropriate  Outcome: Progressing  Goal: Maintains/Returns to pre admission functional level  Description: INTERVENTIONS:  - Perform BMAT or MOVE assessment daily    - Set and communicate daily mobility goal to care team and patient/family/caregiver  - Collaborate with rehabilitation services on mobility goals if consulted  - Perform Range of Motion 3 times a day  - Reposition patient every 2 hours    - Dangle patient 3 times a day  - Stand patient 3 times a day  - Ambulate patient 3 times a day  - Out of bed to chair 3 times a day   - Out of bed for meals 3 times a day  - Out of bed for toileting  - Record patient progress and toleration of activity level   Outcome: Progressing     Problem: PAIN - ADULT  Goal: Verbalizes/displays adequate comfort level or baseline comfort level  Description: Interventions:  - Encourage patient to monitor pain and request assistance  - Assess pain using appropriate pain scale  - Administer analgesics based on type and severity of pain and evaluate response  - Implement non-pharmacological measures as appropriate and evaluate response  - Consider cultural and social influences on pain and pain management  - Notify physician/advanced practitioner if interventions unsuccessful or patient reports new pain  Outcome: Progressing     Problem: INFECTION - ADULT  Goal: Absence or prevention of progression during hospitalization  Description: INTERVENTIONS:  - Assess and monitor for signs and symptoms of infection  - Monitor lab/diagnostic results  - Monitor all insertion sites, i e  indwelling lines, tubes, and drains  - Monitor endotracheal if appropriate and nasal secretions for changes in amount and color  - [place] appropriate cooling/warming therapies per order  - Administer medications as ordered  - Instruct and encourage patient and family to use good hand hygiene technique  - Identify and instruct in appropriate isolation precautions for identified infection/condition  Outcome: Progressing     Problem: SAFETY ADULT  Goal: Maintains/Returns to pre admission functional level  Description: INTERVENTIONS:  - Perform BMAT or MOVE assessment daily    - Set and communicate daily mobility goal to care team and patient/family/caregiver  - Collaborate with rehabilitation services on mobility goals if consulted  - Perform Range of Motion 3 times a day  - Reposition patient every 2 hours    - Dangle patient 3 times a day  - Stand patient 3 times a day  - Ambulate patient 3 times a day  - Out of bed to chair 3 times a day   - Out of bed for meals 3 times a day  - Out of bed for toileting  - Record patient progress and toleration of activity level   Outcome: Progressing     Problem: DISCHARGE PLANNING  Goal: Discharge to home or other facility with appropriate resources  Description: INTERVENTIONS:  - Identify barriers to discharge w/patient and caregiver  - Arrange for needed discharge resources and transportation as appropriate  - Identify discharge learning needs (meds, wound care, etc )  - Arrange for interpretive services to assist at discharge as needed  - Refer to Case Management Department for coordinating discharge planning if the patient needs post-hospital services based on physician/advanced practitioner order or complex needs related to functional status, cognitive ability, or social support system  Outcome: Progressing     Problem: Knowledge Deficit  Goal: Patient/family/caregiver demonstrates understanding of disease process, treatment plan, medications, and discharge instructions  Description: Complete learning assessment and assess knowledge base  Interventions:  - Provide teaching at level of understanding  - Provide teaching via preferred learning methods  Outcome: Progressing     Problem: Nutrition/Hydration-ADULT  Goal: Nutrient/Hydration intake appropriate for improving, restoring or maintaining nutritional needs  Description: Monitor and assess patient's nutrition/hydration status for malnutrition  Collaborate with interdisciplinary team and initiate plan and interventions as ordered  Monitor patient's weight and dietary intake as ordered or per policy  Utilize nutrition screening tool and intervene as necessary  Determine patient's food preferences and provide high-protein, high-caloric foods as appropriate       INTERVENTIONS:  - Monitor oral intake, urinary output, labs, and treatment plans  - Assess nutrition and hydration status and recommend course of action  - Evaluate amount of meals eaten  - Assist patient with eating if necessary   - Allow adequate time for meals  - Recommend/ encourage appropriate diets, oral nutritional supplements, and vitamin/mineral supplements  - Order, calculate, and assess calorie counts as needed  - Recommend, monitor, and adjust tube feedings and TPN/PPN based on assessed needs  - Assess need for intravenous fluids  - Provide specific nutrition/hydration education as appropriate  - Include patient/family/caregiver in decisions related to nutrition  Outcome: Progressing     Problem: SAFETY,RESTRAINT: NV/NON-SELF DESTRUCTIVE BEHAVIOR  Goal: Remains free of harm/injury (restraint for non violent/non self-detsructive behavior)  Description: INTERVENTIONS:  - Instruct patient/family regarding restraint use   - Assess and monitor physiologic and psychological status   - Provide interventions and comfort measures to meet assessed patient needs   - Identify and implement measures to help patient regain control  - Assess readiness for release of restraint   Outcome: Progressing  Goal: Returns to optimal restraint-free functioning  Description: INTERVENTIONS:  - Assess the patient's behavior and symptoms that indicate continued need for restraint  - Identify and implement measures to help patient regain control  - Assess readiness for release of restraint   Outcome: Progressing     Problem: DECISION MAKING  Goal: Pt/Family able to effectively weigh alternatives and participate in decision making related to treatment and care  Description: INTERVENTIONS:  - Identify decision maker  - Determine when there are differences among patient's view, family's view, and healthcare provider's view of patient condition and care goals  - Facilitate patient/family articulation of goals for care  - Help patient/family identify pros/cons of alternative solutions  - Provide information as requested by patient/family  - Respect patient/family rights related to privacy and sharing information   - Serve as a liaison between patient, family and health care team  - Initiate consults as appropriate (Ethics Team, Palliative Care, Family Care Conference, etc )  Outcome: Progressing     Problem: CONFUSION/THOUGHT DISTURBANCE  Goal: Thought disturbances (confusion, delirium, depression, dementia or psychosis) are managed to maintain or return to baseline mental status and functional level  Description: INTERVENTIONS:  - Assess for possible contributors to  thought disturbance, including but not limited to medications, infection, impaired vision or hearing, underlying metabolic abnormalities, dehydration, respiratory compromise,  psychiatric diagnoses and notify attending PHYSICAN/AP  - Monitor and intervene to maintain adequate nutrition, hydration, elimination, sleep and activity  - Decrease environmental stimuli, including noise as appropriate  - Provide frequent contacts to provide refocusing, direction and reassurance as needed   Approach patient calmly with eye contact and at their level  - [place] high risk fall precautions, aspiration precautions and other safety measures, as indicated  - If delirium suspected, notify physician/AP of change in condition and request immediate in-person evaluation  - Pursue consults as appropriate including Geriatric (campus dependent), OT for cognitive evaluation/activity planning, psychiatric, pastoral care, etc   Outcome: Progressing     Problem: BEHAVIOR  Goal: Pt/Family maintain appropriate behavior and adhere to behavioral management agreement, if implemented  Description: INTERVENTIONS:  - Assess the family dynamic   - Encourage verbalization of thoughts and concerns in a socially appropriate manner  - Assess patient/family's coping skills and non-compliant behavior (including use of illegal substances)  - Utilize positive, consistent limit setting strategies supporting safety of patient, staff and others  - Initiate consult with Case Management, Spiritual Care or other ancillary services as appropriate  - If a patient's/visitor's behavior jeopardizes the safety of the patient, staff, or others, refer to organization procedure     - Notify Security of behavior or suspected illegal substances which indicate the need for search of the patient and/or belongings  - Encourage participation in the decision making process about a behavioral management agreement; implement if patient meets criteria  Outcome: Progressing

## 2022-12-05 NOTE — ASSESSMENT & PLAN NOTE
- Rib Fracture Protocol   - Pain Control  - Incentive spirometer- difficult secondary to patient mental status  - Mouth breather and dries out quickly, oral care frequently

## 2022-12-05 NOTE — CASE MANAGEMENT
Case Management Discharge Planning Note    Patient name Yashira White  Location [facility] 604/[facility] 158-69 MRN [id number]  : 3/7/1926 Date 2022       Current Admission Date: 2022  Current Admission Diagnosis:Fracture of multiple ribs of left side   Patient Active Problem List    Diagnosis Date Noted   • Ambulatory dysfunction 2022   • Dementia ([address] ) 2022   • COVID 2022   • Fracture of multiple ribs of left side 2022   • Hypertension 2017   • Snores 05/15/2017   • Taste absent 2016   • Vitamin D deficiency 2015   • Benign colon polyp 2012   • Osteopenia 2012      LOS (days): 4  Geometric Mean LOS (GMLOS) (days): 2 50  Days to GMLOS:-1 6     OBJECTIVE:  Risk of Unplanned Readmission Score: 14 08         Current admission status: Inpatient   Preferred Pharmacy:   [facility] - [id number]  Phone: [phone] Fax: [phone]    Primary Care Provider: Michael Frausto MD    Primary Insurance: MEDICARE  Secondary Insurance: [employer]    DISCHARGE DETAILS:    No accepting COVID+ SNF beds at this time

## 2022-12-05 NOTE — PLAN OF CARE
Problem: PAIN - ADULT  Goal: Verbalizes/displays adequate comfort level or baseline comfort level  Description: Interventions:  - Encourage patient to monitor pain and request assistance  - Assess pain using appropriate pain scale  - Administer analgesics based on type and severity of pain and evaluate response  - Implement non-pharmacological measures as appropriate and evaluate response  - Consider cultural and social influences on pain and pain management  - Notify physician/advanced practitioner if interventions unsuccessful or patient reports new pain  Outcome: Progressing     Problem: INFECTION - ADULT  Goal: Absence or prevention of progression during hospitalization  Description: INTERVENTIONS:  - Assess and monitor for signs and symptoms of infection  - Monitor lab/diagnostic results  - Monitor all insertion sites, i e  indwelling lines, tubes, and drains  - Monitor endotracheal if appropriate and nasal secretions for changes in amount and color  - [place] appropriate cooling/warming therapies per order  - Administer medications as ordered  - Instruct and encourage patient and family to use good hand hygiene technique  - Identify and instruct in appropriate isolation precautions for identified infection/condition  Outcome: Progressing

## 2022-12-05 NOTE — PLAN OF CARE
Problem: PHYSICAL THERAPY ADULT  Goal: Performs mobility at highest level of function for planned discharge setting  See evaluation for individualized goals  Description: Treatment/Interventions: OT, Spoke to nursing, Gait training, Bed mobility, Patient/family training, Endurance training, LE strengthening/ROM, Functional transfer training  Equipment Recommended:  (TBD)       See flowsheet documentation for full assessment, interventions and recommendations  Outcome: Progressing  Note: Prognosis: Good  Problem List: Decreased strength, Decreased range of motion, Decreased endurance, Impaired balance, Decreased mobility, Decreased coordination, Decreased cognition, Impaired judgement, Decreased safety awareness, Pain  Assessment: The patient was alert, but she was minimally verbal  She continues to require significant assistance for mobility, and she fatigues quickly once she was seated  This further progressed with two standing trials  She was returned to bed and was in the cardiac chair position post session in order to attempt to help with her secretion management  Barriers to Discharge: Inaccessible home environment, Decreased caregiver support     PT Discharge Recommendation: Post acute rehabilitation services    See flowsheet documentation for full assessment

## 2022-12-06 LAB
ANION GAP SERPL CALCULATED.3IONS-SCNC: 11 MMOL/L (ref 4–13)
BUN SERPL-MCNC: 34 MG/DL (ref 5–25)
CALCIUM SERPL-MCNC: 8.8 MG/DL (ref 8.3–10.1)
CHLORIDE SERPL-SCNC: 119 MMOL/L (ref 96–108)
CO2 SERPL-SCNC: 24 MMOL/L (ref 21–32)
CREAT SERPL-MCNC: 0.88 MG/DL (ref 0.6–1.3)
ERYTHROCYTE [DISTWIDTH] IN BLOOD BY AUTOMATED COUNT: 13.1 % (ref 11.6–15.1)
GFR SERPL CREATININE-BSD FRML MDRD: 55 ML/MIN/1.73SQ M
GLUCOSE SERPL-MCNC: 86 MG/DL (ref 65–140)
HCT VFR BLD AUTO: 40.1 % (ref 34.8–46.1)
HGB BLD-MCNC: 13.5 G/DL (ref 11.5–15.4)
MAGNESIUM SERPL-MCNC: 2.8 MG/DL (ref 1.6–2.6)
MCH RBC QN AUTO: 32.8 PG (ref 26.8–34.3)
MCHC RBC AUTO-ENTMCNC: 33.7 G/DL (ref 31.4–37.4)
MCV RBC AUTO: 97 FL (ref 82–98)
NRBC BLD AUTO-RTO: 0 /100 WBCS
PHOSPHATE SERPL-MCNC: 1 MG/DL (ref 2.3–4.1)
PLATELET # BLD AUTO: 279 THOUSANDS/UL (ref 149–390)
PMV BLD AUTO: 10.2 FL (ref 8.9–12.7)
POTASSIUM SERPL-SCNC: 2.8 MMOL/L (ref 3.5–5.3)
RBC # BLD AUTO: 4.12 MILLION/UL (ref 3.81–5.12)
SODIUM SERPL-SCNC: 154 MMOL/L (ref 135–147)
WBC # BLD AUTO: 7.17 THOUSAND/UL (ref 4.31–10.16)

## 2022-12-06 RX ORDER — POTASSIUM CHLORIDE 14.9 MG/ML
20 INJECTION INTRAVENOUS
Status: COMPLETED | OUTPATIENT
Start: 2022-12-06 | End: 2022-12-06

## 2022-12-06 RX ORDER — DEXAMETHASONE SODIUM PHOSPHATE 4 MG/ML
6 INJECTION, SOLUTION INTRA-ARTICULAR; INTRALESIONAL; INTRAMUSCULAR; INTRAVENOUS; SOFT TISSUE EVERY 24 HOURS
Status: COMPLETED | OUTPATIENT
Start: 2022-12-06 | End: 2022-12-15

## 2022-12-06 RX ORDER — WATER 1000 ML/1000ML
INJECTION, SOLUTION INTRAVENOUS
Status: DISPENSED
Start: 2022-12-06 | End: 2022-12-07

## 2022-12-06 RX ADMIN — LIDOCAINE 5% 1 PATCH: 700 PATCH TOPICAL at 08:09

## 2022-12-06 RX ADMIN — AMLODIPINE BESYLATE 2.5 MG: 2.5 TABLET ORAL at 08:09

## 2022-12-06 RX ADMIN — SODIUM CHLORIDE, SODIUM GLUCONATE, SODIUM ACETATE, POTASSIUM CHLORIDE, MAGNESIUM CHLORIDE, SODIUM PHOSPHATE, DIBASIC, AND POTASSIUM PHOSPHATE 50 ML/HR: .53; .5; .37; .037; .03; .012; .00082 INJECTION, SOLUTION INTRAVENOUS at 19:44

## 2022-12-06 RX ADMIN — POTASSIUM CHLORIDE 20 MEQ: 14.9 INJECTION, SOLUTION INTRAVENOUS at 10:43

## 2022-12-06 RX ADMIN — ENOXAPARIN SODIUM 30 MG: 30 INJECTION SUBCUTANEOUS at 08:09

## 2022-12-06 RX ADMIN — DOCUSATE SODIUM 100 MG: 100 CAPSULE, LIQUID FILLED ORAL at 08:09

## 2022-12-06 RX ADMIN — Medication 1 TABLET: at 08:09

## 2022-12-06 RX ADMIN — BIMATOPROST 1 DROP: 0.1 SOLUTION/ DROPS OPHTHALMIC at 22:01

## 2022-12-06 RX ADMIN — OLANZAPINE 2.5 MG: 10 INJECTION, POWDER, LYOPHILIZED, FOR SOLUTION INTRAMUSCULAR at 22:00

## 2022-12-06 RX ADMIN — REMDESIVIR 200 MG: 100 INJECTION, POWDER, LYOPHILIZED, FOR SOLUTION INTRAVENOUS at 16:53

## 2022-12-06 RX ADMIN — LABETALOL HYDROCHLORIDE 10 MG: 5 INJECTION, SOLUTION INTRAVENOUS at 22:45

## 2022-12-06 RX ADMIN — ENOXAPARIN SODIUM 30 MG: 30 INJECTION SUBCUTANEOUS at 22:00

## 2022-12-06 RX ADMIN — POTASSIUM CHLORIDE 20 MEQ: 14.9 INJECTION, SOLUTION INTRAVENOUS at 12:24

## 2022-12-06 RX ADMIN — DEXAMETHASONE SODIUM PHOSPHATE 6 MG: 4 INJECTION INTRA-ARTICULAR; INTRALESIONAL; INTRAMUSCULAR; INTRAVENOUS; SOFT TISSUE at 10:43

## 2022-12-06 RX ADMIN — LABETALOL HYDROCHLORIDE 10 MG: 5 INJECTION, SOLUTION INTRAVENOUS at 12:25

## 2022-12-06 RX ADMIN — BARICITINIB 2 MG: 2 TABLET, FILM COATED ORAL at 16:54

## 2022-12-06 NOTE — PLAN OF CARE
Problem: SAFETY,RESTRAINT: NV/NON-SELF DESTRUCTIVE BEHAVIOR  Goal: Remains free of harm/injury (restraint for non violent/non self-detsructive behavior)  Description: INTERVENTIONS:  - Instruct patient/family regarding restraint use   - Assess and monitor physiologic and psychological status   - Provide interventions and comfort measures to meet assessed patient needs   - Identify and implement measures to help patient regain control  - Assess readiness for release of restraint   Outcome: Completed  Goal: Returns to optimal restraint-free functioning  Description: INTERVENTIONS:  - Assess the patient's behavior and symptoms that indicate continued need for restraint  - Identify and implement measures to help patient regain control  - Assess readiness for release of restraint   Outcome: Completed     Problem: DECISION MAKING  Goal: Pt/Family able to effectively weigh alternatives and participate in decision making related to treatment and care  Description: INTERVENTIONS:  - Identify decision maker  - Determine when there are differences among patient's view, family's view, and healthcare provider's view of patient condition and care goals  - Facilitate patient/family articulation of goals for care  - Help patient/family identify pros/cons of alternative solutions  - Provide information as requested by patient/family  - Respect patient/family rights related to privacy and sharing information   - Serve as a liaison between patient, family and health care team  - Initiate consults as appropriate (Ethics Team, Palliative Care, Family Care Conference, etc )  Outcome: Completed     Problem: CONFUSION/THOUGHT DISTURBANCE  Goal: Thought disturbances (confusion, delirium, depression, dementia or psychosis) are managed to maintain or return to baseline mental status and functional level  Description: INTERVENTIONS:  - Assess for possible contributors to  thought disturbance, including but not limited to medications, infection, impaired vision or hearing, underlying metabolic abnormalities, dehydration, respiratory compromise,  psychiatric diagnoses and notify attending PHYSICAN/AP  - Monitor and intervene to maintain adequate nutrition, hydration, elimination, sleep and activity  - Decrease environmental stimuli, including noise as appropriate  - Provide frequent contacts to provide refocusing, direction and reassurance as needed  Approach patient calmly with eye contact and at their level  - [place] high risk fall precautions, aspiration precautions and other safety measures, as indicated  - If delirium suspected, notify physician/AP of change in condition and request immediate in-person evaluation  - Pursue consults as appropriate including Geriatric (campus dependent), OT for cognitive evaluation/activity planning, psychiatric, pastoral care, etc   Outcome: Completed     Problem: BEHAVIOR  Goal: Pt/Family maintain appropriate behavior and adhere to behavioral management agreement, if implemented  Description: INTERVENTIONS:  - Assess the family dynamic   - Encourage verbalization of thoughts and concerns in a socially appropriate manner  - Assess patient/family's coping skills and non-compliant behavior (including use of illegal substances)  - Utilize positive, consistent limit setting strategies supporting safety of patient, staff and others  - Initiate consult with Case Management, Spiritual Care or other ancillary services as appropriate  - If a patient's/visitor's behavior jeopardizes the safety of the patient, staff, or others, refer to organization procedure     - Notify Security of behavior or suspected illegal substances which indicate the need for search of the patient and/or belongings  - Encourage participation in the decision making process about a behavioral management agreement; implement if patient meets criteria  Outcome: Completed

## 2022-12-06 NOTE — PROGRESS NOTES
Progress Note - Geriatric Medicine   Olga Luther 80 y o  female MRN: [id number]  Unit/Bed#: [facility] 604-01 Encounter: [id number]      Assessment/Plan:    Acute COVID-19 infection  -positive by PCR 22  -suspected exposure on Thanksgiving per family report   -COVID pathway, supplemental O2 to maintain saturations  -isolation per protocol, appropriate PPE utilized for duration of encounter    Dementia without behavior disturbance  -moderate and progressive  -at baseline requires assist with ADLs and IADLs with recent accelerated decline  -pts sister in law who is now herself  was POA, nephew believes that  now POA but is willing to assist in anyway possible as wants to honor pts wishes  -family believes living will/advance directives completed at home and will look into them to guide next steps in care given pts recent decline now accelerated by with acute COVID infection and poor oral intake   -due to age and advanced stage of dementia would not recommend artificial nutrition via NG or peg tube as unlikely to prolong life and may contribute to pain and discomfort and detract from quality as discussed with patients Lisette Pacheco as he is primary contact and assisting with communication as pts  extremely hard of hearing making communication via telephone difficult  -continue psychosocial supports of patient and family    Goals of care discussion  -family looking for advance directives which had been reportedly completed in past but not on file in current EMR    Ambulatory dysfunction with fall  -multiple frequent falls leading to admission  -injuries as outlined below  -remains high risk recurrence, continue fall precautions  -given age and comorbidities with limited physiologic and metabolic reserves anticipation of gains with therapy likely to be limited if any    Left-sided rib fractures  -s/p fall above  -continue acute pain control  -current requires supplemental O2 via NC    Oropharyngeal dysphagia  -currently requiring modified diet puree with nectar thick  -be therapy following, appreciate recommendations  -continue aspiration precautions    Frailty syndrome in patient  -due to age and comorbidities patient is very frail and high risk of acute decompensation even mild additional metabolic stressors/derangements  -continue supportive cares and ongoing goals of care discussions with family to ensure treatments and interventions align with wishes and goals of care  -consideration of transition from curative to comfort/quality of life goals would not be unreasonable at this time, continue psychosocial supports of pt and family    Care coordination: rounded with Chandrakant Davis (RN)    Subjective: Lorene is seen and examined at bedside where she is lying resting, she remains pleasantly confused, she remains on soft wrist restraints due to inability to follow direction and pulling out IV access  She remains on isolation due to acute covid infection  Review of Systems   Unable to perform ROS: Mental status change     Objective:     Vitals: Blood pressure 169/77, pulse 91, temperature 99 3 °F (37 4 °C), resp  rate 16, height 4' 10" (1 473 m), weight 56 6 kg (124 lb 12 5 oz), SpO2 92 %, not currently breastfeeding  ,Body mass index is 26 08 kg/m²  Intake/Output Summary (Last 24 hours) at 12/6/2022 1852  Last data filed at 12/6/2022 1825  Gross per 24 hour   Intake 1551 25 ml   Output 100 ml   Net 1451 25 ml     Current Medications: Reviewed    Physical Exam:   Physical Exam  Vitals and nursing note reviewed  Constitutional:       Comments: Thin frail acutely ill elderly female    HENT:      Head: Normocephalic  Comments: Left facial hematoma      Nose: Nose normal       Comments: O2 via NC     Mouth/Throat:      Mouth: Mucous membranes are dry  Comments: Membranes extremely dry  Eyes:      General:         Right eye: Discharge present  Left eye: Discharge present       Comments: Left conjunctival hemorrhage   Neck:      Comments: Trachea midline  Cardiovascular:      Rate and Rhythm: Tachycardia present  Pulmonary:      Comments: Coarse rhonchorous bronchial breath sounds    Currently on supplemental O2 via NC  Abdominal:      General: There is no distension  Tenderness: There is no abdominal tenderness  Musculoskeletal:         General: Swelling (knee bilat with ecchymosis ) present  Right lower leg: No edema  Left lower leg: No edema  Skin:     General: Skin is dry  Comments: Thin and friable    Neurological:      Mental Status: She is alert  Comments: Awake and alert, pleasantly confused, does not follow commands    Psychiatric:      Comments: Pleasantly confused, no agitation        Invasive Devices     Peripheral Intravenous Line  Duration           Peripheral IV 12/05/22 Left Forearm 1 day          Drain  Duration           External Urinary Catheter <1 day              Lab, Imaging and other studies: I have personally reviewed pertinent reports

## 2022-12-06 NOTE — PROGRESS NOTES
[address] [facility]  Progress Note - Fior Valdez 3/7/1926, 80 y o  female MRN: [id number]  Unit/Bed#: [facility] 604-01 Encounter: [id number]  Primary Care Provider: Hermann Blunt MD   Date and time admitted to hospital: 12/1/2022 11:28 AM    COVID  Assessment & Plan  - O2 as needed, currently requiring 6 L  Patient intermittently removes O2  Will continue restraints to ensure O2 status  Will wean as appropriate  Will add a humidifier  - Isolation  - Pulmonary toilet  - CXR 12/5 Showed small left effusion, No PTX, No focal consolidation   - COVID Pathway initiated Moderate, treat for 10 days  - Monitor daily labs    Dementia [facility])  Assessment & Plan  - Geriatrics consult - recs appreciated  - Restraints secondary to impulsivity  - Re- Directable, follows commands  - no behavioral disturbances currently  - decrease in function since admission, cannot return to living at home per Geriatrics    Ambulatory dysfunction  Assessment & Plan  - PT/OT seen patient  - Rehab is recommended    Hypertension  Assessment & Plan  -PO amlodipine  - IV hydralazine if parameters met    * Fracture of multiple ribs of left side  Assessment & Plan  - Rib Fracture Protocol   - Pain Control  - Incentive spirometer- difficult secondary to patient mental status  - Mouth breather and dries out quickly, oral care frequently          Bowel Regimen: Senna colace  VTE Prophylaxis:Enoxaparin (Lovenox)     Disposition: Pending, Is COVID +, Pending Advanced directives from [address] [place] [address]   Chief Complaint: None    Subjective: Patient offered no new complaints     Objective   Vitals:   Temp:  [98 1 °F (36 7 °C)-100 6 °F (38 1 °C)] 99 5 °F (37 5 °C)  HR:  [] 93  Resp:  [18-22] 20  BP: (130-168)/(74-80) 166/76    I/O       12/04 0701  12/05 0700 12/05 0701  12/06 0700 12/06 0701  12/07 0700    P  O    15    I V  (mL/kg)  213 8 (3 8) 441 3 (7 8)    Total Intake(mL/kg)  213 8 (3 8) 456 3 (8 1)    Net  +213 8 +456 3 Unmeasured Urine Occurrence 3 x 6 x 1 x    Unmeasured Stool Occurrence 1 x             Physical Exam:   GENERAL APPEARANCE: NAD, appears comfortable lying in bed, does require restraints  NEURO: Confused, Answers yes and no questions  HEENT: PERRL  CV: RRR  LUNGS: Some rhonchi auscultated RLL, strong wet cough   GI: Abdomen soft, NT, ND, +bS x 4  : Urinating incontinent  MSK: RODRIGUEZ's  SKIN: Intact some bruising scattered legs    Invasive Devices     Peripheral Intravenous Line  Duration           Peripheral IV 12/05/22 Left Forearm <1 day                 PIC Score  PIC Pain Score: 3 (12/6/2022  9:00 AM)  PIC Incentive Spirometry Score: 1 (12/6/2022  3:00 AM)  PIC Cough Description: 2 (12/6/2022  3:00 AM)  PIC Total Score: 6 (12/6/2022  3:00 AM)       If the Total PIC Score </=5, did you consult APS and evaluate patient for further intervention?: yes      Pain:    Incentive Spirometry  Cough  3 = Controlled  4 = Above goal volume 3 = Strong  2 = Moderate  3 = Goal to alert volume 2 = Weak  1 = Severe  2 = Below alert volume 1 = Absent     1 = Unable to perform IS         Lab Results:   Results: I have personally reviewed all pertinent laboratory/tests results, Decreasing Plasmalyte to 50cc' hour, replacing Potassium  BMP/CMP:   Lab Results   Component Value Date    SODIUM 154 (H) 12/06/2022    K 2 8 (L) 12/06/2022     (H) 12/06/2022    CO2 24 12/06/2022    BUN 34 (H) 12/06/2022    CREATININE 0 88 12/06/2022    CALCIUM 8 8 12/06/2022    EGFR 55 12/06/2022    and CBC:   Lab Results   Component Value Date    WBC 7 17 12/06/2022    HGB 13 5 12/06/2022    HCT 40 1 12/06/2022    MCV 97 12/06/2022     12/06/2022    MCH 32 8 12/06/2022    MCHC 33 7 12/06/2022    RDW 13 1 12/06/2022    MPV 10 2 12/06/2022    NRBC 0 12/06/2022     Imaging: I have personally reviewed pertinent reports       Other Studies: None today

## 2022-12-06 NOTE — PLAN OF CARE
Problem: Prexisting or High Potential for Compromised Skin Integrity  Goal: Skin integrity is maintained or improved  Description: INTERVENTIONS:  - Identify patients at risk for skin breakdown  - Assess and monitor skin integrity  - Assess and monitor nutrition and hydration status  - Monitor labs   - Assess for incontinence   - Turn and reposition patient  - Assist with mobility/ambulation  - Relieve pressure over bony prominences  - Avoid friction and shearing  - Provide appropriate hygiene as needed including keeping skin clean and dry  - Evaluate need for skin moisturizer/barrier cream  - Collaborate with interdisciplinary team   - Patient/family teaching  - Consider wound care consult   Outcome: Progressing     Problem: Potential for Falls  Goal: Patient will remain free of falls  Description: INTERVENTIONS:  - Educate patient/family on patient safety including physical limitations  - Instruct patient to call for assistance with activity   - Consult OT/PT to assist with strengthening/mobility   - Keep Call bell within reach  - Keep bed low and locked with side rails adjusted as appropriate  - Keep care items and personal belongings within reach  - Initiate and maintain comfort rounds  - Make Fall Risk Sign visible to staff  - Offer Toileting every  Hours, in advance of need  - Initiate/Maintain alarm  - Obtain necessary fall risk management equipment:   - Apply yellow socks and bracelet for high fall risk patients  - Consider moving patient to room near nurses station  Outcome: Progressing     Problem: MOBILITY - ADULT  Goal: Maintain or return to baseline ADL function  Description: INTERVENTIONS:  -  Assess patient's ability to carry out ADLs; assess patient's baseline for ADL function and identify physical deficits which impact ability to perform ADLs (bathing, care of mouth/teeth, toileting, grooming, dressing, etc )  - Assess/evaluate cause of self-care deficits   - Assess range of motion  - Assess patient's mobility; develop plan if impaired  - Assess patient's need for assistive devices and provide as appropriate  - Encourage maximum independence but intervene and supervise when necessary  - Involve family in performance of ADLs  - Assess for home care needs following discharge   - Consider OT consult to assist with ADL evaluation and planning for discharge  - Provide patient education as appropriate  Outcome: Progressing  Goal: Maintains/Returns to pre admission functional level  Description: INTERVENTIONS:  - Perform BMAT or MOVE assessment daily    - Set and communicate daily mobility goal to care team and patient/family/caregiver  - Collaborate with rehabilitation services on mobility goals if consulted  - Perform Range of Motion  times a day  - Reposition patient every  hours    - Dangle patient  times a day  - Stand patient  times a day  - Ambulate patient  times a day  - Out of bed to chair  times a day   - Out of bed for meals  times a day  - Out of bed for toileting  - Record patient progress and toleration of activity level   Outcome: Progressing     Problem: PAIN - ADULT  Goal: Verbalizes/displays adequate comfort level or baseline comfort level  Description: Interventions:  - Encourage patient to monitor pain and request assistance  - Assess pain using appropriate pain scale  - Administer analgesics based on type and severity of pain and evaluate response  - Implement non-pharmacological measures as appropriate and evaluate response  - Consider cultural and social influences on pain and pain management  - Notify physician/advanced practitioner if interventions unsuccessful or patient reports new pain  Outcome: Progressing     Problem: INFECTION - ADULT  Goal: Absence or prevention of progression during hospitalization  Description: INTERVENTIONS:  - Assess and monitor for signs and symptoms of infection  - Monitor lab/diagnostic results  - Monitor all insertion sites, i e  indwelling lines, tubes, and drains  - Monitor endotracheal if appropriate and nasal secretions for changes in amount and color  - [place] appropriate cooling/warming therapies per order  - Administer medications as ordered  - Instruct and encourage patient and family to use good hand hygiene technique  - Identify and instruct in appropriate isolation precautions for identified infection/condition  Outcome: Progressing     Problem: SAFETY ADULT  Goal: Maintains/Returns to pre admission functional level  Description: INTERVENTIONS:  - Perform BMAT or MOVE assessment daily    - Set and communicate daily mobility goal to care team and patient/family/caregiver  - Collaborate with rehabilitation services on mobility goals if consulted  - Perform Range of Motion  times a day  - Reposition patient every  hours  - Dangle patient  times a day  - Stand patient  times a day  - Ambulate patient  times a day  - Out of bed to chair  times a day   - Out of bed for meal times a day  - Out of bed for toileting  - Record patient progress and toleration of activity level   Outcome: Progressing     Problem: Knowledge Deficit  Goal: Patient/family/caregiver demonstrates understanding of disease process, treatment plan, medications, and discharge instructions  Description: Complete learning assessment and assess knowledge base    Interventions:  - Provide teaching at level of understanding  - Provide teaching via preferred learning methods  Outcome: Progressing     Problem: DISCHARGE PLANNING  Goal: Discharge to home or other facility with appropriate resources  Description: INTERVENTIONS:  - Identify barriers to discharge w/patient and caregiver  - Arrange for needed discharge resources and transportation as appropriate  - Identify discharge learning needs (meds, wound care, etc )  - Arrange for interpretive services to assist at discharge as needed  - Refer to Case Management Department for coordinating discharge planning if the patient needs post-hospital services based on physician/advanced practitioner order or complex needs related to functional status, cognitive ability, or social support system  Outcome: Progressing     Problem: SAFETY,RESTRAINT: NV/NON-SELF DESTRUCTIVE BEHAVIOR  Goal: Remains free of harm/injury (restraint for non violent/non self-detsructive behavior)  Description: INTERVENTIONS:  - Instruct patient/family regarding restraint use   - Assess and monitor physiologic and psychological status   - Provide interventions and comfort measures to meet assessed patient needs   - Identify and implement measures to help patient regain control  - Assess readiness for release of restraint   Outcome: Progressing  Goal: Returns to optimal restraint-free functioning  Description: INTERVENTIONS:  - Assess the patient's behavior and symptoms that indicate continued need for restraint  - Identify and implement measures to help patient regain control  - Assess readiness for release of restraint   Outcome: Progressing     Problem: DEATH & DYING  Goal: Pt/Family communicate acceptance of impending death and expresses psychological comfort and peace  Description: INTERVENTIONS:  - Assess patient/family anxiety and grief process related to end of life issues  - Provide emotional, spiritual and psychosocial support  - Provide information about the patient’s health status with consideration of family and cultural values  - Communicate willingness to discuss death and facilitate grief process  with patient/family as appropriate  - Emphasize sustaining relationships within family system and community, or bib/spiritual traditions  - Initiate Spiritual Care, Pastoral care or other ancillary consults as needed  - Refer to community support groups as appropriate  Outcome: Progressing     Problem: Nutrition/Hydration-ADULT  Goal: Nutrient/Hydration intake appropriate for improving, restoring or maintaining nutritional needs  Description: Monitor and assess patient's nutrition/hydration status for malnutrition  Collaborate with interdisciplinary team and initiate plan and interventions as ordered  Monitor patient's weight and dietary intake as ordered or per policy  Utilize nutrition screening tool and intervene as necessary  Determine patient's food preferences and provide high-protein, high-caloric foods as appropriate  INTERVENTIONS:  - Monitor oral intake, urinary output, labs, and treatment plans  - Assess nutrition and hydration status and recommend course of action  - Evaluate amount of meals eaten  - Assist patient with eating if necessary   - Allow adequate time for meals  - Recommend/ encourage appropriate diets, oral nutritional supplements, and vitamin/mineral supplements  - Order, calculate, and assess calorie counts as needed  - Recommend, monitor, and adjust tube feedings and TPN/PPN based on assessed needs  - Assess need for intravenous fluids  - Provide specific nutrition/hydration education as appropriate  - Include patient/family/caregiver in decisions related to nutrition  Outcome: Progressing     Problem: BEHAVIOR  Goal: Pt/Family maintain appropriate behavior and adhere to behavioral management agreement, if implemented  Description: INTERVENTIONS:  - Assess the family dynamic   - Encourage verbalization of thoughts and concerns in a socially appropriate manner  - Assess patient/family's coping skills and non-compliant behavior (including use of illegal substances)  - Utilize positive, consistent limit setting strategies supporting safety of patient, staff and others  - Initiate consult with Case Management, Spiritual Care or other ancillary services as appropriate  - If a patient's/visitor's behavior jeopardizes the safety of the patient, staff, or others, refer to organization procedure     - Notify Security of behavior or suspected illegal substances which indicate the need for search of the patient and/or belongings  - Encourage participation in the decision making process about a behavioral management agreement; implement if patient meets criteria  Outcome: Progressing

## 2022-12-06 NOTE — ASSESSMENT & PLAN NOTE
- O2 as needed, currently requiring 6 L  Patient intermittently removes O2  Will continue restraints to ensure O2 status  Will wean as appropriate    Will add a humidifier  - Isolation  - Pulmonary toilet  - CXR 12/5 Showed small left effusion, No PTX, No focal consolidation   - COVID Pathway initiated Moderate, treat for 10 days  - Monitor daily labs

## 2022-12-07 ENCOUNTER — APPOINTMENT (INPATIENT)
Dept: RADIOLOGY | Facility: HOSPITAL | Age: 87
End: 2022-12-07

## 2022-12-07 PROBLEM — J96.00 ACUTE RESPIRATORY FAILURE (HCC): Status: ACTIVE | Noted: 2022-12-07

## 2022-12-07 PROBLEM — E87.0 HYPERNATREMIA: Status: ACTIVE | Noted: 2022-12-07

## 2022-12-07 LAB
ALBUMIN SERPL BCP-MCNC: 1.9 G/DL (ref 3.5–5)
ALP SERPL-CCNC: 69 U/L (ref 46–116)
ALT SERPL W P-5'-P-CCNC: 35 U/L (ref 12–78)
AMORPH URATE CRY URNS QL MICRO: ABNORMAL
ANION GAP SERPL CALCULATED.3IONS-SCNC: 7 MMOL/L (ref 4–13)
AST SERPL W P-5'-P-CCNC: 38 U/L (ref 5–45)
BACTERIA UR QL AUTO: ABNORMAL /HPF
BILIRUB SERPL-MCNC: 1.19 MG/DL (ref 0.2–1)
BILIRUB UR QL STRIP: NEGATIVE
BUN SERPL-MCNC: 43 MG/DL (ref 5–25)
CALCIUM ALBUM COR SERPL-MCNC: 11 MG/DL (ref 8.3–10.1)
CALCIUM SERPL-MCNC: 9.3 MG/DL (ref 8.3–10.1)
CHLORIDE SERPL-SCNC: 122 MMOL/L (ref 96–108)
CLARITY UR: ABNORMAL
CO2 SERPL-SCNC: 27 MMOL/L (ref 21–32)
COLOR UR: ABNORMAL
CREAT SERPL-MCNC: 0.99 MG/DL (ref 0.6–1.3)
ERYTHROCYTE [DISTWIDTH] IN BLOOD BY AUTOMATED COUNT: 13.2 % (ref 11.6–15.1)
GFR SERPL CREATININE-BSD FRML MDRD: 48 ML/MIN/1.73SQ M
GLUCOSE SERPL-MCNC: 125 MG/DL (ref 65–140)
GLUCOSE UR STRIP-MCNC: NEGATIVE MG/DL
HCT VFR BLD AUTO: 43.1 % (ref 34.8–46.1)
HGB BLD-MCNC: 13.8 G/DL (ref 11.5–15.4)
HGB UR QL STRIP.AUTO: ABNORMAL
KETONES UR STRIP-MCNC: ABNORMAL MG/DL
LEUKOCYTE ESTERASE UR QL STRIP: ABNORMAL
MCH RBC QN AUTO: 32.5 PG (ref 26.8–34.3)
MCHC RBC AUTO-ENTMCNC: 32 G/DL (ref 31.4–37.4)
MCV RBC AUTO: 101 FL (ref 82–98)
NITRITE UR QL STRIP: NEGATIVE
NON-SQ EPI CELLS URNS QL MICRO: ABNORMAL /HPF
OSMOLALITY UR/SERPL-RTO: 341 MMOL/KG (ref 282–298)
OSMOLALITY UR: 372 MMOL/KG
PH UR STRIP.AUTO: 6 [PH]
PLATELET # BLD AUTO: 313 THOUSANDS/UL (ref 149–390)
PMV BLD AUTO: 9.7 FL (ref 8.9–12.7)
POTASSIUM SERPL-SCNC: 3.5 MMOL/L (ref 3.5–5.3)
PROT SERPL-MCNC: 6.1 G/DL (ref 6.4–8.4)
PROT UR STRIP-MCNC: ABNORMAL MG/DL
RBC # BLD AUTO: 4.25 MILLION/UL (ref 3.81–5.12)
RBC #/AREA URNS AUTO: ABNORMAL /HPF
SODIUM 24H UR-SCNC: 110 MOL/L
SODIUM SERPL-SCNC: 156 MMOL/L (ref 135–147)
SP GR UR STRIP.AUTO: 1.01 (ref 1–1.03)
UROBILINOGEN UR STRIP-ACNC: <2 MG/DL
WBC # BLD AUTO: 7.6 THOUSAND/UL (ref 4.31–10.16)
WBC #/AREA URNS AUTO: ABNORMAL /HPF
WBC CLUMPS # UR AUTO: PRESENT /UL

## 2022-12-07 RX ORDER — AMLODIPINE BESYLATE 2.5 MG/1
2.5 TABLET ORAL DAILY
Status: CANCELLED | OUTPATIENT
Start: 2022-12-08

## 2022-12-07 RX ORDER — AMLODIPINE BESYLATE 2.5 MG/1
2.5 TABLET ORAL DAILY
Status: DISCONTINUED | OUTPATIENT
Start: 2022-12-08 | End: 2022-12-13

## 2022-12-07 RX ORDER — DEXTROSE MONOHYDRATE 50 MG/ML
50 INJECTION, SOLUTION INTRAVENOUS CONTINUOUS
Status: DISCONTINUED | OUTPATIENT
Start: 2022-12-07 | End: 2022-12-10

## 2022-12-07 RX ADMIN — ENOXAPARIN SODIUM 30 MG: 30 INJECTION SUBCUTANEOUS at 21:46

## 2022-12-07 RX ADMIN — DEXAMETHASONE SODIUM PHOSPHATE 6 MG: 4 INJECTION INTRA-ARTICULAR; INTRALESIONAL; INTRAMUSCULAR; INTRAVENOUS; SOFT TISSUE at 10:55

## 2022-12-07 RX ADMIN — POTASSIUM PHOSPHATE, MONOBASIC AND POTASSIUM PHOSPHATE, DIBASIC 21 MMOL: 224; 236 INJECTION, SOLUTION, CONCENTRATE INTRAVENOUS at 16:20

## 2022-12-07 RX ADMIN — BIMATOPROST 1 DROP: 0.1 SOLUTION/ DROPS OPHTHALMIC at 21:46

## 2022-12-07 RX ADMIN — CEFTRIAXONE SODIUM 1000 MG: 10 INJECTION, POWDER, FOR SOLUTION INTRAVENOUS at 14:24

## 2022-12-07 RX ADMIN — ENOXAPARIN SODIUM 30 MG: 30 INJECTION SUBCUTANEOUS at 08:31

## 2022-12-07 RX ADMIN — REMDESIVIR 100 MG: 100 INJECTION, POWDER, LYOPHILIZED, FOR SOLUTION INTRAVENOUS at 14:24

## 2022-12-07 RX ADMIN — LABETALOL HYDROCHLORIDE 10 MG: 5 INJECTION, SOLUTION INTRAVENOUS at 10:55

## 2022-12-07 RX ADMIN — DEXTROSE 50 ML/HR: 5 SOLUTION INTRAVENOUS at 10:55

## 2022-12-07 RX ADMIN — LIDOCAINE 5% 1 PATCH: 700 PATCH TOPICAL at 08:32

## 2022-12-07 NOTE — ASSESSMENT & PLAN NOTE
- Left 3-7 rib fractures, present on admission  - S/p fall  - Rib Fracture Protocol   - Pain Control  - Incentive spirometer- difficult secondary to patient mental status  - Mouth breather and dries out quickly, oral care frequently  - Contributing to acute respiratory failure

## 2022-12-07 NOTE — CONSULTS
Consultation - Nephrology   Edenilson King 80 y o  female MRN: [id number]  Unit/Bed#: [facility] 604-01 Encounter: [id number]    ASSESSMENT and PLAN:  Hypernatremia   Etiology: Suspect secondary to free water deficit with poor oral intake  Assessment:  • Admission sodium 141  • Slowly increased and currently 156  • Now on IVF D5W at 50 ml/hour  Plan:  • Continue with D5 W at 50 ml for now  • Patient with work of breathing and being monitor closely by trauma team  • Encourage free water intake-difficult with dysphagia diet  • Check BMP in am    Blood pressure/hypertension:  Assessment and Plan:  • Current BP elevated  • Home medications include: none   • Current medications include: Amlodipine 2 5 mg daily, PRN hydralazine 5 mg IV q 6 hours and labetalol for SPB > 160-  • Not taking oral medications in currently   • Maximize hemodynamics to maintain MAP >65  • Avoid hypotension or fluctuations in blood pressure  • Will continue to trend    S/P Fall/ complicated with left rib fractures  Assessment and Plan:  • Management per primary team    COVID  Assessment and Plan:  • Management per primary team  • On oxygen  • On moderate pathway  • Now with increase work of breathing    Other medical issues  · Dementia  · Ambulatory dysfunction  · Macular degeneration      HISTORY OF PRESENT ILLNESS:  Requesting Physician: Ana Cristina Larios DO  Reason for Consult: Hypernatremia     Edenilson King is a 80 y o  female who has past medical history of HTN, HLD, OA, macular degeneration, Dementia who presented s/p fall on 12/2/2022  Work up revealed + COVID and currently on Moderate pathway and fracture of multiple ribs  Hospitalization complicated aspiration, worsening respiratory effort now hypernatremia  Since admission sodium has increased and currently 156 and a renal consultation is requested for hypernatremia  Currently on D5W at 50 ml/day        PAST MEDICAL HISTORY:  Past Medical History:   Diagnosis Date   • Anosmia    • Cellulitis    • Diverticulitis, colon    • Fatigue 5/15/2017   • Generalized osteoarthritis of multiple sites    • Hyperlipidemia    • Hypertension    • Impacted cerumen of both ears    • Impaired fasting glucose 7/4/2012   • Lyme disease    • Macular degeneration    • Tremor        PAST SURGICAL HISTORY:  Past Surgical History:   Procedure Laterality Date   • BREAST BIOPSY     • CATARACT EXTRACTION     • HYSTERECTOMY         ALLERGIES:  No Known Allergies    SOCIAL HISTORY:  Social History     Substance and Sexual Activity   Alcohol Use Yes   • Alcohol/week: 5 0 standard drinks   • Types: 5 Glasses of wine per week    Comment: social     Social History     Substance and Sexual Activity   Drug Use No     Social History     Tobacco Use   Smoking Status Never   Smokeless Tobacco Never       FAMILY HISTORY:  Family History   Problem Relation Age of Onset   • Lung cancer Father        MEDICATIONS:    Current Facility-Administered Medications:   •  acetaminophen (TYLENOL) rectal suppository 650 mg, 650 mg, Rectal, Q4H PRN, Guillermina Medina MD, 650 mg at 12/03/22 1147  •  albuterol (PROVENTIL HFA,VENTOLIN HFA) inhaler 2 puff, 2 puff, Inhalation, Q4H PRN, Cassius Ramos, DO  •  amLODIPine (NORVASC) tablet 2 5 mg, 2 5 mg, Oral, Daily, YG Lin Asa-C, 2 5 mg at 12/06/22 8489  •  baricitinib (OLUMIANT) tablet 2 mg, 2 mg, Oral, Q24H, JANIS Pride, 2 mg at 12/06/22 1654  •  bimatoprost (LUMIGAN) 0 01 % ophthalmic solution 1 drop, 1 drop, Right Eye, HS, YG Lin Asa-C, 1 drop at 12/06/22 2201  •  dexamethasone (DECADRON) injection 6 mg, 6 mg, Intravenous, Q24H, JANIS Pride, 6 mg at 12/07/22 1055  •  dextrose 5 % infusion, 50 mL/hr, Intravenous, Continuous, Rikki Nance PA-C, Last Rate: 50 mL/hr at 12/07/22 1055, 50 mL/hr at 12/07/22 1055  •  docusate sodium (COLACE) capsule 100 mg, 100 mg, Oral, BID, YG Lovelace-C, 100 mg at 12/06/22 0809  •  enoxaparin (LOVENOX) subcutaneous injection 30 mg, 30 mg, Subcutaneous, Q12H [address], Amaya Mcfarland PA-C, 30 mg at 12/07/22 2426  •  hydrALAZINE (APRESOLINE) injection 5 mg, 5 mg, Intravenous, Q6H PRN, Gaviota Gibson PA-C, 5 mg at 12/02/22 7334  •  labetalol (NORMODYNE) injection 10 mg, 10 mg, Intravenous, Q6H PRN, Gaviota Gibson PA-C, 10 mg at 12/07/22 1055  •  lidocaine (LIDODERM) 5 % patch 1 patch, 1 patch, Topical, Daily, Gaviota Gibson PA-C, 1 patch at 12/07/22 2838  •  multivitamin-minerals (CENTRUM) tablet 1 tablet, 1 tablet, Oral, Daily, Gaviota Gibson PA-C, 1 tablet at 12/06/22 0809  •  OLANZapine (ZyPREXA ZYDIS) dispersible tablet 5 mg, 5 mg, Oral, HS, Gaviota Gibson PA-C, 5 mg at 12/05/22 2143  •  OLANZapine (ZyPREXA) IM injection 2 5 mg, 2 5 mg, Intramuscular, Q6H PRN, Maryanne Reyes MD, 2 5 mg at 12/06/22 2200  •  oxyCODONE (ROXICODONE) IR tablet 2 5 mg, 2 5 mg, Oral, Q4H PRN, Gaviota Gibson PA-C  •  [COMPLETED] remdesivir Aletta Kida) 200 mg in sodium chloride 0 9 % 290 mL IVPB, 200 mg, Intravenous, Q24H, 200 mg at 12/06/22 1653 **FOLLOWED BY** remdesivir (Veklury) 100 mg in sodium chloride 0 9 % 270 mL IVPB, 100 mg, Intravenous, Q24H, March Span, CRNP  •  senna (SENOKOT) tablet 8 6 mg, 1 tablet, Oral, HS, Feli Jaime PA-C, 8 6 mg at 12/05/22 2148      REVIEW OF SYSTEMS:  Patient seen and examined at bedside  All information is obtained from the chart  Review of Systems   Unable to perform ROS: Mental status change         PHYSICAL EXAM:  Current weight: Weight - Scale: 56 6 kg (124 lb 12 5 oz)  Vitals:    12/07/22 0315 12/07/22 0400 12/07/22 0716 12/07/22 1040   BP:   156/94 167/86   Pulse: (!) 108  105 91   Resp:   14 20   Temp:  98 8 °F (37 1 °C) 98 7 °F (37 1 °C) 99 1 °F (37 3 °C)   TempSrc:  Axillary     SpO2: 93% 97% 97% 96%   Weight:       Height:           Physical Exam  Vitals and nursing note reviewed     Constitutional:       Appearance: She is ill-appearing  Comments: Frail,    HENT:      Head: Normocephalic  Nose: Nose normal       Mouth/Throat:      Mouth: Mucous membranes are dry  Pharynx: Oropharynx is clear  Eyes:      General:         Left eye: Discharge present  Extraocular Movements: Extraocular movements intact  Comments: Left eye hematoma   Cardiovascular:      Rate and Rhythm: Regular rhythm  Tachycardia present  Pulses: Normal pulses  Heart sounds: Normal heart sounds  Pulmonary:      Breath sounds: Rhonchi present  Comments: Work of breathing, moist NPC  Abdominal:      General: Bowel sounds are normal       Palpations: Abdomen is soft  Musculoskeletal:         General: Normal range of motion  Cervical back: Normal range of motion and neck supple  Comments: Right knee/leg ecchymosis   Skin:     General: Skin is warm and dry  Findings: Bruising present  Neurological:      General: No focal deficit present  Mental Status: She is alert     Psychiatric:         Behavior: Behavior normal               Lab Results:   Results from last 7 days   Lab Units 12/07/22  0545 12/06/22  0450 12/05/22  1350 12/05/22  0452 12/03/22  0538 12/02/22  0617 12/01/22  1145 12/01/22  1145 12/01/22  1137   WBC Thousand/uL 7 60 7 17  --  7 81   < > 6 76   < > 5 56  --    HEMOGLOBIN g/dL 13 8 13 5  --  14 3   < > 15 3   < > 15 1  --    I STAT HEMOGLOBIN g/dl  --   --   --   --   --   --   --   --  14 3   HEMATOCRIT % 43 1 40 1  --  41 4   < > 43 6   < > 44 5  --    HEMATOCRIT, ISTAT %  --   --   --   --   --   --   --   --  42   PLATELETS Thousands/uL 313 279  --  255   < > 202   < > 227  --    SODIUM mmol/L 156* 154* 151* 151*   < > 141   < > 140  --    POTASSIUM mmol/L 3 5 2 8* 3 1* 3 7   < > 4 4   < > 3 5  --    CHLORIDE mmol/L 122* 119* 119* 118*   < > 110*   < > 108  --    CO2 mmol/L 27 24 24 22   < > 21   < > 23  --    CO2, I-STAT mmol/L  --   --   --   --   --   --   --   --  25   BUN mg/dL 43* 34* 36* 33*   < > 16   < > 16  --    CREATININE mg/dL 0 99 0 88 0 99 1 14   < > 0 89   < > 0 87  --    CALCIUM mg/dL 9 3 8 8 9 0 9 2   < > 8 4   < > 9 0  --    MAGNESIUM mg/dL  --  2 8*  --   --   --  2 4  --   --   --    PHOSPHORUS mg/dL  --  1 0*  --   --   --  3 8  --   --   --    ALK PHOS U/L 69  --   --   --   --  70  --  81  --    ALT U/L 35  --   --   --   --  36  --  33  --    AST U/L 38  --   --   --   --  75*  --  53*  --    GLUCOSE, ISTAT mg/dl  --   --   --   --   --   --   --   --  108    < > = values in this interval not displayed         Other Studies:

## 2022-12-07 NOTE — ASSESSMENT & PLAN NOTE
- Hypernatremia  - Na 156 today  - Continue D5W IV fluids  - Urine studies pending  - Evidence of dehydration due to poor PO intake/ aspiration  - Discussed goals of care with nephew, who is going to talk to the patient's    Patient is to remain level 3 DNR/ DNI but at this time, family is not ready to pursue hospice    - Nephrology consult placed

## 2022-12-07 NOTE — ASSESSMENT & PLAN NOTE
- O2 as needed, currently requiring supplemental O2 and requirements have increased over the past 24 hrs    - Isolation  - Pulmonary toilet  - CXR 12/5 Showed small left effusion, No PTX, No focal consolidation   - COVID Pathway initiated Moderate, treat for 10 days  - Monitor daily labs  - Contributory to acute respiratory failure

## 2022-12-07 NOTE — PLAN OF CARE
Problem: Prexisting or High Potential for Compromised Skin Integrity  Goal: Skin integrity is maintained or improved  Description: INTERVENTIONS:  - Identify patients at risk for skin breakdown  - Assess and monitor skin integrity  - Assess and monitor nutrition and hydration status  - Monitor labs   - Assess for incontinence   - Turn and reposition patient  - Assist with mobility/ambulation  - Relieve pressure over bony prominences  - Avoid friction and shearing  - Provide appropriate hygiene as needed including keeping skin clean and dry  - Evaluate need for skin moisturizer/barrier cream  - Collaborate with interdisciplinary team   - Patient/family teaching  - Consider wound care consult   Outcome: Progressing     Problem: Potential for Falls  Goal: Patient will remain free of falls  Description: INTERVENTIONS:  - Educate patient/family on patient safety including physical limitations  - Instruct patient to call for assistance with activity   - Consult OT/PT to assist with strengthening/mobility   - Keep Call bell within reach  - Keep bed low and locked with side rails adjusted as appropriate  - Keep care items and personal belongings within reach  - Initiate and maintain comfort rounds  - Make Fall Risk Sign visible to staff  - Offer Toileting every  Hours, in advance of need  - Initiate/Maintain alarm  - Obtain necessary fall risk management equipment:   - Apply yellow socks and bracelet for high fall risk patients  - Consider moving patient to room near nurses station  Outcome: Progressing     Problem: MOBILITY - ADULT  Goal: Maintain or return to baseline ADL function  Description: INTERVENTIONS:  -  Assess patient's ability to carry out ADLs; assess patient's baseline for ADL function and identify physical deficits which impact ability to perform ADLs (bathing, care of mouth/teeth, toileting, grooming, dressing, etc )  - Assess/evaluate cause of self-care deficits   - Assess range of motion  - Assess patient's mobility; develop plan if impaired  - Assess patient's need for assistive devices and provide as appropriate  - Encourage maximum independence but intervene and supervise when necessary  - Involve family in performance of ADLs  - Assess for home care needs following discharge   - Consider OT consult to assist with ADL evaluation and planning for discharge  - Provide patient education as appropriate  Outcome: Progressing  Goal: Maintains/Returns to pre admission functional level  Description: INTERVENTIONS:  - Perform BMAT or MOVE assessment daily    - Set and communicate daily mobility goal to care team and patient/family/caregiver  - Collaborate with rehabilitation services on mobility goals if consulted  - Perform Range of Motion  times a day  - Reposition patient every  hours  - Dangle patient  times a day  - Stand patient  times a day  - Ambulate patient  times a day  - Out of bed to chair  times a day   - Out of bed for meals times a day  - Out of bed for toileting  - Record patient progress and toleration of activity level   Outcome: Progressing     Problem: PAIN - ADULT  Goal: Verbalizes/displays adequate comfort level or baseline comfort level  Description: Interventions:  - Encourage patient to monitor pain and request assistance  - Assess pain using appropriate pain scale  - Administer analgesics based on type and severity of pain and evaluate response  - Implement non-pharmacological measures as appropriate and evaluate response  - Consider cultural and social influences on pain and pain management  - Notify physician/advanced practitioner if interventions unsuccessful or patient reports new pain  Outcome: Progressing     Problem: Nutrition/Hydration-ADULT  Goal: Nutrient/Hydration intake appropriate for improving, restoring or maintaining nutritional needs  Description: Monitor and assess patient's nutrition/hydration status for malnutrition   Collaborate with interdisciplinary team and initiate plan and interventions as ordered  Monitor patient's weight and dietary intake as ordered or per policy  Utilize nutrition screening tool and intervene as necessary  Determine patient's food preferences and provide high-protein, high-caloric foods as appropriate       INTERVENTIONS:  - Monitor oral intake, urinary output, labs, and treatment plans  - Assess nutrition and hydration status and recommend course of action  - Evaluate amount of meals eaten  - Assist patient with eating if necessary   - Allow adequate time for meals  - Recommend/ encourage appropriate diets, oral nutritional supplements, and vitamin/mineral supplements  - Order, calculate, and assess calorie counts as needed  - Recommend, monitor, and adjust tube feedings and TPN/PPN based on assessed needs  - Assess need for intravenous fluids  - Provide specific nutrition/hydration education as appropriate  - Include patient/family/caregiver in decisions related to nutrition  Outcome: Progressing     Problem: SAFETY,RESTRAINT: NV/NON-SELF DESTRUCTIVE BEHAVIOR  Goal: Remains free of harm/injury (restraint for non violent/non self-detsructive behavior)  Description: INTERVENTIONS:  - Instruct patient/family regarding restraint use   - Assess and monitor physiologic and psychological status   - Provide interventions and comfort measures to meet assessed patient needs   - Identify and implement measures to help patient regain control  - Assess readiness for release of restraint   Outcome: Progressing  Goal: Returns to optimal restraint-free functioning  Description: INTERVENTIONS:  - Assess the patient's behavior and symptoms that indicate continued need for restraint  - Identify and implement measures to help patient regain control  - Assess readiness for release of restraint   Outcome: Progressing     Problem: COPING  Goal: Pt/Family able to verbalize concerns and demonstrate effective coping strategies  Description: INTERVENTIONS:  - Assist patient/family to identify coping skills, available support systems and cultural and spiritual values  - Provide emotional support, including active listening and acknowledgement of concerns of patient and caregivers  - Reduce environmental stimuli, as able  - Provide patient education  - Assess for spiritual pain/suffering and initiate spiritual care, including notification of Pastoral Care or bib based community as needed  - Assess effectiveness of coping strategies  Outcome: Progressing  Goal: Will report anxiety at manageable levels  Description: INTERVENTIONS:  - Administer medication as ordered  - Teach and encourage coping skills  - Provide emotional support  - Assess patient/family for anxiety and ability to cope  Outcome: Progressing     Problem: CONFUSION/THOUGHT DISTURBANCE  Goal: Thought disturbances (confusion, delirium, depression, dementia or psychosis) are managed to maintain or return to baseline mental status and functional level  Description: INTERVENTIONS:  - Assess for possible contributors to  thought disturbance, including but not limited to medications, infection, impaired vision or hearing, underlying metabolic abnormalities, dehydration, respiratory compromise,  psychiatric diagnoses and notify attending PHYSICAN/AP  - Monitor and intervene to maintain adequate nutrition, hydration, elimination, sleep and activity  - Decrease environmental stimuli, including noise as appropriate  - Provide frequent contacts to provide refocusing, direction and reassurance as needed  Approach patient calmly with eye contact and at their level    - [place] high risk fall precautions, aspiration precautions and other safety measures, as indicated  - If delirium suspected, notify physician/AP of change in condition and request immediate in-person evaluation  - Pursue consults as appropriate including Geriatric (campus dependent), OT for cognitive evaluation/activity planning, psychiatric, pastoral care, etc   Outcome: Progressing     Problem: BEHAVIOR  Goal: Pt/Family maintain appropriate behavior and adhere to behavioral management agreement, if implemented  Description: INTERVENTIONS:  - Assess the family dynamic   - Encourage verbalization of thoughts and concerns in a socially appropriate manner  - Assess patient/family's coping skills and non-compliant behavior (including use of illegal substances)  - Utilize positive, consistent limit setting strategies supporting safety of patient, staff and others  - Initiate consult with Case Management, Spiritual Care or other ancillary services as appropriate  - If a patient's/visitor's behavior jeopardizes the safety of the patient, staff, or others, refer to organization procedure     - Notify Security of behavior or suspected illegal substances which indicate the need for search of the patient and/or belongings  - Encourage participation in the decision making process about a behavioral management agreement; implement if patient meets criteria  Outcome: Progressing

## 2022-12-07 NOTE — ASSESSMENT & PLAN NOTE
- Pt on 12 L/min O2 mid-flow today, upgrade to level 2 stepdown   Anticipate upgrade in level of care to SD1/ critical care if patient's respiratory status does not improve  - Respiratory distress on exam, increased work of breathing  - In the setting of left sided rib fractures, acute pneumonia from COVID-19 present on admission  - Dysphagia diet due to aspiration  - Moderate COVID-19 pathway  - Aggressive pulmonary hygiene  - Rib fx protocol  - Continue to discuss goals of care with nephew; continue level 3 DNR/ DNI

## 2022-12-07 NOTE — PROGRESS NOTES
[address] [facility]  Progress Note - Louisa Lee 3/7/1926, 80 y o  female MRN: [id number]  [facility] Encounter: [id number]  Primary Care Provider: Kirby Bush MD   Date and time admitted to hospital: 12/1/2022 11:28 AM    Acute respiratory failure ([facility] [address] )  Assessment & Plan  - Pt on 12 L/min O2 mid-flow today, upgrade to level 2 stepdown  Anticipate upgrade in level of care to SD1/ critical care if patient's respiratory status does not improve  - Respiratory distress on exam, increased work of breathing  - In the setting of left sided rib fractures, acute pneumonia from COVID-19 present on admission  - Dysphagia diet due to aspiration  - Moderate COVID-19 pathway  - Aggressive pulmonary hygiene  - Rib fx protocol  - Continue to discuss goals of care with nephew; continue level 3 DNR/ DNI    Hypernatremia  Assessment & Plan  - Hypernatremia  - Na 156 today  - Continue D5W IV fluids  - Urine studies pending  - Evidence of dehydration due to poor PO intake/ aspiration  - Discussed goals of care with nephew, who is going to talk to the patient's   Patient is to remain level 3 DNR/ DNI but at this time, family is not ready to pursue hospice    - Nephrology consult placed    [address]  - O2 as needed, currently requiring supplemental O2 and requirements have increased over the past 24 hrs    - Isolation  - Pulmonary toilet  - CXR 12/5 Showed small left effusion, No PTX, No focal consolidation   - COVID Pathway initiated Moderate, treat for 10 days  - Monitor daily labs  - Contributory to acute respiratory failure    Dementia ([facility] )  Assessment & Plan  - Geriatrics consult - recs appreciated  - Restraints secondary to impulsivity  - Re- Directable, follows commands  - no behavioral disturbances currently  - decrease in function since admission, cannot return to living at home per Geriatrics    Ambulatory dysfunction  Assessment & Plan  - PT/OT seen patient  - Rehab is recommended    Hypertension  Assessment & Plan  - PO amlodipine  - IV hydralazine, labetalol if parameters met    * Fracture of multiple ribs of left side  Assessment & Plan  - Left 3-7 rib fractures, present on admission  - S/p fall  - Rib Fracture Protocol   - Pain Control  - Incentive spirometer- difficult secondary to patient mental status  - Mouth breather and dries out quickly, oral care frequently  - Contributing to acute respiratory failure            Bowel Regimen: senna, colace  VTE Prophylaxis:Sequential compression device (Venodyne)  and Enoxaparin (Lovenox)     Disposition: Upgrade to SD2 due to increase in O2 requirements  I spoke with patient's nephew over the phone today, gave him an update regarding patient's increasing oxygen requirements, poor oral intake, aspiration, and general declining medical status  He reports that he is going to be talking to the patient's  today to also give him an update and discuss what they want to do as a family  He anticipates that they will wait a few days to see if the patient improves, and currently want to continue medical management  He was appreciative of the call, and is requesting another update later today  Subjective   Chief Complaint: Respiratory distress    Subjective: Patient is pleasantly confused, but is visibly in respiratory distress  She has minimal pain when left chest is palpated  Nursing reports that patient is not taking anything by mouth  Objective   Vitals:   Temp:  [98 1 °F (36 7 °C)-99 1 °F (37 3 °C)] 99 1 °F (37 3 °C)  HR:  [] 91  Resp:  [14-20] 20  BP: (150-183)/(77-94) 167/86    I/O       12/05 0701  12/06 0700 12/06 0701 12/07 0700 12/07 0701  12/08 0700    P  O   135     I V  (mL/kg) 213 8 (3 8) 1573 8 (27 8)     IV Piggyback  760     Total Intake(mL/kg) 213 8 (3 8) 2468 8 (43 6)     Urine (mL/kg/hr)  100 (0 1)     Stool  0     Total Output  100     Net +213 8 +2368 8            Unmeasured Urine Occurrence 6 x 9 x     Unmeasured Stool Occurrence  0 x            Physical Exam:   GENERAL APPEARANCE: Patient in acute respiratory distress  HEENT: NCAT; PERRL, EOMs intact; Mucous membranes dry  CV: Tachycardia  CHEST / LUNGS: Bilateral rhonchi  ABD: NABS; soft; non-distended; non-tender  : Voiding  EXT: +2 pulses bilaterally upper & lower extremities; no edema  NEURO: GCS 13 ([id number]); no focal neurologic deficits; neurovascularly intact  SKIN: Warm, dry and well perfused; no rash; no jaundice  Invasive Devices     Peripheral Intravenous Line  Duration           Peripheral IV 12/05/22 Left Forearm 1 day                 PIC Score  PIC Pain Score: 3 (12/7/2022  9:02 AM)  PIC Incentive Spirometry Score: 1 (12/7/2022  7:00 AM)  PIC Cough Description: 2 (12/7/2022  7:00 AM)  PIC Total Score: 6 (12/7/2022  7:00 AM)       If the Total PIC Score </=5, did you consult APS and evaluate patient for further intervention?: no      Pain:    Incentive Spirometry  Cough  3 = Controlled  4 = Above goal volume 3 = Strong  2 = Moderate  3 = Goal to alert volume 2 = Weak  1 = Severe  2 = Below alert volume 1 = Absent     1 = Unable to perform IS         Lab Results:   Results: I have personally reviewed all pertinent laboratory/tests results, BMP/CMP:   Lab Results   Component Value Date    SODIUM 156 (H) 12/07/2022    K 3 5 12/07/2022     (H) 12/07/2022    CO2 27 12/07/2022    BUN 43 (H) 12/07/2022    CREATININE 0 99 12/07/2022    CALCIUM 9 3 12/07/2022    AST 38 12/07/2022    ALT 35 12/07/2022    ALKPHOS 69 12/07/2022    EGFR 48 12/07/2022    and CBC:   Lab Results   Component Value Date    WBC 7 60 12/07/2022    HGB 13 8 12/07/2022    HCT 43 1 12/07/2022     (H) 12/07/2022     12/07/2022    MCH 32 5 12/07/2022    MCHC 32 0 12/07/2022    RDW 13 2 12/07/2022    MPV 9 7 12/07/2022     Imaging: I have personally reviewed pertinent reports     SEE BELOW  Other Studies:   XR chest portable   Final Result by Merry Lacy MD (12/05 1257)   Questionable small left effusion  No focal consolidation  No pneumothorax  Workstation performed: [id number]         TRAUMA - CT head wo contrast   Final Result by Ernst Cohn DO (12/01 1354)   Addendum (preliminary) 1 of 1 by Ernst Cohn DO (12/01 1354)   ADDENDUM:      I personally discussed this study with [address] on 12/1/2022    at 1:22 PM          Final   1  No CT evidence of acute intracranial process  2   Soft tissue laceration of the left superolateral periorbital soft tissues with surrounding swelling extending into the left facial soft tissues  Superior scalp hematoma is also noted  Superior scalp hematoma is also noted  3   Age-related volume loss with patchy areas of hypoattenuation within the periventricular and subcortical white matter which are likely the basis of chronic microangiopathy  Workstation performed: [id number]         TRAUMA - CT spine cervical wo contrast   Final Result by Ernst Cohn DO (12/01 1354)   Addendum (preliminary) 1 of 1 by Ernst Cohn DO (12/01 1354)   ADDENDUM:      I personally discussed this study with [address] on 12/1/2022    at 1:22 PM          Final   1  No evidence of acute cervical spine fracture or traumatic malalignment  Workstation performed: [id number]         TRAUMA - CT chest abdomen pelvis w contrast   Final Result by Ernst Cohn DO (12/01 1404)   Addendum (preliminary) 1 of 1 by Ernst Cohn DO (12/01 1404)   ADDENDUM:      The hypodensity within the spleen described in impression #6 is    retrospectively present on prior CT chest of December 2017 and likely    represents a small hemangioma or splenic cyst             Final   1  Multiple mildly displaced/nondisplaced fractures involving the anterolateral aspects of ribs 3 through 7 on the left    No CT evidence of acute traumatic sequelae within elsewhere within the chest, abdomen, or pelvis  2   Soft tissue lesion within the right kidney measuring 2 5 cm, highly concerning for renal carcinoma  Urology evaluation is recommended  3   Groundglass changes within the superior segment of the right lower lobe measuring 2 6 x 1 1 cm, suspicious for developing pneumonia  Follow-up chest CT is recommended in 2-3 months to monitor for resolution given the additional findings detailed in    impression #2  There are multiple bilateral pulmonary nodules measuring up to 9 mm which appear stable from prior CT of 2017  4   Colonic diverticulosis without evidence of acute diverticulitis  5   The rectum is moderately distended with fecal material measuring up to 6 5 cm in greatest diameter  No evidence of rectal wall thickening or perirectal inflammatory changes  6   Subcentimeter hypodensity within the spleen is too small to accurately characterize and is of indeterminate clinical sulcus  This can be evaluated with a nonemergent ultrasound as clinically warranted  I personally discussed this study with Zack Sarmiento on 12/1/2022 at 1:22 PM                Workstation performed: [id number]         XR Trauma multiple (SLB/SLRA trauma bay ONLY)   Final Result by Yadira Montoya MD (12/01 9579)      No acute cardiopulmonary disease within limitations of supine imaging  Workstation performed: [id number]         XR chest 1 view   Final Result by Yadira Montoya MD (12/01 9293)      No acute cardiopulmonary disease within limitations of supine imaging                 Workstation performed: [id number]         XR chest portable    (Results Pending)

## 2022-12-08 PROBLEM — R13.10 DYSPHAGIA: Status: ACTIVE | Noted: 2022-12-08

## 2022-12-08 PROBLEM — N39.0 UTI (URINARY TRACT INFECTION): Status: ACTIVE | Noted: 2022-12-08

## 2022-12-08 LAB
ANION GAP SERPL CALCULATED.3IONS-SCNC: 3 MMOL/L (ref 4–13)
BASOPHILS # BLD MANUAL: 0 THOUSAND/UL (ref 0–0.1)
BASOPHILS NFR MAR MANUAL: 0 % (ref 0–1)
BUN SERPL-MCNC: 39 MG/DL (ref 5–25)
CALCIUM SERPL-MCNC: 8.7 MG/DL (ref 8.3–10.1)
CHLORIDE SERPL-SCNC: 119 MMOL/L (ref 96–108)
CO2 SERPL-SCNC: 30 MMOL/L (ref 21–32)
CREAT SERPL-MCNC: 0.8 MG/DL (ref 0.6–1.3)
EOSINOPHIL # BLD MANUAL: 0 THOUSAND/UL (ref 0–0.4)
EOSINOPHIL NFR BLD MANUAL: 0 % (ref 0–6)
ERYTHROCYTE [DISTWIDTH] IN BLOOD BY AUTOMATED COUNT: 13.2 % (ref 11.6–15.1)
GFR SERPL CREATININE-BSD FRML MDRD: 62 ML/MIN/1.73SQ M
GLUCOSE SERPL-MCNC: 208 MG/DL (ref 65–140)
GLUCOSE SERPL-MCNC: 209 MG/DL (ref 65–140)
GLUCOSE SERPL-MCNC: 220 MG/DL (ref 65–140)
HCT VFR BLD AUTO: 40.7 % (ref 34.8–46.1)
HGB BLD-MCNC: 13 G/DL (ref 11.5–15.4)
LYMPHOCYTES # BLD AUTO: 0.66 THOUSAND/UL (ref 0.6–4.47)
LYMPHOCYTES # BLD AUTO: 6 % (ref 14–44)
MAGNESIUM SERPL-MCNC: 2.5 MG/DL (ref 1.6–2.6)
MCH RBC QN AUTO: 32.2 PG (ref 26.8–34.3)
MCHC RBC AUTO-ENTMCNC: 31.9 G/DL (ref 31.4–37.4)
MCV RBC AUTO: 101 FL (ref 82–98)
METAMYELOCYTES NFR BLD MANUAL: 2 % (ref 0–1)
MONOCYTES # BLD AUTO: 0.55 THOUSAND/UL (ref 0–1.22)
MONOCYTES NFR BLD: 5 % (ref 4–12)
NEUTROPHILS # BLD MANUAL: 9.54 THOUSAND/UL (ref 1.85–7.62)
NEUTS BAND NFR BLD MANUAL: 5 % (ref 0–8)
NEUTS SEG NFR BLD AUTO: 82 % (ref 43–75)
NRBC BLD AUTO-RTO: 2 /100 WBC (ref 0–2)
PHOSPHATE SERPL-MCNC: 2.2 MG/DL (ref 2.3–4.1)
PLATELET # BLD AUTO: 316 THOUSANDS/UL (ref 149–390)
PLATELET BLD QL SMEAR: ADEQUATE
PMV BLD AUTO: 9.8 FL (ref 8.9–12.7)
POTASSIUM SERPL-SCNC: 3.6 MMOL/L (ref 3.5–5.3)
RBC # BLD AUTO: 4.04 MILLION/UL (ref 3.81–5.12)
SODIUM SERPL-SCNC: 152 MMOL/L (ref 135–147)
WBC # BLD AUTO: 10.97 THOUSAND/UL (ref 4.31–10.16)

## 2022-12-08 RX ORDER — INSULIN LISPRO 100 [IU]/ML
1-5 INJECTION, SOLUTION INTRAVENOUS; SUBCUTANEOUS EVERY 6 HOURS SCHEDULED
Status: DISCONTINUED | OUTPATIENT
Start: 2022-12-08 | End: 2022-12-15 | Stop reason: HOSPADM

## 2022-12-08 RX ADMIN — LIDOCAINE 5% 1 PATCH: 700 PATCH TOPICAL at 07:51

## 2022-12-08 RX ADMIN — CEFTRIAXONE SODIUM 1000 MG: 10 INJECTION, POWDER, FOR SOLUTION INTRAVENOUS at 13:37

## 2022-12-08 RX ADMIN — DEXTROSE 75 ML/HR: 5 SOLUTION INTRAVENOUS at 01:21

## 2022-12-08 RX ADMIN — DEXTROSE 75 ML/HR: 5 SOLUTION INTRAVENOUS at 13:39

## 2022-12-08 RX ADMIN — DEXAMETHASONE SODIUM PHOSPHATE 6 MG: 4 INJECTION INTRA-ARTICULAR; INTRALESIONAL; INTRAMUSCULAR; INTRAVENOUS; SOFT TISSUE at 10:06

## 2022-12-08 RX ADMIN — BIMATOPROST 1 DROP: 0.1 SOLUTION/ DROPS OPHTHALMIC at 23:45

## 2022-12-08 RX ADMIN — ENOXAPARIN SODIUM 30 MG: 30 INJECTION SUBCUTANEOUS at 07:51

## 2022-12-08 RX ADMIN — ENOXAPARIN SODIUM 30 MG: 30 INJECTION SUBCUTANEOUS at 23:45

## 2022-12-08 RX ADMIN — INSULIN LISPRO 1 UNITS: 100 INJECTION, SOLUTION INTRAVENOUS; SUBCUTANEOUS at 23:46

## 2022-12-08 RX ADMIN — REMDESIVIR 100 MG: 100 INJECTION, POWDER, LYOPHILIZED, FOR SOLUTION INTRAVENOUS at 13:37

## 2022-12-08 RX ADMIN — LABETALOL HYDROCHLORIDE 10 MG: 5 INJECTION, SOLUTION INTRAVENOUS at 08:14

## 2022-12-08 RX ADMIN — DEXTROSE 100 ML/HR: 5 SOLUTION INTRAVENOUS at 23:17

## 2022-12-08 RX ADMIN — LIDOCAINE 5% 1 PATCH: 700 PATCH TOPICAL at 08:15

## 2022-12-08 NOTE — ASSESSMENT & PLAN NOTE
- Left 3-7 rib fractures, present on admission  - S/p fall  - Rib Fracture Protocol   - Pain Control  - Incentive spirometer- difficult secondary to patient mental status  - Mouth breather and dries out quickly, oral care frequently  - Suction at bedside for productive cough  - Contributing to acute respiratory failure

## 2022-12-08 NOTE — PROGRESS NOTES
1140 [facility] Evette 80 y o  female MRN: [id number]  Unit/Bed#: [facility] 604-01 Encounter: [id number]  Reason for Consult: hypernatremia     ASSESSMENT and PLAN:    80-year-old female with a past medical history of hypertension, hyperlipidemia, osteoarthritis,dementia, who initially presents with a fall on 12/2  Found to have multiple rib fractures and COVID-positive  Nephrology is on board for hyponatremia    1) hypernatremia    -Sodium level rising likely in setting of lack of free water intake  - Started on D5W on 12/7  - Sodium level slowly improving 12/8 1 corrected for blood sugar still elevated    Plan  - Increase D5W to 100 cc/h  - We will check glucose checks every 6-8 hours  - Deferred sugar management to primary team  - I have messaged primary team to review  - To note, it is unclear if the morning blood sugar on BMP was due to being drawn close to D5W infusion site or truly elevated in the setting of D5W    2) renal function-CKD stage III-Baseline creatinine 0 9 to 1 mg/dL    3) hypertension-monitor with low-dose amlodipine  Avoid hypotension  May need to tolerate slightly higher blood pressures    4) rib fractures-Per primary team    5) COVID-19 infection-Per primary team5)     6) hypophosphatemia-improving appropriately    7) Hypokalemia-resolved monitor if requires insulin    8) cystitis - f/u urine culture  On antibiotics per primary team    SUBJECTIVE / 24H INTERVAL HISTORY:    Pt awake but not communicating       OBJECTIVE:  Current Weight: Weight - Scale: 56 6 kg (124 lb 12 5 oz)  Vitals:    12/08/22 0232 12/08/22 0723 12/08/22 1011 12/08/22 1321   BP:  (!) 171/75 169/77    Pulse:  85 83    Resp:  20 22    Temp:  98 3 °F (36 8 °C) 98 5 °F (36 9 °C)    TempSrc:       SpO2: 95% 96% 98% 90%   Weight:       Height:           Intake/Output Summary (Last 24 hours) at 12/8/2022 1357  Last data filed at 12/8/2022 1001  Gross per 24 hour   Intake 2237 08 ml   Output --   Net 2237 08 ml     General: NAD chronically ill-appearing  Skin: no rash  Eyes: anicteric sclera  ENT: Dry mucous membrane  Neck: supple  Chest: CTA b/l, no ronchii, no wheeze, no rubs, no rales but diminished intake bases  CVS: s1s2, no murmur, no gallop, no rub  Abdomen: soft, nontender, nl sounds  Extremities: no edema LE b/l but ecchymotic sites noted  : no araujo  Neuro: Cannot assess due to patient mentation  Psych: normal affect    Medications:    Current Facility-Administered Medications:   •  acetaminophen (TYLENOL) rectal suppository 650 mg, 650 mg, Rectal, Q4H PRN, Elias Campbell MD, 650 mg at 12/03/22 1147  •  albuterol (PROVENTIL HFA,VENTOLIN HFA) inhaler 2 puff, 2 puff, Inhalation, Q4H PRN, Laura Jones DO  •  amLODIPine (NORVASC) tablet 2 5 mg, 2 5 mg, Oral, Daily, Bridget Fenton MD  •  baricitinib Sharyle Ledezma) tablet 2 mg, 2 mg, Oral, Q24H, JANIS Lloyd, 2 mg at 12/06/22 1654  •  bimatoprost (LUMIGAN) 0 01 % ophthalmic solution 1 drop, 1 drop, Right Eye, HS, Yuliya Hanna PA-C, 1 drop at 12/07/22 2146  •  cefTRIAXone (ROCEPHIN) 1,000 mg in dextrose 5 % 50 mL IVPB, 1,000 mg, Intravenous, Q24H, Rikki Nance PA-C, Last Rate: 100 mL/hr at 12/08/22 1337, 1,000 mg at 12/08/22 1337  •  dexamethasone (DECADRON) injection 6 mg, 6 mg, Intravenous, Q24H, JANIS Lloyd, 6 mg at 12/08/22 1006  •  dextrose 5 % infusion, 100 mL/hr, Intravenous, Continuous, Nahum Mcduffie MD, Last Rate: 75 mL/hr at 12/08/22 1339, 75 mL/hr at 12/08/22 1339  •  docusate sodium (COLACE) capsule 100 mg, 100 mg, Oral, BID, Feli Jaime PA-C, 100 mg at 12/06/22 0809  •  enoxaparin (LOVENOX) subcutaneous injection 30 mg, 30 mg, Subcutaneous, Q12H [address], Amaya Mcfarland PA-C, 30 mg at 12/08/22 2366  •  hydrALAZINE (APRESOLINE) injection 5 mg, 5 mg, Intravenous, Q6H PRN, Yuliya Hanna PA-C, 5 mg at 12/02/22 8134  •  labetalol (NORMODYNE) injection 10 mg, 10 mg, Intravenous, Q6H PRN, Hollandale Locket, PA-C, 10 mg at 12/08/22 1525  •  lidocaine (LIDODERM) 5 % patch 1 patch, 1 patch, Topical, Daily, Hollandale Locket, PA-C, 1 patch at 12/08/22 0815  •  multivitamin-minerals (CENTRUM) tablet 1 tablet, 1 tablet, Oral, Daily, Ghazala Locket, PA-C, 1 tablet at 12/06/22 0809  •  OLANZapine (ZyPREXA ZYDIS) dispersible tablet 5 mg, 5 mg, Oral, HS, Ghazala Locket, PA-C, 5 mg at 12/05/22 2143  •  OLANZapine (ZyPREXA) IM injection 2 5 mg, 2 5 mg, Intramuscular, Q6H PRN, Zeinab Shelton MD, 2 5 mg at 12/06/22 2200  •  oxyCODONE (ROXICODONE) IR tablet 2 5 mg, 2 5 mg, Oral, Q4H PRN, Ghazala Locket, PA-C  •  [COMPLETED] remdesivir Dorsie Bolder) 200 mg in sodium chloride 0 9 % 290 mL IVPB, 200 mg, Intravenous, Q24H, 200 mg at 12/06/22 1653 **FOLLOWED BY** remdesivir (Veklury) 100 mg in sodium chloride 0 9 % 270 mL IVPB, 100 mg, Intravenous, Q24H, JANIS Jolly, Last Rate: 0 mL/hr at 12/07/22 2244, 100 mg at 12/08/22 1337  •  senna (SENOKOT) tablet 8 6 mg, 1 tablet, Oral, HS, Josette Simmers, PA-C, 8 6 mg at 12/05/22 2148    Laboratory Results:  Results from last 7 days   Lab Units 12/08/22  0605 12/07/22  0545 12/06/22  0450 12/05/22  1350 12/05/22  0452 12/03/22  0538 12/02/22  0617   WBC Thousand/uL 10 97* 7 60 7 17  --  7 81 7 40 6 76   HEMOGLOBIN g/dL 13 0 13 8 13 5  --  14 3 13 5 15 3   HEMATOCRIT % 40 7 43 1 40 1  --  41 4 39 5 43 6   PLATELETS Thousands/uL 316 313 279  --  255 231 202   POTASSIUM mmol/L 3 6 3 5 2 8* 3 1* 3 7 3 5 4 4   CHLORIDE mmol/L 119* 122* 119* 119* 118* 114* 110*   CO2 mmol/L 30 27 24 24 22 21 21   BUN mg/dL 39* 43* 34* 36* 33* 23 16   CREATININE mg/dL 0 80 0 99 0 88 0 99 1 14 1 06 0 89   CALCIUM mg/dL 8 7 9 3 8 8 9 0 9 2 8 3 8 4   MAGNESIUM mg/dL 2 5  --  2 8*  --   --   --  2 4   PHOSPHORUS mg/dL 2 2*  --  1 0*  --   --   --  3 8

## 2022-12-08 NOTE — ASSESSMENT & PLAN NOTE
- SLP evaluation and recommendations  - Continue dysphagia 1 diet with pureed with nectar thick liquids  - VBS pending  - Aspiration precautions  - Discussed goals of care with nephew, who is going to talk to the patient's   Patient is to remain level 3 DNR/ DNI but at this time, family is not ready to pursue hospice

## 2022-12-08 NOTE — PHYSICAL THERAPY NOTE
Physical Therapy Progress Note     12/08/22 1100   Note Type   Note Type Treatment   Pain Assessment   Pain Rating: FLACC (Rest) - Face 0   Pain Rating: FLACC (Rest) - Legs 0   Pain Rating: FLACC (Rest) - Activity 0   Pain Rating: FLACC (Rest) - Cry 0   Pain Rating: FLACC (Rest) - Consolability 0   Score: FLACC (Rest) 0   Pain Rating: FLACC (Activity) - Face 1   Pain Rating: FLACC (Activity) - Legs 1   Pain Rating: FLACC (Activity) - Activity 0   Pain Rating: FLACC (Activity) - Cry 1   Pain Rating: FLACC (Activity) - Consolability 0   Score: FLACC (Activity) 3   Restrictions/Precautions   Other Precautions Cognitive; Fall Risk;O2;Pain;Multiple lines   Subjective   Subjective Pt encountered supine in bed, appeared alert & agreeable to OOB mobility  Pt reports pain in LEs with mobility & some palpation, especially at L knee  Reports no change in status with activity, but does demonstrate dyspnea with activity  Bed Mobility   Supine to Sit 2  Maximal assistance   Additional items Assist x 1   Sit to Supine 2  Maximal assistance   Additional items Assist x 1   Transfers   Sit to Stand 2  Maximal assistance   Additional items Assist x 2   Stand to Sit 2  Maximal assistance   Additional items Assist x 2   Additional Comments x2 L lateral side steps along EOB max Ax2 without AD   Balance   Static Sitting Poor   Dynamic Sitting Poor   Static Standing Poor -   Dynamic Standing Poor -   Endurance Deficit   Endurance Deficit Description pt on 8L O2 nc, SpO2 96% at rest, dropping to 91-2% seated EOB, dropping as low as 83-85% during short standing trials, returning to90% quickly with pursed lip breathing in sitting   Activity Tolerance   Activity Tolerance Patient tolerated treatment well;Patient limited by fatigue;Patient limited by pain   Nurse [address]   Assessment   Prognosis Fair   Problem List Decreased strength;Decreased range of motion;Decreased endurance; Impaired balance;Decreased mobility; Decreased coordination;Decreased cognition; Impaired judgement;Decreased safety awareness;Pain   Assessment pt required increased assist to perofrm all mobility tasks due to generalized deconditioning & impaired balance in all positions  Pt required increasd assist for balance EOB initially due to retropulsion, which improved with time EOB & eventually trialing UE tasks in anterior plane of view that allowed pt to shift weight over her LEO  Trialed use of RW for initial standing trial, but pt unable to maintain balance or utilize AD properly to warrant further use today  2nd trial performed without AD & pt was able to take shuffling sidesteps to [facility] prior to sitting,t hen lying down  pt returned to supine post session with all needs in reach & soft wrist restraints reapplied per orders  Pt remains appropriate for rehab at d/c to address functional deficits & reduce long term burden of care  Goals   Patient Goals none stated as pt minimally verbal during session   STG Expiration Date 12/14/22   PT Treatment Day 2   Plan   Treatment/Interventions Functional transfer training;LE strengthening/ROM; Therapeutic exercise; Endurance training;Patient/family training;Equipment eval/education; Bed mobility;Gait training   Progress Slow progress, decreased activity tolerance   PT Frequency Other (Comment);3-5x/wk   Recommendation   PT Discharge Recommendation Post acute rehabilitation services   Equipment Recommended   (TBD)   AM-PAC Basic Mobility Inpatient   Turning in Bed Without Bedrails 2   Lying on Back to Sitting on Edge of Flat Bed 1   Moving Bed to Chair 1   Standing Up From Chair 1   Walk in Room 1   Climb 3-5 Stairs 1   Basic Mobility Inpatient Raw Score 7   Turning Head Towards Sound 4   Follow Simple Instructions 3   Low Function Basic Mobility Raw Score 14   Low Function Basic Mobility Standardized Score 22 01   Highest Level Of Mobility   -HLM Goal 2: Bed activities/Dependent transfer   -HLM Achieved 5: Stand (1 or more minutes)     Arthur Sutton PTA    An [facility] Basic Mobility Standardized Score of 40 78 suggests pt would benefit from post acute rehab

## 2022-12-08 NOTE — CASE MANAGEMENT
Case Management Discharge Planning Note    Patient name Fior Valdez  [facility] [id number]  : 3/7/1926 Date 2022       Current Admission Date: 2022  Current Admission Diagnosis:Fracture of multiple ribs of left side   Patient Active Problem List    Diagnosis Date Noted   • UTI (urinary tract infection) 2022   • Dysphagia 2022   • Hypernatremia 2022   • Acute respiratory failure ([facility] [address] ) 2022   • Ambulatory dysfunction 2022   • Dementia ([facility] [address] ) 2022   • COVID 2022   • Fracture of multiple ribs of left side 2022   • Hypertension 2017   • Snores 05/15/2017   • Taste absent 2016   • Vitamin D deficiency 2015   • Benign colon polyp 2012   • Osteopenia 2012      LOS (days): 7  Geometric Mean LOS (GMLOS) (days): 4 30  Days to GMLOS:-2 8     OBJECTIVE:  Risk of Unplanned Readmission Score: 20 03         Current admission status: Inpatient   Preferred Pharmacy:   [address] [place], [place] [address] - [address]  Phone: [phone] Fax: [phone]    Primary Care Provider: Hermann Blunt MD    Primary Insurance: MEDICARE  Secondary Insurance: AAR    DISCHARGE DETAILS:    Plan remains to pursue IP SNF rehab  CM placed new referrals to determine if they can accept COVID+ or if they can accept after the pt's quarantine period

## 2022-12-08 NOTE — ASSESSMENT & PLAN NOTE
- Geriatrics consult - recs appreciated  - GCS 14 (C6G1W6) disoriented to time and place  - Restraints secondary to impulsivity  - Re- Directable, follows commands  - no behavioral disturbances currently  - decrease in function since admission, cannot return to living at home per Geriatrics  - Discussed goals of care with nephew, who is going to talk to the patient's   Patient is to remain level 3 DNR/ DNI but at this time, family is not ready to pursue hospice

## 2022-12-08 NOTE — PLAN OF CARE
Problem: Prexisting or High Potential for Compromised Skin Integrity  Goal: Skin integrity is maintained or improved  Description: INTERVENTIONS:  - Identify patients at risk for skin breakdown  - Assess and monitor skin integrity  - Assess and monitor nutrition and hydration status  - Monitor labs   - Assess for incontinence   - Turn and reposition patient  - Assist with mobility/ambulation  - Relieve pressure over bony prominences  - Avoid friction and shearing  - Provide appropriate hygiene as needed including keeping skin clean and dry  - Evaluate need for skin moisturizer/barrier cream  - Collaborate with interdisciplinary team   - Patient/family teaching  - Consider wound care consult   Outcome: Progressing     Problem: Potential for Falls  Goal: Patient will remain free of falls  Description: INTERVENTIONS:  - Educate patient/family on patient safety including physical limitations  - Instruct patient to call for assistance with activity   - Consult OT/PT to assist with strengthening/mobility   - Keep Call bell within reach  - Keep bed low and locked with side rails adjusted as appropriate  - Keep care items and personal belongings within reach  - Initiate and maintain comfort rounds  - Make Fall Risk Sign visible to staff  - Offer Toileting every  Hours, in advance of need  - Initiate/Maintain alarm  - Obtain necessary fall risk management equipment:   - Apply yellow socks and bracelet for high fall risk patients  - Consider moving patient to room near nurses station  Outcome: Progressing     Problem: MOBILITY - ADULT  Goal: Maintain or return to baseline ADL function  Description: INTERVENTIONS:  -  Assess patient's ability to carry out ADLs; assess patient's baseline for ADL function and identify physical deficits which impact ability to perform ADLs (bathing, care of mouth/teeth, toileting, grooming, dressing, etc )  - Assess/evaluate cause of self-care deficits   - Assess range of motion  - Assess patient's mobility; develop plan if impaired  - Assess patient's need for assistive devices and provide as appropriate  - Encourage maximum independence but intervene and supervise when necessary  - Involve family in performance of ADLs  - Assess for home care needs following discharge   - Consider OT consult to assist with ADL evaluation and planning for discharge  - Provide patient education as appropriate  Outcome: Progressing  Goal: Maintains/Returns to pre admission functional level  Description: INTERVENTIONS:  - Perform BMAT or MOVE assessment daily    - Set and communicate daily mobility goal to care team and patient/family/caregiver  - Collaborate with rehabilitation services on mobility goals if consulted  - Perform Range of Motion  times a day  - Reposition patient every  hours    - Dangle patient  times a day  - Stand patient  times a day  - Ambulate patient  times a day  - Out of bed to chair  times a day   - Out of bed for meals  times a day  - Out of bed for toileting  - Record patient progress and toleration of activity level   Outcome: Progressing     Problem: PAIN - ADULT  Goal: Verbalizes/displays adequate comfort level or baseline comfort level  Description: Interventions:  - Encourage patient to monitor pain and request assistance  - Assess pain using appropriate pain scale  - Administer analgesics based on type and severity of pain and evaluate response  - Implement non-pharmacological measures as appropriate and evaluate response  - Consider cultural and social influences on pain and pain management  - Notify physician/advanced practitioner if interventions unsuccessful or patient reports new pain  Outcome: Progressing     Problem: INFECTION - ADULT  Goal: Absence or prevention of progression during hospitalization  Description: INTERVENTIONS:  - Assess and monitor for signs and symptoms of infection  - Monitor lab/diagnostic results  - Monitor all insertion sites, i e  indwelling lines, tubes, and drains  - Monitor endotracheal if appropriate and nasal secretions for changes in amount and color  - [place] appropriate cooling/warming therapies per order  - Administer medications as ordered  - Instruct and encourage patient and family to use good hand hygiene technique  - Identify and instruct in appropriate isolation precautions for identified infection/condition  Outcome: Progressing     Problem: SAFETY ADULT  Goal: Maintains/Returns to pre admission functional level  Description: INTERVENTIONS:  - Perform BMAT or MOVE assessment daily    - Set and communicate daily mobility goal to care team and patient/family/caregiver  - Collaborate with rehabilitation services on mobility goals if consulted  - Perform Range of Motion  times a day  - Reposition patient every  hours  - Dangle patient  times a day  - Stand patient  times a day  - Ambulate patient  times a day  - Out of bed to chair  times a day   - Out of bed for meals times a day  - Out of bed for toileting  - Record patient progress and toleration of activity level   Outcome: Progressing     Problem: Knowledge Deficit  Goal: Patient/family/caregiver demonstrates understanding of disease process, treatment plan, medications, and discharge instructions  Description: Complete learning assessment and assess knowledge base    Interventions:  - Provide teaching at level of understanding  - Provide teaching via preferred learning methods  Outcome: Progressing     Problem: SAFETY,RESTRAINT: NV/NON-SELF DESTRUCTIVE BEHAVIOR  Goal: Remains free of harm/injury (restraint for non violent/non self-detsructive behavior)  Description: INTERVENTIONS:  - Instruct patient/family regarding restraint use   - Assess and monitor physiologic and psychological status   - Provide interventions and comfort measures to meet assessed patient needs   - Identify and implement measures to help patient regain control  - Assess readiness for release of restraint   Outcome: Progressing  Goal: Returns to optimal restraint-free functioning  Description: INTERVENTIONS:  - Assess the patient's behavior and symptoms that indicate continued need for restraint  - Identify and implement measures to help patient regain control  - Assess readiness for release of restraint   Outcome: Progressing

## 2022-12-08 NOTE — PROGRESS NOTES
[address] [facility]  Progress Note - Ni Wilson 3/7/1926, 80 y o  female MRN: [id number]  Unit/Bed#: [facility] 604-01 Encounter: [id number]  Primary Care Provider: Xochitl Benjamin MD   Date and time admitted to hospital: 12/1/2022 11:28 AM    Dysphagia  Assessment & Plan  - SLP evaluation and recommendations  - Continue dysphagia 1 diet with pureed with nectar thick liquids  - VBS pending  - Aspiration precautions  - Discussed goals of care with nephew, who is going to talk to the patient's   Patient is to remain level 3 DNR/ DNI but at this time, family is not ready to pursue hospice  UTI (urinary tract infection)  Assessment & Plan  - 12/7 UA indicating UTI, Rocephin started, continue for at least 3 days  - Improving clinically   - Urinary retention protocol  - Continue to monitor     Acute respiratory failure (HCC)  Assessment & Plan  - Pt on 8 L/min O2 mid-flow today, improving    - In the setting of left sided rib fractures, acute pneumonia from COVID-19 present on admission  - Dysphagia diet due to aspiration  - Moderate COVID-19 pathway  - Aggressive pulmonary hygiene  - Rib fx protocol  - Continue to discuss goals of care with nephew; continue level 3 DNR/ DNI    Hypernatremia  Assessment & Plan  - Hypernatremia  - Na 152 today, improving  - Appreciate Nephrology consult and recommendations  - Continue D5W IV fluids  - Urine studies completed  - Evidence of dehydration due to poor PO intake/ aspiration  - Discussed goals of care with nephew, who is going to talk to the patient's   Patient is to remain level 3 DNR/ DNI but at this time, family is not ready to pursue hospice  COVID  Assessment & Plan  - O2 as needed, currently requiring supplemental O2 and requirements have increased over the past 24 hrs    - Currently on 8 L O2 midflow, improving  - Contact and airborne precautions   - Pulmonary toilet  - CXR 12/5 Showed small left effusion, No PTX, No focal consolidation   - Repeat CXR 12/7 indicates development of vascular congestion  - COVID Pathway initiated Moderate, treat for 10 days  - Monitor daily labs  - Contributory to acute respiratory failure    Dementia ([address] )  Assessment & Plan  - Geriatrics consult - recs appreciated  - GCS 14 ([id number]) disoriented to time and place  - Restraints secondary to impulsivity  - Re- Directable, follows commands  - no behavioral disturbances currently  - decrease in function since admission, cannot return to living at home per Geriatrics  - Discussed goals of care with nephew, who is going to talk to the patient's   Patient is to remain level 3 DNR/ DNI but at this time, family is not ready to pursue hospice  Ambulatory dysfunction  Assessment & Plan  - PT/OT seen patient  - Rehab is recommended    Hypertension  Assessment & Plan  - Not tolerating PO meds at this time, continue IV hydralazine, labetalol if parameters met    * Fracture of multiple ribs of left side  Assessment & Plan  - Left 3-7 rib fractures, present on admission  - S/p fall  - Rib Fracture Protocol   - Pain Control  - Incentive spirometer- difficult secondary to patient mental status  - Mouth breather and dries out quickly, oral care frequently  - Suction at bedside for productive cough  - Contributing to acute respiratory failure        Bowel Regimen: senna, colace  VTE Prophylaxis:Sequential compression device (Venodyne)  and Enoxaparin (Lovenox)     Disposition: Continue SD2 level of care due to acute respiratory failure  Respiratory status is improved over the past 24 hrs, however still not optimal  If she continues to improve, anticipate MS level of care tomorrow  Subjective   Chief Complaint: "I guess I'm ok"    Subjective: Patient is confused, but breathing comfortably  She reports she feels okay  She has a strong cough with sputum production but needs nursing to suction the sputum   She is a poor historian but clinically improved from yesterday  Objective   Vitals:   Temp:  [97 1 °F (36 2 °C)-99 1 °F (37 3 °C)] 98 3 °F (36 8 °C)  HR:  [71-91] 85  Resp:  [20-22] 20  BP: (145-171)/(62-86) 171/75    I/O       12/06 0701 12/07 0700 12/07 0701 12/08 0700 12/08 0701 12/09 0700    P  O  135 0 0    I V  (mL/kg) 1573 8 (27 8) 1410 4 (24 9)     IV Piggyback 760 620     Total Intake(mL/kg) 2468 8 (43 6) 2030 4 (35 9) 0 (0)    Urine (mL/kg/hr) 100 (0 1) 204 (0 2)     Stool 0 0     Total Output 100 204     Net +2368 8 +1826 4 0           Unmeasured Urine Occurrence 9 x 10 x 1 x    Unmeasured Stool Occurrence 0 x 0 x 0 x           Physical Exam:   GENERAL APPEARANCE: Patient resting in bed, breathing without difficulties  HEENT: NCAT; PERRL, EOMs intact; Mucous membranes dry  CV: Regular rate and rhythm; no murmur/gallops/rubs appreciated  CHEST / LUNGS: Clear to auscultation; no wheezes/rales/rhonci  Left chest wall tenderness  ABD: NABS; soft; non-distended; non-tender  : Voiding  EXT: +2 pulses bilaterally upper & lower extremities; no edema  NEURO: GCS 14 ([id number]); no focal neurologic deficits; neurovascularly intact  SKIN: Warm, dry and well perfused; no rash; no jaundice        Invasive Devices     Peripheral Intravenous Line  Duration           Peripheral IV 12/05/22 Left Forearm 2 days    Peripheral IV 12/07/22 Distal;Left;Ventral (anterior) Forearm <1 day                 PIC Score  PIC Pain Score: 3 (12/8/2022  7:37 AM)  PIC Incentive Spirometry Score: 1 (12/7/2022  7:00 AM)  PIC Cough Description: 2 (12/7/2022  7:00 AM)  PIC Total Score: 6 (12/7/2022  7:00 AM)       If the Total PIC Score </=5, did you consult APS and evaluate patient for further intervention?: no      Pain:    Incentive Spirometry  Cough  3 = Controlled  4 = Above goal volume 3 = Strong  2 = Moderate  3 = Goal to alert volume 2 = Weak  1 = Severe  2 = Below alert volume 1 = Absent     1 = Unable to perform IS         Lab Results:   Results: I have personally reviewed all pertinent laboratory/tests results, BMP/CMP:   Lab Results   Component Value Date    SODIUM 152 (H) 12/08/2022    K 3 6 12/08/2022     (H) 12/08/2022    CO2 30 12/08/2022    BUN 39 (H) 12/08/2022    CREATININE 0 80 12/08/2022    CALCIUM 8 7 12/08/2022    EGFR 62 12/08/2022    and CBC:   Lab Results   Component Value Date    WBC 10 97 (H) 12/08/2022    HGB 13 0 12/08/2022    HCT 40 7 12/08/2022     (H) 12/08/2022     12/08/2022    MCH 32 2 12/08/2022    MCHC 31 9 12/08/2022    RDW 13 2 12/08/2022    MPV 9 8 12/08/2022    NRBC 2 12/08/2022     Imaging: I have personally reviewed pertinent reports  see below  Other Studies:   XR chest portable   Final Result by Ramon Phan MD (12/07 1212)      Development of mild vascular congestion      No focal pulmonary masses or infiltrates                  Workstation performed: [id number]         XR chest portable   Final Result by Reyes Grass, MD (12/05 1257)   Questionable small left effusion  No focal consolidation  No pneumothorax  Workstation performed: [id number]         TRAUMA - CT head wo contrast   Final Result by Ulises Coronado DO (12/01 1354)   Addendum (preliminary) 1 of 1 by Ulises Coronado DO (12/01 1354)   ADDENDUM:      I personally discussed this study with [address] on 12/1/2022    at 1:22 PM          Final   1  No CT evidence of acute intracranial process  2   Soft tissue laceration of the left superolateral periorbital soft tissues with surrounding swelling extending into the left facial soft tissues  Superior scalp hematoma is also noted  Superior scalp hematoma is also noted  3   Age-related volume loss with patchy areas of hypoattenuation within the periventricular and subcortical white matter which are likely the basis of chronic microangiopathy              Workstation performed: [id number]         TRAUMA - CT spine cervical wo contrast   Final Result by Andrzej Arango DO Solo (12/01 2024)   Addendum (preliminary) 1 of 1 by Kymberly Moreno DO (12/01 6314)   ADDENDUM:      I personally discussed this study with [address] on 12/1/2022    at 1:22 PM          Final   1  No evidence of acute cervical spine fracture or traumatic malalignment  Workstation performed: [id number]         TRAUMA - CT chest abdomen pelvis w contrast   Final Result by Kymberly Moreno DO (12/01 8984)   Addendum (preliminary) 1 of 1 by Kymberly Moreno DO (12/01 1404)   ADDENDUM:      The hypodensity within the spleen described in impression #6 is    retrospectively present on prior CT chest of December 2017 and likely    represents a small hemangioma or splenic cyst             Final   1  Multiple mildly displaced/nondisplaced fractures involving the anterolateral aspects of ribs 3 through 7 on the left  No CT evidence of acute traumatic sequelae within elsewhere within the chest, abdomen, or pelvis  2   Soft tissue lesion within the right kidney measuring 2 5 cm, highly concerning for renal carcinoma  Urology evaluation is recommended  3   Groundglass changes within the superior segment of the right lower lobe measuring 2 6 x 1 1 cm, suspicious for developing pneumonia  Follow-up chest CT is recommended in 2-3 months to monitor for resolution given the additional findings detailed in    impression #2  There are multiple bilateral pulmonary nodules measuring up to 9 mm which appear stable from prior CT of 2017  4   Colonic diverticulosis without evidence of acute diverticulitis  5   The rectum is moderately distended with fecal material measuring up to 6 5 cm in greatest diameter  No evidence of rectal wall thickening or perirectal inflammatory changes  6   Subcentimeter hypodensity within the spleen is too small to accurately characterize and is of indeterminate clinical sulcus    This can be evaluated with a nonemergent ultrasound as clinically warranted  I personally discussed this study with Judy Paez on 12/1/2022 at 1:22 PM                Workstation performed: [id number]         XR Trauma multiple (SLB/SLRA trauma bay ONLY)   Final Result by Tamar Barragan MD (12/01 1205)      No acute cardiopulmonary disease within limitations of supine imaging  Workstation performed: [id number]         XR chest 1 view   Final Result by Tamar Barragan MD (12/01 8488)      No acute cardiopulmonary disease within limitations of supine imaging                 Workstation performed: [id number]

## 2022-12-08 NOTE — ASSESSMENT & PLAN NOTE
- Hypernatremia  - Na 152 today, improving  - Appreciate Nephrology consult and recommendations  - Continue D5W IV fluids  - Urine studies completed  - Evidence of dehydration due to poor PO intake/ aspiration  - Discussed goals of care with nephew, who is going to talk to the patient's   Patient is to remain level 3 DNR/ DNI but at this time, family is not ready to pursue hospice

## 2022-12-08 NOTE — PLAN OF CARE
Problem: PHYSICAL THERAPY ADULT  Goal: Performs mobility at highest level of function for planned discharge setting  See evaluation for individualized goals  Description: Treatment/Interventions: OT, Spoke to nursing, Gait training, Bed mobility, Patient/family training, Endurance training, LE strengthening/ROM, Functional transfer training  Equipment Recommended:  (TBD)       See flowsheet documentation for full assessment, interventions and recommendations  Outcome: Progressing  Note: Prognosis: Fair  Problem List: Decreased strength, Decreased range of motion, Decreased endurance, Impaired balance, Decreased mobility, Decreased coordination, Decreased cognition, Impaired judgement, Decreased safety awareness, Pain  Assessment: pt required increased assist to perofrm all mobility tasks due to generalized deconditioning & impaired balance in all positions  Pt required increasd assist for balance EOB initially due to retropulsion, which improved with time EOB & eventually trialing UE tasks in anterior plane of view that allowed pt to shift weight over her LEO  Trialed use of RW for initial standing trial, but pt unable to maintain balance or utilize AD properly to warrant further use today  2nd trial performed without AD & pt was able to take shuffling sidesteps to [facility] prior to sitting,t hen lying down  pt returned to supine post session with all needs in reach & soft wrist restraints reapplied per orders  Pt remains appropriate for rehab at d/c to address functional deficits & reduce long term burden of care  Barriers to Discharge: Inaccessible home environment, Decreased caregiver support     PT Discharge Recommendation: Post acute rehabilitation services    See flowsheet documentation for full assessment

## 2022-12-08 NOTE — OCCUPATIONAL THERAPY NOTE
Occupational Therapy Treatment Note:      12/08/22 1055   OT Last Visit   OT Visit Date 12/08/22   Note Type   Note Type Treatment   Pain Assessment   Pain Assessment Tool FLACC   Pain Rating: FLACC (Rest) - Face 0   Pain Rating: FLACC (Rest) - Legs 0   Pain Rating: FLACC (Rest) - Activity 0   Pain Rating: FLACC (Rest) - Cry 0   Pain Rating: FLACC (Rest) - Consolability 0   Score: FLACC (Rest) 0   Pain Rating: FLACC (Activity) - Face 1   Pain Rating: FLACC (Activity) - Legs 1   Pain Rating: FLACC (Activity) - Activity 0   Pain Rating: FLACC (Activity) - Cry 1   Pain Rating: FLACC (Activity) - Consolability 1   Score: FLACC (Activity) 4   ADL   Where Assessed Edge of bed   Grooming Assistance 5  Supervision/Setup   Grooming Comments sba to was and dry b hands and max asst for combing tangles from hair   LB Dressing Assistance 2  Maximal Assistance   Toileting Comments pt is incontinent  at this time   Functional Standing Tolerance   Time poor standing balance, pt was moderately retropulsive in sitting which relaxed post 5 min and noted again in standing   Bed Mobility   Supine to Sit 2  Maximal assistance   Sit to Supine 2  Maximal assistance   Transfers   Sit to Stand 2  Maximal assistance   Additional items Assist x 2  (ax2, pt retropulsive  / extensor push noted)   Stand to Sit 2  Maximal assistance   Additional items Assist x 2   Additional Comments pt was able to take side steps with mod to max asst x 2 s rw   Cognition   Overall Cognitive Status Impaired   Arousal/Participation Alert   Attention Attends with cues to redirect   Orientation Level Oriented to person;Disoriented to time   Memory Decreased recall of precautions;Decreased recall of recent events;Decreased short term memory   Following Commands Follows one step commands without difficulty   Comments pt with noted brighter affect this date, more alert also  pt did not know month this session  pt noted to cry out when standing but she denied pain? Activity Tolerance   Activity Tolerance Patient limited by fatigue   Assessment   Assessment pt  participated in am ot session and was seen focusing on activity tolerence, direction following, grooming tasks seated eob and functional cognitive assessment  pt required asst x 2 to safely stand from eob  pt tolerated stadning x 2 for 20 seconds each approx  pt with decreased mucus noise noted when coughing this session, coughing was far less as well  pt requires asst for ub and lb adls at this time and asst for all iadls  will follow focusing on goals from ie  Plan   Treatment Interventions ADL retraining;Functional transfer training; Endurance training;Patient/family training; Activityengagement   Goal Expiration Date 12/16/22   OT Treatment Day 2   OT Frequency Other (comment)  (2-4x/wk)   Recommendation   OT Discharge Recommendation Post acute rehabilitation services   AM-PAC Daily Activity Inpatient   Lower Body Dressing 2   Bathing 2   Toileting 1   Upper Body Dressing 2   Grooming 3   Eating 2   Daily Activity Raw Score 12   Daily Activity Standardized Score (Calc for Raw Score >=11) 30 6   AM-PAC Applied Cognition Inpatient   Following a Speech/Presentation 2   Understanding Ordinary Conversation 2   Taking Medications 1   April A Storm

## 2022-12-08 NOTE — ASSESSMENT & PLAN NOTE
- O2 as needed, currently requiring supplemental O2 and requirements have increased over the past 24 hrs    - Currently on 8 L O2 midflow, improving  - Contact and airborne precautions   - Pulmonary toilet  - CXR 12/5 Showed small left effusion, No PTX, No focal consolidation   - Repeat CXR 12/7 indicates development of vascular congestion  - COVID Pathway initiated Moderate, treat for 10 days  - Monitor daily labs  - Contributory to acute respiratory failure

## 2022-12-08 NOTE — ASSESSMENT & PLAN NOTE
- 12/7 UA indicating UTI, Rocephin started, continue for at least 3 days  - Improving clinically   - Urinary retention protocol  - Continue to monitor

## 2022-12-08 NOTE — ASSESSMENT & PLAN NOTE
- Pt on 8 L/min O2 mid-flow today, improving    - In the setting of left sided rib fractures, acute pneumonia from COVID-19 present on admission  - Dysphagia diet due to aspiration  - Moderate COVID-19 pathway  - Aggressive pulmonary hygiene  - Rib fx protocol  - Continue to discuss goals of care with nephew; continue level 3 DNR/ DNI

## 2022-12-09 LAB
ANION GAP SERPL CALCULATED.3IONS-SCNC: 5 MMOL/L (ref 4–13)
BASOPHILS # BLD MANUAL: 0 THOUSAND/UL (ref 0–0.1)
BASOPHILS NFR MAR MANUAL: 0 % (ref 0–1)
BUN SERPL-MCNC: 31 MG/DL (ref 5–25)
CALCIUM SERPL-MCNC: 8.2 MG/DL (ref 8.3–10.1)
CHLORIDE SERPL-SCNC: 112 MMOL/L (ref 96–108)
CO2 SERPL-SCNC: 30 MMOL/L (ref 21–32)
CREAT SERPL-MCNC: 0.7 MG/DL (ref 0.6–1.3)
EOSINOPHIL # BLD MANUAL: 0 THOUSAND/UL (ref 0–0.4)
EOSINOPHIL NFR BLD MANUAL: 0 % (ref 0–6)
ERYTHROCYTE [DISTWIDTH] IN BLOOD BY AUTOMATED COUNT: 12.9 % (ref 11.6–15.1)
GFR SERPL CREATININE-BSD FRML MDRD: 73 ML/MIN/1.73SQ M
GLUCOSE SERPL-MCNC: 135 MG/DL (ref 65–140)
GLUCOSE SERPL-MCNC: 145 MG/DL (ref 65–140)
GLUCOSE SERPL-MCNC: 177 MG/DL (ref 65–140)
GLUCOSE SERPL-MCNC: 194 MG/DL (ref 65–140)
HCT VFR BLD AUTO: 37 % (ref 34.8–46.1)
HGB BLD-MCNC: 12 G/DL (ref 11.5–15.4)
LYMPHOCYTES # BLD AUTO: 0.39 THOUSAND/UL (ref 0.6–4.47)
LYMPHOCYTES # BLD AUTO: 3 % (ref 14–44)
MACROCYTES BLD QL AUTO: PRESENT
MAGNESIUM SERPL-MCNC: 2.2 MG/DL (ref 1.6–2.6)
MCH RBC QN AUTO: 32.6 PG (ref 26.8–34.3)
MCHC RBC AUTO-ENTMCNC: 32.4 G/DL (ref 31.4–37.4)
MCV RBC AUTO: 101 FL (ref 82–98)
METAMYELOCYTES NFR BLD MANUAL: 1 % (ref 0–1)
MONOCYTES # BLD AUTO: 0.79 THOUSAND/UL (ref 0–1.22)
MONOCYTES NFR BLD: 6 % (ref 4–12)
NEUTROPHILS # BLD MANUAL: 11.53 THOUSAND/UL (ref 1.85–7.62)
NEUTS BAND NFR BLD MANUAL: 1 % (ref 0–8)
NEUTS SEG NFR BLD AUTO: 87 % (ref 43–75)
NRBC BLD AUTO-RTO: 2 /100 WBC (ref 0–2)
PHOSPHATE SERPL-MCNC: 1.7 MG/DL (ref 2.3–4.1)
PLATELET # BLD AUTO: 334 THOUSANDS/UL (ref 149–390)
PLATELET BLD QL SMEAR: ADEQUATE
PMV BLD AUTO: 10.2 FL (ref 8.9–12.7)
POIKILOCYTOSIS BLD QL SMEAR: PRESENT
POTASSIUM SERPL-SCNC: 3 MMOL/L (ref 3.5–5.3)
PROMYELOCYTES NFR BLD MANUAL: 1 % (ref 0–0)
RBC # BLD AUTO: 3.68 MILLION/UL (ref 3.81–5.12)
RBC MORPH BLD: PRESENT
SODIUM SERPL-SCNC: 147 MMOL/L (ref 135–147)
VARIANT LYMPHS # BLD AUTO: 1 %
WBC # BLD AUTO: 13.1 THOUSAND/UL (ref 4.31–10.16)

## 2022-12-09 RX ORDER — POTASSIUM CHLORIDE 20MEQ/15ML
40 LIQUID (ML) ORAL ONCE
Status: COMPLETED | OUTPATIENT
Start: 2022-12-09 | End: 2022-12-09

## 2022-12-09 RX ORDER — POTASSIUM CHLORIDE 14.9 MG/ML
20 INJECTION INTRAVENOUS ONCE
Status: COMPLETED | OUTPATIENT
Start: 2022-12-09 | End: 2022-12-10

## 2022-12-09 RX ADMIN — BARICITINIB 2 MG: 2 TABLET, FILM COATED ORAL at 11:37

## 2022-12-09 RX ADMIN — ENOXAPARIN SODIUM 30 MG: 30 INJECTION SUBCUTANEOUS at 23:23

## 2022-12-09 RX ADMIN — INSULIN LISPRO 1 UNITS: 100 INJECTION, SOLUTION INTRAVENOUS; SUBCUTANEOUS at 11:54

## 2022-12-09 RX ADMIN — ENOXAPARIN SODIUM 30 MG: 30 INJECTION SUBCUTANEOUS at 09:37

## 2022-12-09 RX ADMIN — Medication 1 TABLET: at 09:37

## 2022-12-09 RX ADMIN — OLANZAPINE 5 MG: 5 TABLET, ORALLY DISINTEGRATING ORAL at 23:23

## 2022-12-09 RX ADMIN — DEXTROSE 100 ML/HR: 5 SOLUTION INTRAVENOUS at 09:47

## 2022-12-09 RX ADMIN — LIDOCAINE 5% 1 PATCH: 700 PATCH TOPICAL at 09:37

## 2022-12-09 RX ADMIN — AMLODIPINE BESYLATE 2.5 MG: 2.5 TABLET ORAL at 09:37

## 2022-12-09 RX ADMIN — SENNOSIDES 8.6 MG: 8.6 TABLET, FILM COATED ORAL at 23:23

## 2022-12-09 RX ADMIN — POTASSIUM PHOSPHATE, MONOBASIC AND POTASSIUM PHOSPHATE, DIBASIC 9 MMOL: 224; 236 INJECTION, SOLUTION, CONCENTRATE INTRAVENOUS at 18:05

## 2022-12-09 RX ADMIN — BIMATOPROST 1 DROP: 0.1 SOLUTION/ DROPS OPHTHALMIC at 23:22

## 2022-12-09 RX ADMIN — INSULIN LISPRO 1 UNITS: 100 INJECTION, SOLUTION INTRAVENOUS; SUBCUTANEOUS at 18:19

## 2022-12-09 RX ADMIN — POTASSIUM CHLORIDE 40 MEQ: 20 SOLUTION ORAL at 11:37

## 2022-12-09 RX ADMIN — DEXAMETHASONE SODIUM PHOSPHATE 6 MG: 4 INJECTION INTRA-ARTICULAR; INTRALESIONAL; INTRAMUSCULAR; INTRAVENOUS; SOFT TISSUE at 09:37

## 2022-12-09 RX ADMIN — CEFTRIAXONE SODIUM 1000 MG: 10 INJECTION, POWDER, FOR SOLUTION INTRAVENOUS at 13:32

## 2022-12-09 RX ADMIN — POTASSIUM CHLORIDE 20 MEQ: 14.9 INJECTION, SOLUTION INTRAVENOUS at 14:13

## 2022-12-09 RX ADMIN — REMDESIVIR 100 MG: 100 INJECTION, POWDER, LYOPHILIZED, FOR SOLUTION INTRAVENOUS at 11:39

## 2022-12-09 NOTE — ASSESSMENT & PLAN NOTE
- Hypernatremia  - Na 147 today, improving  - Appreciate Nephrology consult and recommendations  - Continue D5W IV fluids  - Urine studies completed  - Evidence of dehydration due to poor PO intake/ aspiration  - Discussed goals of care with nephew, who is going to talk to the patient's   Patient is to remain level 3 DNR/ DNI but at this time, family is not ready to pursue hospice  As of 12/9, continue to evaluate for progress as she is improving clinically overall  VBS pending on 12/13, per nephew  Dimple Grajeda

## 2022-12-09 NOTE — ASSESSMENT & PLAN NOTE
- Geriatrics consult - recs appreciated  - GCS 14 ([id number]) disoriented to time and place  - Restraints secondary to impulsivity  - Re- Directable, follows commands  - no behavioral disturbances currently  - decrease in function since admission, cannot return to living at home per Geriatrics  - Discussed goals of care with nephew, who is going to talk to the patient's   Patient is to remain level 3 DNR/ DNI but at this time, family is not ready to pursue hospice  As of 12/9, continue to evaluate for progress as she is improving clinically overall  VBS pending on 12/13, per nephew

## 2022-12-09 NOTE — PLAN OF CARE
Problem: Nutrition/Hydration-ADULT  Goal: Nutrient/Hydration intake appropriate for improving, restoring or maintaining nutritional needs  Description: Monitor and assess patient's nutrition/hydration status for malnutrition  Collaborate with interdisciplinary team and initiate plan and interventions as ordered  Monitor patient's weight and dietary intake as ordered or per policy  Utilize nutrition screening tool and intervene as necessary  Determine patient's food preferences and provide high-protein, high-caloric foods as appropriate       INTERVENTIONS:  - Monitor oral intake, urinary output, labs, and treatment plans  - Assess nutrition and hydration status and recommend course of action  - Evaluate amount of meals eaten  - Assist patient with eating if necessary   - Allow adequate time for meals  - Recommend/ encourage appropriate diets, oral nutritional supplements, and vitamin/mineral supplements  - Order, calculate, and assess calorie counts as needed  - Recommend, monitor, and adjust tube feedings and TPN/PPN based on assessed needs  - Assess need for intravenous fluids  - Provide specific nutrition/hydration education as appropriate  - Include patient/family/caregiver in decisions related to nutrition  Outcome: Progressing     Problem: SAFETY,RESTRAINT: NV/NON-SELF DESTRUCTIVE BEHAVIOR  Goal: Remains free of harm/injury (restraint for non violent/non self-detsructive behavior)  Description: INTERVENTIONS:  - Instruct patient/family regarding restraint use   - Assess and monitor physiologic and psychological status   - Provide interventions and comfort measures to meet assessed patient needs   - Identify and implement measures to help patient regain control  - Assess readiness for release of restraint   Outcome: Progressing  Goal: Returns to optimal restraint-free functioning  Description: INTERVENTIONS:  - Assess the patient's behavior and symptoms that indicate continued need for restraint  - Identify and implement measures to help patient regain control  - Assess readiness for release of restraint   Outcome: Progressing     Problem: COPING  Goal: Pt/Family able to verbalize concerns and demonstrate effective coping strategies  Description: INTERVENTIONS:  - Assist patient/family to identify coping skills, available support systems and cultural and spiritual values  - Provide emotional support, including active listening and acknowledgement of concerns of patient and caregivers  - Reduce environmental stimuli, as able  - Provide patient education  - Assess for spiritual pain/suffering and initiate spiritual care, including notification of Pastoral Care or bib based community as needed  - Assess effectiveness of coping strategies  Outcome: Progressing  Goal: Will report anxiety at manageable levels  Description: INTERVENTIONS:  - Administer medication as ordered  - Teach and encourage coping skills  - Provide emotional support  - Assess patient/family for anxiety and ability to cope  Outcome: Progressing     Problem: DEATH & DYING  Goal: Pt/Family communicate acceptance of impending death and expresses psychological comfort and peace  Description: INTERVENTIONS:  - Assess patient/family anxiety and grief process related to end of life issues  - Provide emotional, spiritual and psychosocial support  - Provide information about the patient’s health status with consideration of family and cultural values  - Communicate willingness to discuss death and facilitate grief process  with patient/family as appropriate  - Emphasize sustaining relationships within family system and community, or bib/spiritual traditions  - Initiate Spiritual Care, Pastoral care or other ancillary consults as needed  - Refer to community support groups as appropriate  Outcome: Progressing     Problem: DECISION MAKING  Goal: Pt/Family able to effectively weigh alternatives and participate in decision making related to treatment and care  Description: INTERVENTIONS:  - Identify decision maker  - Determine when there are differences among patient's view, family's view, and healthcare provider's view of patient condition and care goals  - Facilitate patient/family articulation of goals for care  - Help patient/family identify pros/cons of alternative solutions  - Provide information as requested by patient/family  - Respect patient/family rights related to privacy and sharing information   - Serve as a liaison between patient, family and health care team  - Initiate consults as appropriate (Ethics Team, Palliative Care, Family Care Conference, etc )  Outcome: Progressing     Problem: CONFUSION/THOUGHT DISTURBANCE  Goal: Thought disturbances (confusion, delirium, depression, dementia or psychosis) are managed to maintain or return to baseline mental status and functional level  Description: INTERVENTIONS:  - Assess for possible contributors to  thought disturbance, including but not limited to medications, infection, impaired vision or hearing, underlying metabolic abnormalities, dehydration, respiratory compromise,  psychiatric diagnoses and notify attending PHYSICAN/AP  - Monitor and intervene to maintain adequate nutrition, hydration, elimination, sleep and activity  - Decrease environmental stimuli, including noise as appropriate  - Provide frequent contacts to provide refocusing, direction and reassurance as needed  Approach patient calmly with eye contact and at their level    - [place] high risk fall precautions, aspiration precautions and other safety measures, as indicated  - If delirium suspected, notify physician/AP of change in condition and request immediate in-person evaluation  - Pursue consults as appropriate including Geriatric (campus dependent), OT for cognitive evaluation/activity planning, psychiatric, pastoral care, etc   Outcome: Progressing     Problem: BEHAVIOR  Goal: Pt/Family maintain appropriate behavior and adhere to behavioral management agreement, if implemented  Description: INTERVENTIONS:  - Assess the family dynamic   - Encourage verbalization of thoughts and concerns in a socially appropriate manner  - Assess patient/family's coping skills and non-compliant behavior (including use of illegal substances)  - Utilize positive, consistent limit setting strategies supporting safety of patient, staff and others  - Initiate consult with Case Management, Spiritual Care or other ancillary services as appropriate  - If a patient's/visitor's behavior jeopardizes the safety of the patient, staff, or others, refer to organization procedure     - Notify Security of behavior or suspected illegal substances which indicate the need for search of the patient and/or belongings  - Encourage participation in the decision making process about a behavioral management agreement; implement if patient meets criteria  Outcome: Progressing

## 2022-12-09 NOTE — PROGRESS NOTES
[facility]  Progress Note - Marilynn Blank 3/7/1926, 80 y o  female MRN: [id number]  Unit/Bed#: [facility] 604-01 Encounter: [id number]  Primary Care Provider: Mordechai Nyhan, MD   Date and time admitted to hospital: 12/1/2022 11:28 AM    Dysphagia  Assessment & Plan  - SLP evaluation and recommendations  - Continue dysphagia 1 diet with pureed with nectar thick liquids  - VBS pending on 12/13 once off of COVID precautions  - Aspiration precautions  - Discussed goals of care with nephew, who is going to talk to the patient's   Patient is to remain level 3 DNR/ DNI but at this time, family is not ready to pursue hospice  As of 12/9, continue to evaluate for progress as she is improving clinically overall  VBS pending on 12/13, per nephew  Domingo Reese UTI (urinary tract infection)  Assessment & Plan  - 12/7 UA indicating UTI, Rocephin started, continue for at least 3 days  - Improving clinically   - Urinary retention protocol  - Continue to monitor     Acute respiratory failure (HCC)  Assessment & Plan  - Pt on 8 L/min O2 mid-flow today, improving    - In the setting of left sided rib fractures, acute pneumonia from COVID-19 present on admission  - Dysphagia diet due to aspiration  - Moderate COVID-19 pathway  - Aggressive pulmonary hygiene  - Rib fx protocol  - Continue to discuss goals of care with nephew- as of 12/9, continue to evaluate for progress as she is improving clinically overall  VBS pending on 12/13; continue level 3 DNR/ DNI    Hypernatremia  Assessment & Plan  - Hypernatremia  - Na 147 today, improving  - Appreciate Nephrology consult and recommendations  - Continue D5W IV fluids  - Urine studies completed  - Evidence of dehydration due to poor PO intake/ aspiration  - Discussed goals of care with nephew, who is going to talk to the patient's   Patient is to remain level 3 DNR/ DNI but at this time, family is not ready to pursue hospice    As of 12/9, continue to evaluate for progress as she is improving clinically overall  VBS pending on 12/13, per nephew  [place] COVID  Assessment & Plan  - O2 as needed, currently requiring supplemental O2 and requirements have increased over the past 24 hrs  - Currently on 3 L O2 NC, improving  - Contact and airborne precautions   - Pulmonary toilet  - CXR 12/5 Showed small left effusion, No PTX, No focal consolidation   - Repeat CXR 12/7 indicates development of vascular congestion  - COVID Pathway initiated Moderate, treat for 10 days, through 12/12  - Monitor daily labs  - Contributory to acute respiratory failure    Dementia ([facility] [address] )  Assessment & Plan  - Geriatrics consult - recs appreciated  - GCS 14 ([id number]) disoriented to time and place  - Restraints secondary to impulsivity  - Re- Directable, follows commands  - no behavioral disturbances currently  - decrease in function since admission, cannot return to living at home per Geriatrics  - Discussed goals of care with nephew, who is going to talk to the patient's   Patient is to remain level 3 DNR/ DNI but at this time, family is not ready to pursue hospice  As of 12/9, continue to evaluate for progress as she is improving clinically overall  VBS pending on 12/13, per nephew       Ambulatory dysfunction  Assessment & Plan  - PT/OT seen patient  - Rehab is recommended    Hypertension  Assessment & Plan  - Not tolerating PO meds at this time, continue IV hydralazine, labetalol if parameters met    * Fracture of multiple ribs of left side  Assessment & Plan  - Left 3-7 rib fractures, present on admission  - S/p fall  - Rib Fracture Protocol   - Pain Control  - Incentive spirometer- difficult secondary to patient mental status  - Mouth breather and dries out quickly, oral care frequently  - Suction at bedside for productive cough  - Contributing to acute respiratory failure          Bowel Regimen: colace, senna  VTE Prophylaxis:Sequential compression device (Venodyne)  and Enoxaparin (Lovenox)     Disposition: continue SD 2 status, VBS on 12/13, speech following    Subjective   Chief Complaint: "I'm okay"    Subjective: Patient's oxygen requirement is improving  She appears to be breathing comfortably  Confused, but at baseline per staff  Continues to have difficulty swallowing  Objective   Vitals:   Temp:  [97 3 °F (36 3 °C)-98 °F (36 7 °C)] 97 3 °F (36 3 °C)  HR:  [65-74] 73  Resp:  [16-20] 16  BP: (123-164)/(68-98) 164/68    I/O       12/07 0701  12/08 0700 12/08 0701  12/09 0700 12/09 0701  12/10 0700    P  O  0 0 140    I V  (mL/kg) 1410 4 (24 9) 2274 6 (40 2)     IV Piggyback 620 320     Total Intake(mL/kg) 2030 4 (35 9) 2594 6 (45 8) 140 (2 5)    Urine (mL/kg/hr) 204 (0 2)  227 (0 5)    Stool 0  0    Total Output 204  227    Net +1826 4 +2594 6 -87           Unmeasured Urine Occurrence 10 x 9 x 3 x    Unmeasured Stool Occurrence 0 x 0 x 0 x           Physical Exam:   GENERAL APPEARANCE: NAD  NEURO: GCS 14 (4, 4, 6),non-focal  HEENT: NCAT  CV: RRR, no MGR  LUNGS: CTA bilaterally, 3 L NC  GI: soft,non-tender,non-distended  : voidign  MSK: no edema, contusion or deformity  SKIN: pink, warm ,dry    Invasive Devices     Peripheral Intravenous Line  Duration           Peripheral IV 12/05/22 Left Forearm 4 days    Peripheral IV 12/07/22 Distal;Left;Ventral (anterior) Forearm 2 days                 PIC Score  PIC Pain Score: 3 (12/9/2022 12:00 PM)  PIC Incentive Spirometry Score: 1 (12/9/2022 12:00 PM)       If the Total PIC Score </=5, did you consult APS and evaluate patient for further intervention?: N/A      Pain:    Incentive Spirometry  Cough  3 = Controlled  4 = Above goal volume 3 = Strong  2 = Moderate  3 = Goal to alert volume 2 = Weak  1 = Severe  2 = Below alert volume 1 = Absent     1 = Unable to perform IS         Lab Results:   Results: I have personally reviewed all pertinent laboratory/tests results, BMP/CMP:   Lab Results   Component Value Date    SODIUM 147 12/09/2022    K 3 0 (L) 12/09/2022     (H) 12/09/2022    CO2 30 12/09/2022    BUN 31 (H) 12/09/2022    CREATININE 0 70 12/09/2022    CALCIUM 8 2 (L) 12/09/2022    EGFR 73 12/09/2022    and CBC:   Lab Results   Component Value Date    WBC 13 10 (H) 12/09/2022    HGB 12 0 12/09/2022    HCT 37 0 12/09/2022     (H) 12/09/2022     12/09/2022    MCH 32 6 12/09/2022    MCHC 32 4 12/09/2022    RDW 12 9 12/09/2022    MPV 10 2 12/09/2022    NRBC 2 12/09/2022     Imaging: I have personally reviewed pertinent reports       Other Studies: no new

## 2022-12-09 NOTE — PROGRESS NOTES
[address] [place] 80 y o  female MRN: [id number]  Unit/Bed#: [facility] 604-01 Encounter: [id number]  Reason for Consult: hypernatremia    ASSESSMENT and PLAN:    14-year-old female with a past medical history of hypertension, hyperlipidemia, osteoarthritis,dementia, who initially presents with a fall on 12/2  Found to have multiple rib fractures and COVID-positive  Nephrology is on board for hyponatremia     1) hypernatremia     -Sodium level rising likely in setting of lack of free water intake  - Started on D5W on 12/7  - Sodium level slowly improving 12/8 1 corrected for blood sugar still elevated  -12/9- sodium level improving 147  Hypokalemic and hypophosphatemic being repleted  Sodium level is improving appropriately      Plan  - Lower D5W to 50 cc/h  -Replete potassium-potassium chloride ordered  - Replete phosphorus-potassium phosphate ordered for today  - BMP, phosphorus check in a m    -leukocytosis-Per primary team-May be in the setting of steroids as infectious causes appear to be adequately being treated currently  -Treatment for cystitis per primary team and progress     2) renal function-CKD stage III-Baseline creatinine 0 9 to 1 mg/dL     3) hypertension-monitor with low-dose amlodipine  Avoid hypotension  May need to tolerate slightly higher blood pressures     4) rib fractures-Per primary team     5) COVID-19 infection-Per primary team5)      6) hypophosphatemia-im see above     7) Hypokalemia- see above     8) cystitis - f/u urine culture  On antibiotics per primary team    SUBJECTIVE / 24H INTERVAL HISTORY:  Patient awake  Is attempting to answer questions but unclear answers is difficult to understand what the patient is saying  Appears comfortable  Blood pressure is 1 13-1 45 systolic    OBJECTIVE:  Current Weight: Weight - Scale: 56 6 kg (124 lb 12 5 oz)  Vitals:    12/08/22 1923 12/09/22 0456 12/09/22 0732 12/09/22 0946   BP:  146/70 145/98 123/71   Pulse:  68 67 74   Resp:    20   Temp:  97 5 °F (36 4 °C) 98 °F (36 7 °C) 97 8 °F (36 6 °C)   TempSrc:       SpO2: 96% 95% 95% 92%   Weight:       Height:           Intake/Output Summary (Last 24 hours) at 12/9/2022 1029  Last data filed at 12/9/2022 4269  Gross per 24 hour   Intake 2144 58 ml   Output --   Net 2144 58 ml     General: NAD, chronically ill-appearing  Skin: no rash  Eyes: anicteric sclera  ENT: Dry mucous membrane, multiple ecchymotic sites  Neck: supple  Chest: Diminished intake bases, no rhonchi  No wheezes    CVS: s1s2, no murmur, no gallop, no rub  Abdomen: soft, nontender, nl sounds  Extremities: no significant pitting edema LE b/l  : no araujo  Neuro: AAOX2  Psych: normal affect    Medications:    Current Facility-Administered Medications:   •  acetaminophen (TYLENOL) rectal suppository 650 mg, 650 mg, Rectal, Q4H PRN, Cailin Bellamy MD, 650 mg at 12/03/22 1147  •  albuterol (PROVENTIL HFA,VENTOLIN HFA) inhaler 2 puff, 2 puff, Inhalation, Q4H PRN, Rolo Benedict DO  •  amLODIPine (NORVASC) tablet 2 5 mg, 2 5 mg, Oral, Daily, Bridgetpatrizia Fenton MD, 2 5 mg at 12/09/22 8096  •  baricitinib (OLUMIANT) tablet 2 mg, 2 mg, Oral, Q24H, TheresaJANIS Hernandez, 2 mg at 12/06/22 1654  •  bimatoprost (LUMIGAN) 0 01 % ophthalmic solution 1 drop, 1 drop, Right Eye, HS, Fadumo Rush PA-C, 1 drop at 12/08/22 2345  •  cefTRIAXone (ROCEPHIN) 1,000 mg in dextrose 5 % 50 mL IVPB, 1,000 mg, Intravenous, Q24H, Rikki Nance PA-C, Last Rate: 100 mL/hr at 12/08/22 1337, 1,000 mg at 12/08/22 1337  •  dexamethasone (DECADRON) injection 6 mg, 6 mg, Intravenous, Q24H, Jj CarsonJANIS yeager, 6 mg at 12/09/22 3487  •  dextrose 5 % infusion, 40 mL/hr, Intravenous, Continuous, Morgan Mcdonough MD, Last Rate: 100 mL/hr at 12/09/22 0947, 100 mL/hr at 12/09/22 0947  •  docusate sodium (COLACE) capsule 100 mg, 100 mg, Oral, BID, Hillary Kay PA-C, 100 mg at 12/06/22 0809  •  enoxaparin (LOVENOX) subcutaneous injection 30 mg, 30 mg, Subcutaneous, Q12H [address], Amaya Mcfarland PA-C, 30 mg at 12/09/22 4795  •  hydrALAZINE (APRESOLINE) injection 5 mg, 5 mg, Intravenous, Q6H PRN, Alison Cushing, PA-C, 5 mg at 12/02/22 0232  •  insulin lispro (HumaLOG) 100 units/mL subcutaneous injection 1-5 Units, 1-5 Units, Subcutaneous, Q6H [address], 1 Units at 12/08/22 2346 **AND** Fingerstick Glucose (POCT), , , Q6H, iRkki Nance PA-C  •  labetalol (NORMODYNE) injection 10 mg, 10 mg, Intravenous, Q6H PRN, Alison Cushing, PA-C, 10 mg at 12/08/22 3856  •  lidocaine (LIDODERM) 5 % patch 1 patch, 1 patch, Topical, Daily, Alison Cushing, PA-C, 1 patch at 12/09/22 5090  •  multivitamin-minerals (CENTRUM) tablet 1 tablet, 1 tablet, Oral, Daily, Alison Cushing, PA-C, 1 tablet at 12/09/22 5061  •  OLANZapine (ZyPREXA ZYDIS) dispersible tablet 5 mg, 5 mg, Oral, HS, Alison Cushing, PA-C, 5 mg at 12/05/22 2143  •  OLANZapine (ZyPREXA) IM injection 2 5 mg, 2 5 mg, Intramuscular, Q6H PRN, Candice Ball MD, 2 5 mg at 12/06/22 2200  •  oxyCODONE (ROXICODONE) IR tablet 2 5 mg, 2 5 mg, Oral, Q4H PRN, Alison Cushing, PA-C  •  [COMPLETED] remdesivir Ashleyque Meigs) 200 mg in sodium chloride 0 9 % 290 mL IVPB, 200 mg, Intravenous, Q24H, 200 mg at 12/06/22 1653 **FOLLOWED BY** remdesivir (Veklury) 100 mg in sodium chloride 0 9 % 270 mL IVPB, 100 mg, Intravenous, Q24H, JANIS Lepe, Last Rate: 0 mL/hr at 12/07/22 2244, 100 mg at 12/08/22 1337  •  senna (SENOKOT) tablet 8 6 mg, 1 tablet, Oral, HS, Feli Jaime PA-C, 8 6 mg at 12/05/22 2148    Laboratory Results:  Results from last 7 days   Lab Units 12/09/22  0516 12/08/22  0605 12/07/22  0545 12/06/22  0450 12/05/22  1350 12/05/22  0452 12/03/22  0538   WBC Thousand/uL 13 10* 10 97* 7 60 7 17  --  7 81 7 40   HEMOGLOBIN g/dL 12 0 13 0 13 8 13 5  --  14 3 13 5   HEMATOCRIT % 37 0 40 7 43 1 40 1  --  41 4 39 5   PLATELETS Thousands/uL 334 316 313 279  -- 255 231   POTASSIUM mmol/L 3 0* 3 6 3 5 2 8* 3 1* 3 7 3 5   CHLORIDE mmol/L 112* 119* 122* 119* 119* 118* 114*   CO2 mmol/L 30 30 27 24 24 22 21   BUN mg/dL 31* 39* 43* 34* 36* 33* 23   CREATININE mg/dL 0 70 0 80 0 99 0 88 0 99 1 14 1 06   CALCIUM mg/dL 8 2* 8 7 9 3 8 8 9 0 9 2 8 3   MAGNESIUM mg/dL 2 2 2 5  --  2 8*  --   --   --    PHOSPHORUS mg/dL 1 7* 2 2*  --  1 0*  --   --   --

## 2022-12-09 NOTE — PLAN OF CARE
PT continues with poor po intake, took a few bites of po today and medications with applesauce, LOS 8 days, please obtain current weight  Continue to replete K and P  Provide encouragement and assistance at meals, continue supplements, monitor for food preferences  PT on pureed, NTL, will continue to monitor ST f/u for any potential diet upgrades  If patient continues with poor po intake will need to consider nutrition support  Problem: Nutrition/Hydration-ADULT  Goal: Nutrient/Hydration intake appropriate for improving, restoring or maintaining nutritional needs  Description: Monitor and assess patient's nutrition/hydration status for malnutrition  Collaborate with interdisciplinary team and initiate plan and interventions as ordered  Monitor patient's weight and dietary intake as ordered or per policy  Utilize nutrition screening tool and intervene as necessary  Determine patient's food preferences and provide high-protein, high-caloric foods as appropriate       INTERVENTIONS:  - Monitor oral intake, urinary output, labs, and treatment plans  - Assess nutrition and hydration status and recommend course of action  - Evaluate amount of meals eaten  - Assist patient with eating if necessary   - Allow adequate time for meals  - Recommend/ encourage appropriate diets, oral nutritional supplements, and vitamin/mineral supplements  - Order, calculate, and assess calorie counts as needed  - Recommend, monitor, and adjust tube feedings and TPN/PPN based on assessed needs  - Assess need for intravenous fluids  - Provide specific nutrition/hydration education as appropriate  - Include patient/family/caregiver in decisions related to nutrition  Outcome: Not Progressing

## 2022-12-09 NOTE — ASSESSMENT & PLAN NOTE
- SLP evaluation and recommendations  - Continue dysphagia 1 diet with pureed with nectar thick liquids  - VBS pending on 12/13 once off of COVID precautions  - Aspiration precautions  - Discussed goals of care with nephew, who is going to talk to the patient's   Patient is to remain level 3 DNR/ DNI but at this time, family is not ready to pursue hospice  As of 12/9, continue to evaluate for progress as she is improving clinically overall  VBS pending on 12/13, per nephew  Nallely Snider

## 2022-12-09 NOTE — ASSESSMENT & PLAN NOTE
- Pt on 8 L/min O2 mid-flow today, improving    - In the setting of left sided rib fractures, acute pneumonia from COVID-19 present on admission  - Dysphagia diet due to aspiration  - Moderate COVID-19 pathway  - Aggressive pulmonary hygiene  - Rib fx protocol  - Continue to discuss goals of care with nephew- as of 12/9, continue to evaluate for progress as she is improving clinically overall   VBS pending on 12/13; continue level 3 DNR/ DNI

## 2022-12-09 NOTE — ASSESSMENT & PLAN NOTE
- O2 as needed, currently requiring supplemental O2 and requirements have increased over the past 24 hrs    - Currently on 3 L O2 NC, improving  - Contact and airborne precautions   - Pulmonary toilet  - CXR 12/5 Showed small left effusion, No PTX, No focal consolidation   - Repeat CXR 12/7 indicates development of vascular congestion  - COVID Pathway initiated Moderate, treat for 10 days, through 12/12  - Monitor daily labs  - Contributory to acute respiratory failure

## 2022-12-09 NOTE — SPEECH THERAPY NOTE
Speech Language/Pathology    Speech/Language Pathology Progress Note    Patient Name: John Contreras  [id number] Date: 12/9/2022     Problem List  Principal Problem:    Fracture of multiple ribs of left side  Active Problems:    Hypertension    Ambulatory dysfunction    Dementia ([address] )    COVID    Hypernatremia    Acute respiratory failure (HCC)    UTI (urinary tract infection)    Dysphagia       Past Medical History  Past Medical History:   Diagnosis Date   • Anosmia    • Cellulitis    • Diverticulitis, colon    • Fatigue 5/15/2017   • Generalized osteoarthritis of multiple sites    • Hyperlipidemia    • Hypertension    • Impacted cerumen of both ears    • Impaired fasting glucose 7/4/2012   • Lyme disease    • Macular degeneration    • Tremor         Past Surgical History  Past Surgical History:   Procedure Laterality Date   • BREAST BIOPSY     • CATARACT EXTRACTION     • HYSTERECTOMY           Subjective:  "I'm looking to go home tomorrow" Patient is awake and alert  Objective:  Patient positioned upright in bed with pillow supporting head  She is seen for dysphagia therapy  RN and medical team concerned with patient's swallow function, and lack of PO intake  Oral care is provided with oral swabs and suction kit  Oral cavity is clear and dry  The patient is agreeable to pudding with honey thick and nectar thick liquids  She takes all via tsp and has good oral closure  Transfer appears min prolonged  Suspect swallow initiation time is delayed  Laryngeal rise is palpated and appears adequate  The patient is observed with cough x2 on pudding, but no additional s/s aspiration observed  O2 remains stable throughout at 93% on 3 L O2  The patient has approx 4 oz pudding and 2 oz honey and nectar thick liquids  She tolerates well, but refuses additional intake  Assessment:  Patient has cough x2 with pudding  Patient may be having more difficulty with thicker puree items   Consider downgrade to full liquid nectar thick and assess tolerance  Plan/Recommendations:  Consider downgrade to full liquid nectar thick  Feed patient all on tsp  Will complete VBS once off Covid precautions   Continue ST

## 2022-12-10 LAB
ANION GAP SERPL CALCULATED.3IONS-SCNC: 6 MMOL/L (ref 4–13)
ANION GAP SERPL CALCULATED.3IONS-SCNC: 8 MMOL/L (ref 4–13)
BACTERIA UR CULT: ABNORMAL
BACTERIA UR CULT: ABNORMAL
BASOPHILS # BLD MANUAL: 0 THOUSAND/UL (ref 0–0.1)
BASOPHILS NFR MAR MANUAL: 0 % (ref 0–1)
BUN SERPL-MCNC: 26 MG/DL (ref 5–25)
BUN SERPL-MCNC: 28 MG/DL (ref 5–25)
CALCIUM SERPL-MCNC: 7.8 MG/DL (ref 8.3–10.1)
CALCIUM SERPL-MCNC: 8.3 MG/DL (ref 8.3–10.1)
CHLORIDE SERPL-SCNC: 112 MMOL/L (ref 96–108)
CHLORIDE SERPL-SCNC: 114 MMOL/L (ref 96–108)
CO2 SERPL-SCNC: 23 MMOL/L (ref 21–32)
CO2 SERPL-SCNC: 27 MMOL/L (ref 21–32)
CREAT SERPL-MCNC: 0.72 MG/DL (ref 0.6–1.3)
CREAT SERPL-MCNC: 0.77 MG/DL (ref 0.6–1.3)
EOSINOPHIL # BLD MANUAL: 0 THOUSAND/UL (ref 0–0.4)
EOSINOPHIL NFR BLD MANUAL: 0 % (ref 0–6)
ERYTHROCYTE [DISTWIDTH] IN BLOOD BY AUTOMATED COUNT: 12.7 % (ref 11.6–15.1)
GFR SERPL CREATININE-BSD FRML MDRD: 65 ML/MIN/1.73SQ M
GFR SERPL CREATININE-BSD FRML MDRD: 70 ML/MIN/1.73SQ M
GLUCOSE SERPL-MCNC: 124 MG/DL (ref 65–140)
GLUCOSE SERPL-MCNC: 160 MG/DL (ref 65–140)
GLUCOSE SERPL-MCNC: 181 MG/DL (ref 65–140)
GLUCOSE SERPL-MCNC: 92 MG/DL (ref 65–140)
GLUCOSE SERPL-MCNC: 95 MG/DL (ref 65–140)
GLUCOSE SERPL-MCNC: 99 MG/DL (ref 65–140)
HCT VFR BLD AUTO: 41.2 % (ref 34.8–46.1)
HGB BLD-MCNC: 13.8 G/DL (ref 11.5–15.4)
LYMPHOCYTES # BLD AUTO: 1.17 THOUSAND/UL (ref 0.6–4.47)
LYMPHOCYTES # BLD AUTO: 10 % (ref 14–44)
MCH RBC QN AUTO: 32.8 PG (ref 26.8–34.3)
MCHC RBC AUTO-ENTMCNC: 33.5 G/DL (ref 31.4–37.4)
MCV RBC AUTO: 98 FL (ref 82–98)
MONOCYTES # BLD AUTO: 0.94 THOUSAND/UL (ref 0–1.22)
MONOCYTES NFR BLD: 8 % (ref 4–12)
NEUTROPHILS # BLD MANUAL: 9.62 THOUSAND/UL (ref 1.85–7.62)
NEUTS BAND NFR BLD MANUAL: 4 % (ref 0–8)
NEUTS SEG NFR BLD AUTO: 78 % (ref 43–75)
PHOSPHATE SERPL-MCNC: 2.6 MG/DL (ref 2.3–4.1)
PLATELET # BLD AUTO: 383 THOUSANDS/UL (ref 149–390)
PLATELET BLD QL SMEAR: ADEQUATE
PMV BLD AUTO: 9.8 FL (ref 8.9–12.7)
POLYCHROMASIA BLD QL SMEAR: PRESENT
POTASSIUM SERPL-SCNC: 3.3 MMOL/L (ref 3.5–5.3)
POTASSIUM SERPL-SCNC: 4.4 MMOL/L (ref 3.5–5.3)
RBC # BLD AUTO: 4.21 MILLION/UL (ref 3.81–5.12)
RBC MORPH BLD: PRESENT
SODIUM SERPL-SCNC: 143 MMOL/L (ref 135–147)
SODIUM SERPL-SCNC: 147 MMOL/L (ref 135–147)
WBC # BLD AUTO: 11.73 THOUSAND/UL (ref 4.31–10.16)

## 2022-12-10 RX ORDER — POTASSIUM CHLORIDE 20MEQ/15ML
20 LIQUID (ML) ORAL ONCE
Status: COMPLETED | OUTPATIENT
Start: 2022-12-10 | End: 2022-12-10

## 2022-12-10 RX ADMIN — ENOXAPARIN SODIUM 30 MG: 30 INJECTION SUBCUTANEOUS at 09:47

## 2022-12-10 RX ADMIN — DEXTROSE 50 ML/HR: 5 SOLUTION INTRAVENOUS at 02:52

## 2022-12-10 RX ADMIN — OLANZAPINE 5 MG: 5 TABLET, ORALLY DISINTEGRATING ORAL at 22:04

## 2022-12-10 RX ADMIN — BARICITINIB 2 MG: 2 TABLET, FILM COATED ORAL at 10:05

## 2022-12-10 RX ADMIN — ENOXAPARIN SODIUM 30 MG: 30 INJECTION SUBCUTANEOUS at 22:04

## 2022-12-10 RX ADMIN — POTASSIUM CHLORIDE: 2 INJECTION, SOLUTION, CONCENTRATE INTRAVENOUS at 12:22

## 2022-12-10 RX ADMIN — INSULIN LISPRO 1 UNITS: 100 INJECTION, SOLUTION INTRAVENOUS; SUBCUTANEOUS at 17:58

## 2022-12-10 RX ADMIN — BIMATOPROST 1 DROP: 0.1 SOLUTION/ DROPS OPHTHALMIC at 22:04

## 2022-12-10 RX ADMIN — Medication 1 TABLET: at 09:48

## 2022-12-10 RX ADMIN — DOCUSATE SODIUM 100 MG: 100 CAPSULE, LIQUID FILLED ORAL at 17:04

## 2022-12-10 RX ADMIN — AMLODIPINE BESYLATE 2.5 MG: 2.5 TABLET ORAL at 09:47

## 2022-12-10 RX ADMIN — REMDESIVIR 100 MG: 100 INJECTION, POWDER, LYOPHILIZED, FOR SOLUTION INTRAVENOUS at 10:36

## 2022-12-10 RX ADMIN — DEXAMETHASONE SODIUM PHOSPHATE 6 MG: 4 INJECTION INTRA-ARTICULAR; INTRALESIONAL; INTRAMUSCULAR; INTRAVENOUS; SOFT TISSUE at 09:47

## 2022-12-10 RX ADMIN — SENNOSIDES 8.6 MG: 8.6 TABLET, FILM COATED ORAL at 22:04

## 2022-12-10 RX ADMIN — LIDOCAINE 5% 1 PATCH: 700 PATCH TOPICAL at 09:48

## 2022-12-10 RX ADMIN — POTASSIUM CHLORIDE 20 MEQ: 20 SOLUTION ORAL at 10:43

## 2022-12-10 NOTE — PROGRESS NOTES
Follow up Consultation    Nephrology   Wai Moreno 80 y o  female MRN: [id number]  Unit/Bed#: [facility] 604-01 Encounter: [id number]      Physician Requesting Consult: Alexandria Lopes DO        ASSESSMENT/PLAN:  51-year-old female multiple comorbidities including hypertension, hyperlipidemia, osteoarthritis, dementia admitted status post fall on 12/2/2022 through the course of the hospital stay found to have multiple rib fractures and positive for COVID  Nephrology has been consulted for evaluation management of hypernatremia         Acute hypernatremia/CKD stage III:  At risk for ZECHARIAH multifactorial most likely secondary to prerenal azotemia  After review of records [facility] as well as Care everywhere it appears that the patient has a baseline Creatinine of 0 9-1 1 mg/dL  patient's creatinine today is at 0 99 mg/dL  Patient admitted with serum sodium 140 mEq  Most recent serum sodium 147 mEq  Likely secondary to decreased p o  intake  Change IV fluids to D5 water +20 mEq KCl at 90 cc an hour  check BMP in a m  Optimize hemodynamic status to avoid delay in renal recovery  Place on a renal diet when allowed diet order  Avoid nephrotoxins, adjust meds to appropriate GFR  Strict I/O  Daily weights  Urinary retention protocol if patient does not have a James  Most likely has underlying CKD secondary to age-related nephron loss plus hypertensive sclerosis  will need to set up patient for follow up with Nephrology as an outpatient post hospitalization  as an outpatient for nephrology patient follows up with no nephrologist  Tried calling patient's nephew again today whose number was listed in the chart unable to get a hold  We will try again later     Blood pressure/normotensive:  current medications: Norvasc 2 5 mg p o  daily  recommendations: Adjusted hold parameters to hold for SBP less than 130  Optimize hemodynamics    Maintain MAP > 65mmHg  Avoid BP fluctuations      H/H/anemia:  most recent hemoglobin at 13 8 g/dL  maintain hemoglobin greater than 8 grams/deciliter     Acid-base electrolytes:  Electrolytes:       Hypernatremia:  Most recent sodium at 147 mEq  See above     Hypokalemia:  Most recent potassium 3 0 mEq  Will add potassium to IV fluids     Hypophosphatemia:  Most recent phosphorus 2 6  Recheck phosphorus level  Solved     Hypermagnesemia:  Most recent magnesium 2 8  Avoid further supplementation     Acid-base:    Most recent bicarb of 27, alkalosis likely secondary to alkalosis        Other medical problems:  Multiple rib fractures status post fall:  Management per primary team   Follow-up with trauma  COVID infection:  Management per primary team   On isolation  Tested + 2022  Follow-up with primary team in terms of goals  On Rocephin, Decadron, baricitinib  Unsure how long patient can stay on IV fluids  High risk of developing hyponatremia again when IV fluids discontinued  Thanks for the consult  Will continue to follow  Please call with questions/ concerns  Above-mentioned orders and Plan in terms of acute hyponatremia was discussed with the team in depth with Tiger text    Cheyenne Altamirano MD, [facility], 12/10/2022, 6:07 AM              Objective :   Patient seen and examined in her room with full PPE in place remains afebrile blood pressure stable urine output 237 cc plus  As per nursing staff patient slightly more interactive overnight  Remains on D5 water at 100 cc an hour for now  PHYSICAL EXAM  /70   Pulse 66   Temp 98 2 °F (36 8 °C)   Resp 16   Ht 4' 10" (1 473 m)   Wt 56 6 kg (124 lb 12 5 oz)   SpO2 96%   BMI 26 08 kg/m²   Temp (24hrs), Av 8 °F (36 6 °C), Min:97 3 °F (36 3 °C), Max:98 2 °F (36 8 °C)        Intake/Output Summary (Last 24 hours) at 12/10/2022 0607  Last data filed at 12/10/2022 0201  Gross per 24 hour   Intake 848 34 ml   Output 227 ml   Net 621 34 ml       I/O last 24 hours: In:  3 [P O :140;  I V :1688 3; IV Piggyback:150]  Out: 227 [Urine:227]      Current Weight: Weight - Scale: 56 6 kg (124 lb 12 5 oz)  First Weight: Weight - Scale: 56 6 kg (124 lb 12 5 oz)  Physical Exam  Vitals and nursing note reviewed  Constitutional:       General: She is not in acute distress  Appearance: Normal appearance  She is ill-appearing  She is not toxic-appearing or diaphoretic  HENT:      Head: Normocephalic and atraumatic  Mouth/Throat:      Mouth: Mucous membranes are moist       Pharynx: Oropharynx is clear  No oropharyngeal exudate  Eyes:      General: No scleral icterus  Conjunctiva/sclera: Conjunctivae normal    Cardiovascular:      Pulses: Normal pulses  Pulmonary:      Effort: No respiratory distress  Breath sounds: No stridor  Comments: Coarse breath sounds  Abdominal:      General: There is no distension  Palpations: Abdomen is soft  There is no mass  Tenderness: There is no abdominal tenderness  Musculoskeletal:         General: No swelling  Cervical back: Normal range of motion and neck supple  No rigidity  Skin:     General: Skin is warm  Coloration: Skin is not jaundiced  Neurological:      General: No focal deficit present  Mental Status: She is alert  She is disoriented  Psychiatric:         Mood and Affect: Mood normal              Review of Systems   Constitutional: Positive for fatigue  Negative for chills  HENT: Negative for congestion  Respiratory: Positive for cough  Negative for shortness of breath and wheezing  Cardiovascular: Negative for leg swelling  Gastrointestinal: Negative for abdominal pain and diarrhea  Musculoskeletal: Negative for back pain  Skin: Negative for rash  Neurological: Negative for headaches  Psychiatric/Behavioral: Positive for confusion  Negative for agitation  All other systems reviewed and are negative        Scheduled Meds:  Current Facility-Administered Medications   Medication Dose Route Frequency Provider Last Rate   • acetaminophen  650 mg Rectal Q4H PRN Meme Li MD     • albuterol  2 puff Inhalation Q4H PRN Carole Shi DO     • amLODIPine  2 5 mg Oral Daily Bridgetpatrizia Pedroza MD     • baricitinib  2 mg Oral Q24H JANIS Alcantar     • bimatoprost  1 drop Right Eye HS Tiana Davis PA-C     • cefTRIAXone  1,000 mg Intravenous Q24H Agata Calzada PA-C Stopped (12/10/22 0201)   • dexamethasone  6 mg Intravenous Q24H JANIS Alcantar     • dextrose  50 mL/hr Intravenous Continuous Tim Covington MD 50 mL/hr (12/10/22 0252)   • docusate sodium  100 mg Oral BID Pauline PATY Waters     • enoxaparin  30 mg Subcutaneous Q12H [address] Amaya Munguia PA-C     • hydrALAZINE  5 mg Intravenous Q6H PRN Tiana Davis PA-C     • insulin lispro  1-5 Units Subcutaneous Q6H [address] Rikki Nance PA-C     • labetalol  10 mg Intravenous Q6H PRN Tiana Davis PA-C     • lidocaine  1 patch Topical Daily Tiana Davis PA-C     • multivitamin-minerals  1 tablet Oral Daily Tiana Davis PA-C     • OLANZapine  5 mg Oral HS Tiana Davis PA-C     • OLANZapine  2 5 mg Intramuscular Q6H PRN Meme Li MD     • oxyCODONE  2 5 mg Oral Q4H PRN Tiana Davis PA-C     • remdesivir  100 mg Intravenous Q24H JANIS Alcantar Stopped (12/10/22 0201)   • senna  1 tablet Oral HS Pauline PATY Waters         PRN Meds: •  acetaminophen  •  albuterol  •  hydrALAZINE  •  labetalol  •  OLANZapine  •  oxyCODONE    Continuous Infusions:dextrose, 50 mL/hr, Last Rate: 50 mL/hr (12/10/22 0252)          Invasive Devices:      Invasive Devices     Peripheral Intravenous Line  Duration           Peripheral IV 12/10/22 Left;Upper;Ventral (anterior) Arm <1 day                  LABORATORY:    Results from last 7 days   Lab Units 12/09/22  0516 12/08/22  0605 12/07/22  0545 12/06/22  0450 12/05/22  1350 12/05/22  0452   WBC Thousand/uL 13 10* 10 97* 7 60 7 17  --  7 81 HEMOGLOBIN g/dL 12 0 13 0 13 8 13 5  --  14 3   HEMATOCRIT % 37 0 40 7 43 1 40 1  --  41 4   PLATELETS Thousands/uL 334 316 313 279  --  255   POTASSIUM mmol/L 3 0* 3 6 3 5 2 8* 3 1* 3 7   CHLORIDE mmol/L 112* 119* 122* 119* 119* 118*   CO2 mmol/L 30 30 27 24 24 22   BUN mg/dL 31* 39* 43* 34* 36* 33*   CREATININE mg/dL 0 70 0 80 0 99 0 88 0 99 1 14   CALCIUM mg/dL 8 2* 8 7 9 3 8 8 9 0 9 2   MAGNESIUM mg/dL 2 2 2 5  --  2 8*  --   --    PHOSPHORUS mg/dL 1 7* 2 2*  --  1 0*  --   --       rest all reviewed    RADIOLOGY:  XR chest portable   Final Result by Ramon Phan MD (12/07 1212)      Development of mild vascular congestion      No focal pulmonary masses or infiltrates                  Workstation performed: [id number]         XR chest portable   Final Result by Reyes Grass, MD (12/05 1257)   Questionable small left effusion  No focal consolidation  No pneumothorax  Workstation performed: [id number]         TRAUMA - CT head wo contrast   Final Result by Ulises Coronado DO (12/01 1354)   Addendum (preliminary) 1 of 1 by Ulises Coronado DO (12/01 1354)   ADDENDUM:      I personally discussed this study with Beulah Han 21 on 12/1/2022    at 1:22 PM          Final   1  No CT evidence of acute intracranial process  2   Soft tissue laceration of the left superolateral periorbital soft tissues with surrounding swelling extending into the left facial soft tissues  Superior scalp hematoma is also noted  Superior scalp hematoma is also noted  3   Age-related volume loss with patchy areas of hypoattenuation within the periventricular and subcortical white matter which are likely the basis of chronic microangiopathy              Workstation performed: [id number]         TRAUMA - CT spine cervical wo contrast   Final Result by Ulises Coronado DO (12/01 1354)   Addendum (preliminary) 1 of 1 by Ulises Coronado DO (12/01 1354)   ADDENDUM:      I personally discussed this study with Beulah Han 21 on 12/1/2022    at 1:22 PM          Final   1  No evidence of acute cervical spine fracture or traumatic malalignment  Workstation performed: [id number]         TRAUMA - CT chest abdomen pelvis w contrast   Final Result by Marlo Herzog DO (12/01 1404)   Addendum (preliminary) 1 of 1 by Marlo Herzog DO (12/01 1404)   ADDENDUM:      The hypodensity within the spleen described in impression #6 is    retrospectively present on prior CT chest of December 2017 and likely    represents a small hemangioma or splenic cyst             Final   1  Multiple mildly displaced/nondisplaced fractures involving the anterolateral aspects of ribs 3 through 7 on the left  No CT evidence of acute traumatic sequelae within elsewhere within the chest, abdomen, or pelvis  2   Soft tissue lesion within the right kidney measuring 2 5 cm, highly concerning for renal carcinoma  Urology evaluation is recommended  3   Groundglass changes within the superior segment of the right lower lobe measuring 2 6 x 1 1 cm, suspicious for developing pneumonia  Follow-up chest CT is recommended in 2-3 months to monitor for resolution given the additional findings detailed in    impression #2  There are multiple bilateral pulmonary nodules measuring up to 9 mm which appear stable from prior CT of 2017  4   Colonic diverticulosis without evidence of acute diverticulitis  5   The rectum is moderately distended with fecal material measuring up to 6 5 cm in greatest diameter  No evidence of rectal wall thickening or perirectal inflammatory changes  6   Subcentimeter hypodensity within the spleen is too small to accurately characterize and is of indeterminate clinical sulcus  This can be evaluated with a nonemergent ultrasound as clinically warranted        I personally discussed this study with Beulah Han 21 on 12/1/2022 at 1:22 PM  Workstation performed: [id number]         XR Trauma multiple (SLB/SLRA trauma bay ONLY)   Final Result by Federica Tripathi MD (12/01 1205)      No acute cardiopulmonary disease within limitations of supine imaging  Workstation performed: [id number]         XR chest 1 view   Final Result by Federica Tripathi MD (12/01 1407)      No acute cardiopulmonary disease within limitations of supine imaging  Workstation performed: [id number]           Rest all reviewed    Portions of the record may have been created with voice recognition software  Occasional wrong word or "sound a like" substitutions may have occurred due to the inherent limitations of voice recognition software  Read the chart carefully and recognize, using context, where substitutions have occurred  If you have any questions, please contact the dictating provider

## 2022-12-10 NOTE — PROGRESS NOTES
[address] [facility]  Progress Note - Amilcar Yu 3/7/1926, 80 y o  female MRN: [id number]  Unit/Bed#: [facility] 604-01 Encounter: [id number]  Primary Care Provider: Emmanuel Garcia MD   Date and time admitted to hospital: 12/1/2022 11:28 AM    Dysphagia  Assessment & Plan  - SLP evaluation and recommendations  - Continue dysphagia 1 diet with pureed with nectar thick liquids  - VBS pending on 12/13 once off of COVID precautions  - Aspiration precautions  - Discussed goals of care with nephew, who is going to talk to the patient's   Patient is to remain level 3 DNR/ DNI but at this time, family is not ready to pursue hospice  As of 12/9, continue to evaluate for progress as she is improving clinically overall  VBS pending on 12/13, per nephew  UTI (urinary tract infection)  Assessment & Plan  - 12/7 UA indicating UTI  -Pleated 3-day course of Rocephin-mental status has been reported as improving  -WBC count downtrending     -Continue to monitor for fever or signs of sepsis  - Urinary retention protocol    Acute respiratory failure (HCC)  Assessment & Plan  - In the setting of left sided rib fractures, acute pneumonia from COVID-19 present on admission  -Oxygen requirements continue to improve-3 L today down from 8 L   - Dysphagia diet due to aspiration  - Moderate COVID-19 pathway  - Aggressive pulmonary hygiene  - Rib fx protocol  - Continue to discuss goals of care with nephew- as of 12/9, continue to evaluate for progress as she is improving clinically overall  VBS pending on 12/13; continue level 3 DNR/ DNI    Hypernatremia  Assessment & Plan  - Hypernatremia  - Na 147 today, stable  - Appreciate Nephrology consult and recommendations  - Continue D5W IV fluids--increased today to 90 mL/HR  - Urine studies completed  - Evidence of dehydration due to poor PO intake/ aspiration  - Discussed goals of care with nephew, who is going to talk to the patient's    Patient is to remain level 3 DNR/ DNI but at this time, family is not ready to pursue hospice  As of 12/9, continue to evaluate for progress as she is improving clinically overall  VBS pending on 12/13, per nephew  COVID  Assessment & Plan  - O2 as needed, currently requiring supplemental O2 and requirements have decreased over the past 24 hrs  - Currently on 3 L O2 NC, improving  - Contact and airborne precautions   - Pulmonary toilet  - CXR 12/5 Showed small left effusion, No PTX, No focal consolidation   - Repeat CXR 12/7 indicates development of vascular congestion  - COVID Pathway initiated Moderate, treat for 10 days, through 12/12  - Monitor daily labs  - Contributory to acute respiratory failure    Dementia [address] [address] )  Assessment & Plan  - Geriatrics consult - recs appreciated  - GCS 14 ([id number]) disoriented to time and place  - Restraints secondary to impulsivity  - Re- Directable, follows commands  - no behavioral disturbances currently  - decrease in function since admission, cannot return to living at home per Geriatrics  - Discussed goals of care with nephew, who is going to talk to the patient's   Patient is to remain level 3 DNR/ DNI but at this time, family is not ready to pursue hospice  As of 12/9, continue to evaluate for progress as she is improving clinically overall  VBS pending on 12/13, per nephew  Ambulatory dysfunction  Assessment & Plan  - PT/OT seen patient  - Rehab is recommended    Hypertension  Assessment & Plan  -Sinew on amlodipine  - continue IV hydralazine, labetalol if parameters met--blood pressure has been controlled without use of PRNs    * Fracture of multiple ribs of left side  Assessment & Plan  - Left 3-7 rib fractures, present on admission  - S/p fall  - Rib Fracture Protocol   - Pain Control  - Incentive spirometer--patient displays ability to inspire 300 mL, though difficult to assess due to patient's mental status    - Mouth breather and dries out quickly, oral care frequently  - Suction at bedside for productive cough  - Contributing to acute respiratory failure        Bowel Regimen: Colace  VTE Prophylaxis:Sequential compression device (Venodyne)  and Enoxaparin (Lovenox)     Disposition: An extensive conversation with patient's nephew, Cachorro Johnson  He confirms that he continues to want to pursue treatment-level 3 code  No hospice at this time  Planning to have a video barium swallow completed on 12/12--continue to encourage adequate nutrition in the meantime and monitor electrolytes  Subjective   Chief Complaint: "I'm fine"    Subjective: Patient denies any complaints this morning  She denies any pain or discomfort or difficulty breathing  ROS is limited due to patient's mental status  Objective   Vitals:   Temp:  [96 9 °F (36 1 °C)-98 7 °F (37 1 °C)] 98 7 °F (37 1 °C)  HR:  [65-81] 81  Resp:  [16] 16  BP: (153-170)/(61-79) 153/61    I/O       12/08 0701  12/09 0700 12/09 0701  12/10 0700 12/10 0701  12/11 0700    P  O  0 140     I V  (mL/kg) 2274 6 (40 2) 558 3 (9 9) 430 8 (7 6)    IV Piggyback 320 150 270    Total Intake(mL/kg) 2594 6 (45 8) 848 3 (15) 700 8 (12 4)    Urine (mL/kg/hr)  227 (0 2)     Stool  0     Total Output  227     Net +2594 6 +621 3 +700 8           Unmeasured Urine Occurrence 9 x 8 x     Unmeasured Stool Occurrence 0 x 0 x 1 x           Physical Exam:   GENERAL APPEARANCE: NAD  NEURO: GCS is 14, due to confusion  Patient is oriented to person, general place "hospital", and year "2-0-2-2"  She displays difficulty with hearing and comprehension  HEENT: Abrasion noted on left eyebrow-scabbed  Neck is supple  CV: RRR, 2 radial and dorsalis pedis pulses, bilaterally  LUNGS: Scant crackles audible in left lower  Overall clear  Patient is on 3 L nasal cannula  No orthopnea  No tachypnea  GI: Soft, NT/ND  : Pelvis stable  Bladder is nontender  MSK: No deformities  Ranging all extremities  SKIN: Warm and dry      Invasive Devices Peripheral Intravenous Line  Duration           Peripheral IV 12/10/22 Left;Upper;Ventral (anterior) Arm <1 day                 PIC Score  PIC Pain Score: 3 (12/10/2022 12:00 PM)  PIC Incentive Spirometry Score: 2 (12/10/2022 12:00 PM)  PIC Cough Description: 2 (12/10/2022 12:00 PM)  PIC Total Score: 7 (12/10/2022 12:00 PM)       If the Total PIC Score </=5, did you consult APS and evaluate patient for further intervention?: yes      Pain:    Incentive Spirometry  Cough  3 = Controlled  4 = Above goal volume 3 = Strong  2 = Moderate  3 = Goal to alert volume 2 = Weak  1 = Severe  2 = Below alert volume 1 = Absent     1 = Unable to perform IS         Lab Results:   Results: I have personally reviewed all pertinent laboratory/tests results, BMP/CMP:   Lab Results   Component Value Date    SODIUM 147 12/10/2022    K 3 3 (L) 12/10/2022     (H) 12/10/2022    CO2 27 12/10/2022    BUN 26 (H) 12/10/2022    CREATININE 0 72 12/10/2022    CALCIUM 8 3 12/10/2022    EGFR 70 12/10/2022    and CBC:   Lab Results   Component Value Date    WBC 11 73 (H) 12/10/2022    HGB 13 8 12/10/2022    HCT 41 2 12/10/2022    MCV 98 12/10/2022     12/10/2022    MCH 32 8 12/10/2022    MCHC 33 5 12/10/2022    RDW 12 7 12/10/2022    MPV 9 8 12/10/2022     Imaging: I have personally reviewed pertinent reports       Other Studies: none

## 2022-12-10 NOTE — ASSESSMENT & PLAN NOTE
- Left 3-7 rib fractures, present on admission  - S/p fall  - Rib Fracture Protocol   - Pain Control  - Incentive spirometer--patient displays ability to inspire 300 mL, though difficult to assess due to patient's mental status    - Mouth breather and dries out quickly, oral care frequently  - Suction at bedside for productive cough  - Contributing to acute respiratory failure

## 2022-12-10 NOTE — ASSESSMENT & PLAN NOTE
- SLP evaluation and recommendations  - Continue dysphagia 1 diet with pureed with nectar thick liquids  - VBS pending on 12/13 once off of COVID precautions  - Aspiration precautions  - Discussed goals of care with nephew, who is going to talk to the patient's   Patient is to remain level 3 DNR/ DNI but at this time, family is not ready to pursue hospice  As of 12/9, continue to evaluate for progress as she is improving clinically overall  VBS pending on 12/13, per nephew

## 2022-12-10 NOTE — ASSESSMENT & PLAN NOTE
-Sinew on amlodipine  - continue IV hydralazine, labetalol if parameters met--blood pressure has been controlled without use of PRNs

## 2022-12-10 NOTE — PHYSICAL THERAPY NOTE
Physical Therapy Progress Note     12/10/22 0840   PT Last Visit   PT Visit Date 12/10/22   Note Type   Note Type Treatment   Pain Assessment   Pain Assessment Tool 0-10   Pain Score No Pain   Restrictions/Precautions   Other Precautions Cognitive; Bed Alarm; Chair Alarm; Restraints; Fall Risk;Pain;O2  (Alarm active and bilateral wrist restraints secured post session )   Subjective   Subjective The patient notes fatigue, but she is agreeable to work with therapy  Bed Mobility   Supine to Sit 2  Maximal assistance   Additional items Increased time required;Verbal cues;LE management;Assist x 1;Assist x 2   Transfers   Sit to Stand 3  Moderate assistance   Additional items Assist x 2; Increased time required;Verbal cues   Stand to Sit 3  Moderate assistance   Additional items Assist x 2; Increased time required;Verbal cues   Stand pivot 3  Moderate assistance   Additional items Assist x 2; Increased time required;Verbal cues   Balance   Static Sitting Poor +   Dynamic Sitting Poor   Static Standing Poor -   Ambulatory Poor -   Activity Tolerance   Activity Tolerance Patient tolerated treatment well;Patient limited by fatigue   Nurse [id number] [place] [address],[facility]  [id number], RN  Assessment   Prognosis Fair   Problem List Decreased strength;Decreased range of motion;Decreased endurance; Impaired balance;Decreased mobility; Decreased coordination;Decreased cognition; Impaired judgement;Decreased safety awareness;Pain   Assessment The patient is demonstrating improvement in alertness, strength, and balance  She continues to remain significantly limited from her baseline however  One-step instructions as well as continued redirection to task are necessary throughout the session  She required less assistance to transfer out to the chair today, but she does fatigue easily  The patient was in the chair with the alarm active post session with restraints secured     Barriers to Discharge Inaccessible home environment;Decreased caregiver support   Goals Patient Goals Pt  did not express  STG Expiration Date 12/14/22   PT Treatment Day 3   Plan   Treatment/Interventions Functional transfer training;LE strengthening/ROM; Therapeutic exercise; Endurance training;Cognitive reorientation;Patient/family training;Bed mobility;Gait training   Progress Slow progress, decreased activity tolerance   PT Frequency   (3-5x a week )   Recommendation   PT Discharge Recommendation Post acute rehabilitation services   AM-PAC Basic Mobility Inpatient   Turning in Bed Without Bedrails 2   Lying on Back to Sitting on Edge of Flat Bed 1   Moving Bed to Chair 1   Standing Up From Chair 1   Walk in Room 1   Climb 3-5 Stairs 1   Basic Mobility Inpatient Raw Score 7   Turning Head Towards Sound 4   Follow Simple Instructions 3   Low Function Basic Mobility Raw Score 14   Low Function Basic Mobility Standardized Score 22 01   Highest Level Of Mobility   JH-HLM Goal 2: Bed activities/Dependent transfer   JH-HLM Achieved 5: Stand (1 or more minutes)       An AM-PAC Basic Mobility standardized score less than 40 78 suggests the patient may benefit from discharge to post-acute rehab services      Zenon Lee, PTA

## 2022-12-10 NOTE — PLAN OF CARE
Problem: Prexisting or High Potential for Compromised Skin Integrity  Goal: Skin integrity is maintained or improved  Description: INTERVENTIONS:  - Identify patients at risk for skin breakdown  - Assess and monitor skin integrity  - Assess and monitor nutrition and hydration status  - Monitor labs   - Assess for incontinence   - Turn and reposition patient  - Assist with mobility/ambulation  - Relieve pressure over bony prominences  - Avoid friction and shearing  - Provide appropriate hygiene as needed including keeping skin clean and dry  - Evaluate need for skin moisturizer/barrier cream  - Collaborate with interdisciplinary team   - Patient/family teaching  - Consider wound care consult   Outcome: Progressing     Problem: Potential for Falls  Goal: Patient will remain free of falls  Description: INTERVENTIONS:  - Educate patient/family on patient safety including physical limitations  - Instruct patient to call for assistance with activity   - Consult OT/PT to assist with strengthening/mobility   - Keep Call bell within reach  - Keep bed low and locked with side rails adjusted as appropriate  - Keep care items and personal belongings within reach  - Initiate and maintain comfort rounds  - Make Fall Risk Sign visible to staff  - Offer Toileting every 1 Hours, in advance of need  - Initiate/Maintain alarm  - Obtain necessary fall risk management equipment  - Apply yellow socks and bracelet for high fall risk patients  - Consider moving patient to room near nurses station  Outcome: Progressing     Problem: MOBILITY - ADULT  Goal: Maintain or return to baseline ADL function  Description: INTERVENTIONS:  -  Assess patient's ability to carry out ADLs; assess patient's baseline for ADL function and identify physical deficits which impact ability to perform ADLs (bathing, care of mouth/teeth, toileting, grooming, dressing, etc )  - Assess/evaluate cause of self-care deficits   - Assess range of motion  - Assess patient's mobility; develop plan if impaired  - Assess patient's need for assistive devices and provide as appropriate  - Encourage maximum independence but intervene and supervise when necessary  - Involve family in performance of ADLs  - Assess for home care needs following discharge   - Consider OT consult to assist with ADL evaluation and planning for discharge  - Provide patient education as appropriate  Outcome: Progressing  Goal: Maintains/Returns to pre admission functional level  Description: INTERVENTIONS:  - Perform BMAT or MOVE assessment daily    - Set and communicate daily mobility goal to care team and patient/family/caregiver  - Collaborate with rehabilitation services on mobility goals if consulted  - Perform Range of Motion 3 times a day  - Reposition patient every 2 hours    - Dangle patient 3 times a day  - Stand patient 3 times a day  - Ambulate patient 3 times a day  - Out of bed to chair 3 times a day   - Out of bed for meals 3 times a day  - Out of bed for toileting  - Record patient progress and toleration of activity level   Outcome: Progressing     Problem: PAIN - ADULT  Goal: Verbalizes/displays adequate comfort level or baseline comfort level  Description: Interventions:  - Encourage patient to monitor pain and request assistance  - Assess pain using appropriate pain scale  - Administer analgesics based on type and severity of pain and evaluate response  - Implement non-pharmacological measures as appropriate and evaluate response  - Consider cultural and social influences on pain and pain management  - Notify physician/advanced practitioner if interventions unsuccessful or patient reports new pain  Outcome: Progressing     Problem: INFECTION - ADULT  Goal: Absence or prevention of progression during hospitalization  Description: INTERVENTIONS:  - Assess and monitor for signs and symptoms of infection  - Monitor lab/diagnostic results  - Monitor all insertion sites, i e  indwelling lines, tubes, and drains  - Monitor endotracheal if appropriate and nasal secretions for changes in amount and color  - Dora appropriate cooling/warming therapies per order  - Administer medications as ordered  - Instruct and encourage patient and family to use good hand hygiene technique  - Identify and instruct in appropriate isolation precautions for identified infection/condition  Outcome: Progressing     Problem: SAFETY ADULT  Goal: Maintains/Returns to pre admission functional level  Description: INTERVENTIONS:  - Perform BMAT or MOVE assessment daily    - Set and communicate daily mobility goal to care team and patient/family/caregiver  - Collaborate with rehabilitation services on mobility goals if consulted  - Perform Range of Motion 3 times a day  - Reposition patient every 2 hours    - Dangle patient 3 times a day  - Stand patient 3 times a day  - Ambulate patient 3 times a day  - Out of bed to chair 3 times a day   - Out of bed for meals 3 times a day  - Out of bed for toileting  - Record patient progress and toleration of activity level   Outcome: Progressing     Problem: DISCHARGE PLANNING  Goal: Discharge to home or other facility with appropriate resources  Description: INTERVENTIONS:  - Identify barriers to discharge w/patient and caregiver  - Arrange for needed discharge resources and transportation as appropriate  - Identify discharge learning needs (meds, wound care, etc )  - Arrange for interpretive services to assist at discharge as needed  - Refer to Case Management Department for coordinating discharge planning if the patient needs post-hospital services based on physician/advanced practitioner order or complex needs related to functional status, cognitive ability, or social support system  Outcome: Progressing     Problem: Knowledge Deficit  Goal: Patient/family/caregiver demonstrates understanding of disease process, treatment plan, medications, and discharge instructions  Description: Complete learning assessment and assess knowledge base  Interventions:  - Provide teaching at level of understanding  - Provide teaching via preferred learning methods  Outcome: Progressing     Problem: Nutrition/Hydration-ADULT  Goal: Nutrient/Hydration intake appropriate for improving, restoring or maintaining nutritional needs  Description: Monitor and assess patient's nutrition/hydration status for malnutrition  Collaborate with interdisciplinary team and initiate plan and interventions as ordered  Monitor patient's weight and dietary intake as ordered or per policy  Utilize nutrition screening tool and intervene as necessary  Determine patient's food preferences and provide high-protein, high-caloric foods as appropriate       INTERVENTIONS:  - Monitor oral intake, urinary output, labs, and treatment plans  - Assess nutrition and hydration status and recommend course of action  - Evaluate amount of meals eaten  - Assist patient with eating if necessary   - Allow adequate time for meals  - Recommend/ encourage appropriate diets, oral nutritional supplements, and vitamin/mineral supplements  - Order, calculate, and assess calorie counts as needed  - Recommend, monitor, and adjust tube feedings and TPN/PPN based on assessed needs  - Assess need for intravenous fluids  - Provide specific nutrition/hydration education as appropriate  - Include patient/family/caregiver in decisions related to nutrition  Outcome: Progressing     Problem: SAFETY,RESTRAINT: NV/NON-SELF DESTRUCTIVE BEHAVIOR  Goal: Remains free of harm/injury (restraint for non violent/non self-detsructive behavior)  Description: INTERVENTIONS:  - Instruct patient/family regarding restraint use   - Assess and monitor physiologic and psychological status   - Provide interventions and comfort measures to meet assessed patient needs   - Identify and implement measures to help patient regain control  - Assess readiness for release of restraint   Outcome: Progressing  Goal: Returns to optimal restraint-free functioning  Description: INTERVENTIONS:  - Assess the patient's behavior and symptoms that indicate continued need for restraint  - Identify and implement measures to help patient regain control  - Assess readiness for release of restraint   Outcome: Progressing     Problem: COPING  Goal: Pt/Family able to verbalize concerns and demonstrate effective coping strategies  Description: INTERVENTIONS:  - Assist patient/family to identify coping skills, available support systems and cultural and spiritual values  - Provide emotional support, including active listening and acknowledgement of concerns of patient and caregivers  - Reduce environmental stimuli, as able  - Provide patient education  - Assess for spiritual pain/suffering and initiate spiritual care, including notification of Pastoral Care or bib based community as needed  - Assess effectiveness of coping strategies  Outcome: Progressing  Goal: Will report anxiety at manageable levels  Description: INTERVENTIONS:  - Administer medication as ordered  - Teach and encourage coping skills  - Provide emotional support  - Assess patient/family for anxiety and ability to cope  Outcome: Progressing     Problem: DEATH & DYING  Goal: Pt/Family communicate acceptance of impending death and expresses psychological comfort and peace  Description: INTERVENTIONS:  - Assess patient/family anxiety and grief process related to end of life issues  - Provide emotional, spiritual and psychosocial support  - Provide information about the patient’s health status with consideration of family and cultural values  - Communicate willingness to discuss death and facilitate grief process  with patient/family as appropriate  - Emphasize sustaining relationships within family system and community, or bib/spiritual traditions  - Initiate Spiritual Care, Pastoral care or other ancillary consults as needed  - Refer to community support groups as appropriate  Outcome: Progressing     Problem: DECISION MAKING  Goal: Pt/Family able to effectively weigh alternatives and participate in decision making related to treatment and care  Description: INTERVENTIONS:  - Identify decision maker  - Determine when there are differences among patient's view, family's view, and healthcare provider's view of patient condition and care goals  - Facilitate patient/family articulation of goals for care  - Help patient/family identify pros/cons of alternative solutions  - Provide information as requested by patient/family  - Respect patient/family rights related to privacy and sharing information   - Serve as a liaison between patient, family and health care team  - Initiate consults as appropriate (Ethics Team, Palliative Care, Family Care Conference, etc )  Outcome: Progressing     Problem: CONFUSION/THOUGHT DISTURBANCE  Goal: Thought disturbances (confusion, delirium, depression, dementia or psychosis) are managed to maintain or return to baseline mental status and functional level  Description: INTERVENTIONS:  - Assess for possible contributors to  thought disturbance, including but not limited to medications, infection, impaired vision or hearing, underlying metabolic abnormalities, dehydration, respiratory compromise,  psychiatric diagnoses and notify attending PHYSICAN/AP  - Monitor and intervene to maintain adequate nutrition, hydration, elimination, sleep and activity  - Decrease environmental stimuli, including noise as appropriate  - Provide frequent contacts to provide refocusing, direction and reassurance as needed  Approach patient calmly with eye contact and at their level    - [place] high risk fall precautions, aspiration precautions and other safety measures, as indicated  - If delirium suspected, notify physician/AP of change in condition and request immediate in-person evaluation  - Pursue consults as appropriate including Geriatric (campus dependent), OT for cognitive evaluation/activity planning, psychiatric, pastoral care, etc   Outcome: Progressing     Problem: BEHAVIOR  Goal: Pt/Family maintain appropriate behavior and adhere to behavioral management agreement, if implemented  Description: INTERVENTIONS:  - Assess the family dynamic   - Encourage verbalization of thoughts and concerns in a socially appropriate manner  - Assess patient/family's coping skills and non-compliant behavior (including use of illegal substances)  - Utilize positive, consistent limit setting strategies supporting safety of patient, staff and others  - Initiate consult with Case Management, Spiritual Care or other ancillary services as appropriate  - If a patient's/visitor's behavior jeopardizes the safety of the patient, staff, or others, refer to organization procedure     - Notify Security of behavior or suspected illegal substances which indicate the need for search of the patient and/or belongings  - Encourage participation in the decision making process about a behavioral management agreement; implement if patient meets criteria  Outcome: Progressing     Problem: ABUSE/NEGLECT  Goal: Pt/Caregiver/Family aware of resources to assist with issues of abuse and neglect  Description: INTERVENTIONS:  - If child abuse and/or neglect is suspected, notify [employer] directly  - Assess for level of risk and safety  - Initiate referral to Case Management  - Notify PHYSICIAN/AP and Nursing Supervisor  - Provide appropriate education and resources to patient and/or family  - Initiate referral to age appropriate protective services, i e  [facility] or Child Protective Services  - Offer patient/caregiver the option to [place] [facility] patient [employer]  - Provide emotional support, including active listening and acknowledgment of concerns  - Provide the patient with information about supportive services i e  shelters, community resources for domestic violence  Outcome: Progressing     Problem: SUBSTANCE USE/ABUSE  Goal: Will have no detox symptoms and will verbalize plan for changing substance-related behavior  Description: INTERVENTIONS:  - Monitor physical status and assess for symptoms of withdrawal  - Administer medication as ordered  - Provide emotional support with 1 on 1 interaction with staff  - Encourage recovery focused program/ addiction education  - Assess for verbalization of changing behaviors related to substance abuse  - Initiate consults and referrals as appropriate (Case Management, Spiritual Care, etc )  Outcome: Progressing  Goal: By discharge, will develop insight into their chemical dependency and sustain motivation to continue in recovery  Description: INTERVENTIONS:  - Attends all daily group sessions and scheduled AA groups  - Actively practices coping skills through participation in the therapeutic community and adherence to program rules  - Reviews and completes assignments from individual treatment plan  - Assist patient development of understanding of their personal cycle of addiction and relapse triggers  Outcome: Progressing  Goal: By discharge, patient will have ongoing treatment plan addressing chemical dependency  Description: INTERVENTIONS:  - Assist patient with resources and/or appointments for ongoing recovery based living  Outcome: Progressing     Problem: SPIRITUAL CARE  Goal: Pt/Family able to move forward in process of forgiving self, others and/or higher power  Description: INTERVENTIONS:  - Assist patient with any spiritual needs/requests such as communion, confession, anointing, etc  - Explore guilt and help patient/family identify possible spiritual/cultural beliefs and values  - Explore possibilities of making amends & reconciliation with self, others, and/or a greater power  - Guide patient/family in identifying painful feelings  - Help patient explore and identify spiritual beliefs, cultural understandings or values that may help or hinder letting go of issue  - Help patient explore feelings of anger, bitterness, resentment, anxiety   Help patient/family identify and examine the situation in which these feelings are experienced  - Help patient/family identify destructive displacement of feelings onto other individuals  - Refer patient to formal counseling and/or to bib community for further support as needed or per request  Outcome: Progressing  Goal: Patient feels balance and connection with others and/or higher power that empowers the self during times of loss, guilt and fear  Description: INTERVENTIONS:  - Create safety for patient through empathic presence and non-judgmental listening  - Encourage patient to explore his/her values, beliefs and/or spiritual images and practices  - Encourage use of breath work, imagery, meditation, relaxation, reiki to ease distress and provide healing  - Encourage use of cultural and spiritual celebrations and rituals  - Facilitate discussion that helps patient sort out spiritual concerns  - Help patient identify where meaning/hope/comfort & strength are in his/her life  - Refer patient to bib community for assistance, as appropriate  - Respond to patient/family need for prayer/ritual/sacrament/ceremony  Outcome: Progressing

## 2022-12-10 NOTE — OCCUPATIONAL THERAPY NOTE
Occupational Therapy Treatment Note:      12/10/22 0925   OT Last Visit   OT Visit Date 12/10/22   Note Type   Note Type Treatment   Pain Assessment   Pain Assessment Tool FLACC   Pain Rating: FLACC (Rest) - Face 0   Pain Rating: FLACC (Rest) - Legs 0   Pain Rating: FLACC (Rest) - Activity 0   Pain Rating: FLACC (Rest) - Cry 0   Pain Rating: FLACC (Rest) - Consolability 0   Score: FLACC (Rest) 0   Pain Rating: FLACC (Activity) - Face 1   Pain Rating: FLACC (Activity) - Legs 1   Pain Rating: FLACC (Activity) - Activity 1   Pain Rating: FLACC (Activity) - Cry 1   Pain Rating: FLACC (Activity) - Consolability 0   Score: FLACC (Activity) 4   ADL   Where Assessed Edge of bed   Grooming Assistance 3  Moderate Assistance   Grooming Comments face washing hand washing comb usage and oral care swab   pt needs task to be placed into hand and guiding for initiation   UB Bathing Assistance 4  Minimal Assistance   LB Bathing Assistance 2  Maximal Assistance   LB Bathing Comments pt was ablke to wash / dry to her knees   UB Dressing Assistance 3  Moderate Assistance   LB Dressing Assistance 2  Maximal Assistance   Toileting Assistance  1  Total Assistance   Functional Standing Tolerance   Time f- standing balance however this is improvement from previous sessions   Bed Mobility   Supine to Sit 2  Maximal assistance   Transfers   Sit to Stand 3  Moderate assistance   Additional items Assist x 2   Stand to Sit 3  Moderate assistance   Additional items Assist x 2   Stand pivot   (mod asst x 2 improvement noted with this spt)   Cognition   Overall Cognitive Status Impaired   Arousal/Participation Alert   Attention Attends with cues to redirect   Orientation Level   (states 2022 for year, unable to id month date smiley )   Activity Tolerance   Activity Tolerance Patient tolerated treatment well   Assessment   Assessment pt participated in am ot session and was seen focusing on adl tasks seated eob, sit to stand and spt, direction following and grooming task  pt with need for min to max asst for adls ub / lb, asst x 1-2 for spt and sit to stand although improvement observed  pt used r ue to brush teeth and gums with  toothette, needed max asst for hair combing  pt with mumbled speech this am / possiblly to dry mouth  pt with noted mouth breathing this session needing cues to use o2  pt's pulse ox desated to 87% with activity on and off o2   Plan   Treatment Interventions ADL retraining;Functional transfer training; Endurance training;Patient/family training; Activityengagement   Goal Expiration Date 12/16/22   OT Treatment Day   (3)   OT Frequency   (2-4x/wk)   Recommendation   OT Discharge Recommendation Post acute rehabilitation services   AM-PAC Daily Activity Inpatient   Lower Body Dressing 2   Bathing 2   Toileting 2   Upper Body Dressing 2   Grooming 2   Eating 2   Daily Activity Raw Score 12   Daily Activity Standardized Score (Calc for Raw Score >=11) 30 6   AM-PAC Applied Cognition Inpatient   Following a Speech/Presentation 2   Understanding Ordinary Conversation 3   Taking Medications 1   Remembering Where Things Are Placed or Put Away 1   Remembering List of 4-5 Errands 1   Taking Care of Complicated Tasks 1   Applied Cognition Raw Score 9   Applied Cognition Standardized Score 22 48   April A Storm

## 2022-12-10 NOTE — ASSESSMENT & PLAN NOTE
- In the setting of left sided rib fractures, acute pneumonia from COVID-19 present on admission  -Oxygen requirements continue to improve-3 L today down from 8 L   - Dysphagia diet due to aspiration  - Moderate COVID-19 pathway  - Aggressive pulmonary hygiene  - Rib fx protocol  - Continue to discuss goals of care with nephew- as of 12/9, continue to evaluate for progress as she is improving clinically overall   VBS pending on 12/13; continue level 3 DNR/ DNI

## 2022-12-10 NOTE — PLAN OF CARE
Problem: OCCUPATIONAL THERAPY ADULT  Goal: Performs self-care activities at highest level of function for planned discharge setting  See evaluation for individualized goals  Description: Treatment Interventions: ADL retraining, Functional transfer training, Cognitive reorientation, Endurance training, UE strengthening/ROM, Patient/family training, Equipment evaluation/education, Compensatory technique education, Continued evaluation, Energy conservation, Activityengagement          See flowsheet documentation for full assessment, interventions and recommendations  Outcome: Progressing  Note: Limitation: Decreased ADL status, Decreased UE strength, Decreased UE ROM, Decreased Safe judgement during ADL, Decreased cognition, Decreased endurance, Decreased self-care trans, Decreased high-level ADLs  Prognosis: Fair  Assessment: pt participated in am ot session and was seen focusing on adl tasks seated eob, sit to stand and spt, direction following and grooming task  pt with need for min to max asst for adls ub / lb, asst x 1-2 for spt and sit to stand although improvement observed  pt used r ue to brush teeth and gums with  toothette, needed max asst for hair combing  pt with mumbled speech this am / possiblly to dry mouth   pt with noted mouth breathing this session needing cues to use o2  pt's pulse ox desated to 87% with activity on and off o2     OT Discharge Recommendation: Post acute rehabilitation services     April A Storm

## 2022-12-10 NOTE — PLAN OF CARE
Problem: PHYSICAL THERAPY ADULT  Goal: Performs mobility at highest level of function for planned discharge setting  See evaluation for individualized goals  Description: Treatment/Interventions: OT, Spoke to nursing, Gait training, Bed mobility, Patient/family training, Endurance training, LE strengthening/ROM, Functional transfer training  Equipment Recommended:  (TBD)       See flowsheet documentation for full assessment, interventions and recommendations  Outcome: Progressing  Note: Prognosis: Fair  Problem List: Decreased strength, Decreased range of motion, Decreased endurance, Impaired balance, Decreased mobility, Decreased coordination, Decreased cognition, Impaired judgement, Decreased safety awareness, Pain  Assessment: The patient is demonstrating improvement in alertness, strength, and balance  She continues to remain significantly limited from her baseline however  One-step instructions as well as continued redirection to task are necessary throughout the session  She required less assistance to transfer out to the chair today, but she does fatigue easily  The patient was in the chair with the alarm active post session with restraints secured  Barriers to Discharge: Inaccessible home environment, Decreased caregiver support     PT Discharge Recommendation: Post acute rehabilitation services    See flowsheet documentation for full assessment

## 2022-12-10 NOTE — PLAN OF CARE
Problem: PHYSICAL THERAPY ADULT  Goal: Performs mobility at highest level of function for planned discharge setting  See evaluation for individualized goals  Description: Treatment/Interventions: OT, Spoke to nursing, Gait training, Bed mobility, Patient/family training, Endurance training, LE strengthening/ROM, Functional transfer training  Equipment Recommended:  (TBD)       See flowsheet documentation for full assessment, interventions and recommendations  12/10/2022 1536 by Buffy Sanabria PTA  Outcome: Progressing  Note: Prognosis: Fair  Problem List: Decreased strength, Decreased range of motion, Decreased endurance, Impaired balance, Decreased mobility, Decreased coordination, Decreased cognition, Impaired judgement, Decreased safety awareness, Pain  Assessment: The patient was fatigued this afternoon, and she required significantly more assistance  She also had more difficulty following instructions than she did this morning  Her knees needed to be blocked as she was pivoted over to the bed  She was cleaned up and fresh linens provided again  She was positioned right sidelying with bilateral wrist restraints secured and a pillow in-between her knees  Alarm was active  Barriers to Discharge: Inaccessible home environment, Decreased caregiver support     PT Discharge Recommendation: Post acute rehabilitation services    See flowsheet documentation for full assessment  12/10/2022 1522 by Buffy Sanabria PTA  Outcome: Progressing  Note: Prognosis: Fair  Problem List: Decreased strength, Decreased range of motion, Decreased endurance, Impaired balance, Decreased mobility, Decreased coordination, Decreased cognition, Impaired judgement, Decreased safety awareness, Pain  Assessment: The patient is demonstrating improvement in alertness, strength, and balance  She continues to remain significantly limited from her baseline however   One-step instructions as well as continued redirection to task are necessary throughout the session  She required less assistance to transfer out to the chair today, but she does fatigue easily  The patient was in the chair with the alarm active post session with restraints secured  Barriers to Discharge: Inaccessible home environment, Decreased caregiver support     PT Discharge Recommendation: Post acute rehabilitation services    See flowsheet documentation for full assessment

## 2022-12-10 NOTE — PHYSICAL THERAPY NOTE
Physical Therapy Progress Note     12/10/22 1340   PT Last Visit   PT Visit Date 12/10/22   Note Type   Note Type Treatment   Pain Assessment   Pain Assessment Tool FLACC   Pain Rating: FLACC (Rest) - Face 0   Pain Rating: FLACC (Rest) - Legs 0   Pain Rating: FLACC (Rest) - Activity 0   Pain Rating: FLACC (Rest) - Cry 0   Pain Rating: FLACC (Rest) - Consolability 0   Score: FLACC (Rest) 0   Pain Rating: FLACC (Activity) - Face 1   Pain Rating: FLACC (Activity) - Legs 1   Pain Rating: FLACC (Activity) - Activity 1   Pain Rating: FLACC (Activity) - Cry 1   Pain Rating: FLACC (Activity) - Consolability 0   Score: FLACC (Activity) 4   Restrictions/Precautions   Other Precautions Cognitive; Chair Alarm; Bed Alarm;Pain; Fall Risk;O2;Multiple lines; Restraints  (Alarm active and bilateral wrist restraints secured post session )   Subjective   Subjective The patient expresses fatigue  Bed Mobility   Sit to Supine 2  Maximal assistance   Additional items Assist x 1; Increased time required;Verbal cues;LE management   Transfers   Sit to Stand 2  Maximal assistance   Additional items Assist x 1; Increased time required;Verbal cues   Stand to Sit 2  Maximal assistance   Additional items Assist x 1; Increased time required;Verbal cues   Stand pivot 1  Dependent   Additional items Assist x 1; Increased time required;Verbal cues   Balance   Static Sitting Poor   Dynamic Sitting Poor -   Static Standing Zero   Ambulatory Zero   Activity Tolerance   Activity Tolerance Patient limited by fatigue;Patient limited by pain   Nurse [id number] [place] [address],[facility]  [id number], RN  Assessment   Prognosis Fair   Problem List Decreased strength;Decreased range of motion;Decreased endurance; Impaired balance;Decreased mobility; Decreased coordination;Decreased cognition; Impaired judgement;Decreased safety awareness;Pain   Assessment The patient was fatigued this afternoon, and she required significantly more assistance   She also had more difficulty following instructions than she did this morning  Her knees needed to be blocked as she was pivoted over to the bed  She was cleaned up and fresh linens provided again  She was positioned right sidelying with bilateral wrist restraints secured and a pillow in-between her knees  Alarm was active  Barriers to Discharge Inaccessible home environment;Decreased caregiver support   Goals   Patient Goals Pt  asleep upon leaving  STG Expiration Date 12/14/22   PT Treatment Day 4   Plan   Treatment/Interventions Functional transfer training;LE strengthening/ROM; Therapeutic exercise; Endurance training;Cognitive reorientation;Patient/family training;Bed mobility;Gait training   Progress Slow progress, decreased activity tolerance   PT Frequency 3-5x/wk   Recommendation   PT Discharge Recommendation Post acute rehabilitation services   AM-PAC Basic Mobility Inpatient   Turning in Bed Without Bedrails 2   Lying on Back to Sitting on Edge of Flat Bed 1   Moving Bed to Chair 1   Standing Up From Chair 1   Walk in Room 1   Climb 3-5 Stairs 1   Basic Mobility Inpatient Raw Score 7   Turning Head Towards Sound 3   Follow Simple Instructions 2   Low Function Basic Mobility Raw Score 12   Low Function Basic Mobility Standardized Score 18 33   Highest Level Of Mobility   JH-HLM Goal 2: Bed activities/Dependent transfer   JH-HL Achieved 4: Move to chair/commode       An AM-PAC Basic Mobility standardized score less than 40 78 suggests the patient may benefit from discharge to post-acute rehab services      [facility], PTA

## 2022-12-10 NOTE — ASSESSMENT & PLAN NOTE
- Hypernatremia  - Na 147 today, stable  - Appreciate Nephrology consult and recommendations  - Continue D5W IV fluids--increased today to 90 mL/HR  - Urine studies completed  - Evidence of dehydration due to poor PO intake/ aspiration  - Discussed goals of care with nephew, who is going to talk to the patient's   Patient is to remain level 3 DNR/ DNI but at this time, family is not ready to pursue hospice  As of 12/9, continue to evaluate for progress as she is improving clinically overall  VBS pending on 12/13, per nephew

## 2022-12-10 NOTE — ASSESSMENT & PLAN NOTE
- 12/7 UA indicating UTI  -Pleated 3-day course of Rocephin-mental status has been reported as improving  -WBC count downtrending     -Continue to monitor for fever or signs of sepsis    - Urinary retention protocol

## 2022-12-10 NOTE — ASSESSMENT & PLAN NOTE
- O2 as needed, currently requiring supplemental O2 and requirements have decreased over the past 24 hrs    - Currently on 3 L O2 NC, improving  - Contact and airborne precautions   - Pulmonary toilet  - CXR 12/5 Showed small left effusion, No PTX, No focal consolidation   - Repeat CXR 12/7 indicates development of vascular congestion  - COVID Pathway initiated Moderate, treat for 10 days, through 12/12  - Monitor daily labs  - Contributory to acute respiratory failure

## 2022-12-10 NOTE — ASSESSMENT & PLAN NOTE
- Geriatrics consult - recs appreciated  - GCS 14 ([id number]) disoriented to time and place  - Restraints secondary to impulsivity  - Re- Directable, follows commands  - no behavioral disturbances currently  - decrease in function since admission, cannot return to living at home per Geriatrics  - Discussed goals of care with nephew, who is going to talk to the patient's   Patient is to remain level 3 DNR/ DNI but at this time, family is not ready to pursue hospice  As of 12/9, continue to evaluate for progress as she is improving clinically overall  VBS pending on 12/13, per nephew

## 2022-12-11 LAB
ANION GAP SERPL CALCULATED.3IONS-SCNC: 2 MMOL/L (ref 4–13)
BUN SERPL-MCNC: 25 MG/DL (ref 5–25)
CALCIUM SERPL-MCNC: 8.1 MG/DL (ref 8.3–10.1)
CHLORIDE SERPL-SCNC: 114 MMOL/L (ref 96–108)
CO2 SERPL-SCNC: 28 MMOL/L (ref 21–32)
CREAT SERPL-MCNC: 0.65 MG/DL (ref 0.6–1.3)
GFR SERPL CREATININE-BSD FRML MDRD: 75 ML/MIN/1.73SQ M
GLUCOSE SERPL-MCNC: 113 MG/DL (ref 65–140)
GLUCOSE SERPL-MCNC: 120 MG/DL (ref 65–140)
GLUCOSE SERPL-MCNC: 127 MG/DL (ref 65–140)
GLUCOSE SERPL-MCNC: 176 MG/DL (ref 65–140)
GLUCOSE SERPL-MCNC: 178 MG/DL (ref 65–140)
POTASSIUM SERPL-SCNC: 4.1 MMOL/L (ref 3.5–5.3)
SODIUM SERPL-SCNC: 144 MMOL/L (ref 135–147)

## 2022-12-11 RX ADMIN — POTASSIUM CHLORIDE: 2 INJECTION, SOLUTION, CONCENTRATE INTRAVENOUS at 11:46

## 2022-12-11 RX ADMIN — Medication 1 TABLET: at 10:10

## 2022-12-11 RX ADMIN — LIDOCAINE 5% 1 PATCH: 700 PATCH TOPICAL at 10:10

## 2022-12-11 RX ADMIN — DOCUSATE SODIUM 100 MG: 100 CAPSULE, LIQUID FILLED ORAL at 17:26

## 2022-12-11 RX ADMIN — BARICITINIB 2 MG: 2 TABLET, FILM COATED ORAL at 10:11

## 2022-12-11 RX ADMIN — POTASSIUM CHLORIDE: 2 INJECTION, SOLUTION, CONCENTRATE INTRAVENOUS at 00:29

## 2022-12-11 RX ADMIN — BIMATOPROST 1 DROP: 0.1 SOLUTION/ DROPS OPHTHALMIC at 21:34

## 2022-12-11 RX ADMIN — DOCUSATE SODIUM 100 MG: 100 CAPSULE, LIQUID FILLED ORAL at 10:11

## 2022-12-11 RX ADMIN — INSULIN LISPRO 1 UNITS: 100 INJECTION, SOLUTION INTRAVENOUS; SUBCUTANEOUS at 23:58

## 2022-12-11 RX ADMIN — INSULIN LISPRO 1 UNITS: 100 INJECTION, SOLUTION INTRAVENOUS; SUBCUTANEOUS at 17:54

## 2022-12-11 RX ADMIN — OLANZAPINE 5 MG: 5 TABLET, ORALLY DISINTEGRATING ORAL at 21:34

## 2022-12-11 RX ADMIN — DEXAMETHASONE SODIUM PHOSPHATE 6 MG: 4 INJECTION INTRA-ARTICULAR; INTRALESIONAL; INTRAMUSCULAR; INTRAVENOUS; SOFT TISSUE at 10:10

## 2022-12-11 RX ADMIN — ENOXAPARIN SODIUM 30 MG: 30 INJECTION SUBCUTANEOUS at 18:15

## 2022-12-11 RX ADMIN — SENNOSIDES 8.6 MG: 8.6 TABLET, FILM COATED ORAL at 21:34

## 2022-12-11 NOTE — PLAN OF CARE
Problem: Prexisting or High Potential for Compromised Skin Integrity  Goal: Skin integrity is maintained or improved  Description: INTERVENTIONS:  - Identify patients at risk for skin breakdown  - Assess and monitor skin integrity  - Assess and monitor nutrition and hydration status  - Monitor labs   - Assess for incontinence   - Turn and reposition patient  - Assist with mobility/ambulation  - Relieve pressure over bony prominences  - Avoid friction and shearing  - Provide appropriate hygiene as needed including keeping skin clean and dry  - Evaluate need for skin moisturizer/barrier cream  - Collaborate with interdisciplinary team   - Patient/family teaching  - Consider wound care consult   Outcome: Progressing     Problem: Potential for Falls  Goal: Patient will remain free of falls  Description: INTERVENTIONS:  - Educate patient/family on patient safety including physical limitations  - Instruct patient to call for assistance with activity   - Consult OT/PT to assist with strengthening/mobility   - Keep Call bell within reach  - Keep bed low and locked with side rails adjusted as appropriate  - Keep care items and personal belongings within reach  - Initiate and maintain comfort rounds  - Make Fall Risk Sign visible to staff  - Offer Toileting every 1 Hours, in advance of need  - Initiate/Maintain alarm  - Obtain necessary fall risk management equipment  - Apply yellow socks and bracelet for high fall risk patients  - Consider moving patient to room near nurses station  Outcome: Progressing     Problem: MOBILITY - ADULT  Goal: Maintain or return to baseline ADL function  Description: INTERVENTIONS:  -  Assess patient's ability to carry out ADLs; assess patient's baseline for ADL function and identify physical deficits which impact ability to perform ADLs (bathing, care of mouth/teeth, toileting, grooming, dressing, etc )  - Assess/evaluate cause of self-care deficits   - Assess range of motion  - Assess patient's mobility; develop plan if impaired  - Assess patient's need for assistive devices and provide as appropriate  - Encourage maximum independence but intervene and supervise when necessary  - Involve family in performance of ADLs  - Assess for home care needs following discharge   - Consider OT consult to assist with ADL evaluation and planning for discharge  - Provide patient education as appropriate  Outcome: Progressing  Goal: Maintains/Returns to pre admission functional level  Description: INTERVENTIONS:  - Perform BMAT or MOVE assessment daily    - Set and communicate daily mobility goal to care team and patient/family/caregiver  - Collaborate with rehabilitation services on mobility goals if consulted  - Perform Range of Motion 3 times a day  - Reposition patient every 2 hours    - Dangle patient 3 times a day  - Stand patient 3 times a day  - Ambulate patient 3 times a day  - Out of bed to chair 3 times a day   - Out of bed for meals 3 times a day  - Out of bed for toileting  - Record patient progress and toleration of activity level   Outcome: Progressing     Problem: PAIN - ADULT  Goal: Verbalizes/displays adequate comfort level or baseline comfort level  Description: Interventions:  - Encourage patient to monitor pain and request assistance  - Assess pain using appropriate pain scale  - Administer analgesics based on type and severity of pain and evaluate response  - Implement non-pharmacological measures as appropriate and evaluate response  - Consider cultural and social influences on pain and pain management  - Notify physician/advanced practitioner if interventions unsuccessful or patient reports new pain  Outcome: Progressing     Problem: INFECTION - ADULT  Goal: Absence or prevention of progression during hospitalization  Description: INTERVENTIONS:  - Assess and monitor for signs and symptoms of infection  - Monitor lab/diagnostic results  - Monitor all insertion sites, i e  indwelling lines, tubes, and drains  - Monitor endotracheal if appropriate and nasal secretions for changes in amount and color  - [place] appropriate cooling/warming therapies per order  - Administer medications as ordered  - Instruct and encourage patient and family to use good hand hygiene technique  - Identify and instruct in appropriate isolation precautions for identified infection/condition  Outcome: Progressing     Problem: SAFETY ADULT  Goal: Maintains/Returns to pre admission functional level  Description: INTERVENTIONS:  - Perform BMAT or MOVE assessment daily    - Set and communicate daily mobility goal to care team and patient/family/caregiver  - Collaborate with rehabilitation services on mobility goals if consulted  - Perform Range of Motion 3 times a day  - Reposition patient every 2 hours    - Dangle patient 3 times a day  - Stand patient 3 times a day  - Ambulate patient 3 times a day  - Out of bed to chair 3 times a day   - Out of bed for meals 3 times a day  - Out of bed for toileting  - Record patient progress and toleration of activity level   Outcome: Progressing     Problem: DISCHARGE PLANNING  Goal: Discharge to home or other facility with appropriate resources  Description: INTERVENTIONS:  - Identify barriers to discharge w/patient and caregiver  - Arrange for needed discharge resources and transportation as appropriate  - Identify discharge learning needs (meds, wound care, etc )  - Arrange for interpretive services to assist at discharge as needed  - Refer to Case Management Department for coordinating discharge planning if the patient needs post-hospital services based on physician/advanced practitioner order or complex needs related to functional status, cognitive ability, or social support system  Outcome: Progressing     Problem: Knowledge Deficit  Goal: Patient/family/caregiver demonstrates understanding of disease process, treatment plan, medications, and discharge instructions  Description: Complete learning assessment and assess knowledge base  Interventions:  - Provide teaching at level of understanding  - Provide teaching via preferred learning methods  Outcome: Progressing     Problem: Nutrition/Hydration-ADULT  Goal: Nutrient/Hydration intake appropriate for improving, restoring or maintaining nutritional needs  Description: Monitor and assess patient's nutrition/hydration status for malnutrition  Collaborate with interdisciplinary team and initiate plan and interventions as ordered  Monitor patient's weight and dietary intake as ordered or per policy  Utilize nutrition screening tool and intervene as necessary  Determine patient's food preferences and provide high-protein, high-caloric foods as appropriate       INTERVENTIONS:  - Monitor oral intake, urinary output, labs, and treatment plans  - Assess nutrition and hydration status and recommend course of action  - Evaluate amount of meals eaten  - Assist patient with eating if necessary   - Allow adequate time for meals  - Recommend/ encourage appropriate diets, oral nutritional supplements, and vitamin/mineral supplements  - Order, calculate, and assess calorie counts as needed  - Recommend, monitor, and adjust tube feedings and TPN/PPN based on assessed needs  - Assess need for intravenous fluids  - Provide specific nutrition/hydration education as appropriate  - Include patient/family/caregiver in decisions related to nutrition  Outcome: Progressing     Problem: SAFETY,RESTRAINT: NV/NON-SELF DESTRUCTIVE BEHAVIOR  Goal: Remains free of harm/injury (restraint for non violent/non self-detsructive behavior)  Description: INTERVENTIONS:  - Instruct patient/family regarding restraint use   - Assess and monitor physiologic and psychological status   - Provide interventions and comfort measures to meet assessed patient needs   - Identify and implement measures to help patient regain control  - Assess readiness for release of restraint   Outcome: Progressing  Goal: Returns to optimal restraint-free functioning  Description: INTERVENTIONS:  - Assess the patient's behavior and symptoms that indicate continued need for restraint  - Identify and implement measures to help patient regain control  - Assess readiness for release of restraint   Outcome: Progressing     Problem: COPING  Goal: Pt/Family able to verbalize concerns and demonstrate effective coping strategies  Description: INTERVENTIONS:  - Assist patient/family to identify coping skills, available support systems and cultural and spiritual values  - Provide emotional support, including active listening and acknowledgement of concerns of patient and caregivers  - Reduce environmental stimuli, as able  - Provide patient education  - Assess for spiritual pain/suffering and initiate spiritual care, including notification of Pastoral Care or bib based community as needed  - Assess effectiveness of coping strategies  Outcome: Progressing  Goal: Will report anxiety at manageable levels  Description: INTERVENTIONS:  - Administer medication as ordered  - Teach and encourage coping skills  - Provide emotional support  - Assess patient/family for anxiety and ability to cope  Outcome: Progressing     Problem: DEATH & DYING  Goal: Pt/Family communicate acceptance of impending death and expresses psychological comfort and peace  Description: INTERVENTIONS:  - Assess patient/family anxiety and grief process related to end of life issues  - Provide emotional, spiritual and psychosocial support  - Provide information about the patient’s health status with consideration of family and cultural values  - Communicate willingness to discuss death and facilitate grief process  with patient/family as appropriate  - Emphasize sustaining relationships within family system and community, or bib/spiritual traditions  - Initiate Spiritual Care, Pastoral care or other ancillary consults as needed  - Refer to community support groups as appropriate  Outcome: Progressing     Problem: DECISION MAKING  Goal: Pt/Family able to effectively weigh alternatives and participate in decision making related to treatment and care  Description: INTERVENTIONS:  - Identify decision maker  - Determine when there are differences among patient's view, family's view, and healthcare provider's view of patient condition and care goals  - Facilitate patient/family articulation of goals for care  - Help patient/family identify pros/cons of alternative solutions  - Provide information as requested by patient/family  - Respect patient/family rights related to privacy and sharing information   - Serve as a liaison between patient, family and health care team  - Initiate consults as appropriate (Ethics Team, Palliative Care, Family Care Conference, etc )  Outcome: Progressing     Problem: CONFUSION/THOUGHT DISTURBANCE  Goal: Thought disturbances (confusion, delirium, depression, dementia or psychosis) are managed to maintain or return to baseline mental status and functional level  Description: INTERVENTIONS:  - Assess for possible contributors to  thought disturbance, including but not limited to medications, infection, impaired vision or hearing, underlying metabolic abnormalities, dehydration, respiratory compromise,  psychiatric diagnoses and notify attending PHYSICAN/AP  - Monitor and intervene to maintain adequate nutrition, hydration, elimination, sleep and activity  - Decrease environmental stimuli, including noise as appropriate  - Provide frequent contacts to provide refocusing, direction and reassurance as needed  Approach patient calmly with eye contact and at their level    - [place] high risk fall precautions, aspiration precautions and other safety measures, as indicated  - If delirium suspected, notify physician/AP of change in condition and request immediate in-person evaluation  - Pursue consults as appropriate including Geriatric (campus dependent), OT for cognitive evaluation/activity planning, psychiatric, pastoral care, etc   Outcome: Progressing     Problem: BEHAVIOR  Goal: Pt/Family maintain appropriate behavior and adhere to behavioral management agreement, if implemented  Description: INTERVENTIONS:  - Assess the family dynamic   - Encourage verbalization of thoughts and concerns in a socially appropriate manner  - Assess patient/family's coping skills and non-compliant behavior (including use of illegal substances)  - Utilize positive, consistent limit setting strategies supporting safety of patient, staff and others  - Initiate consult with Case Management, Spiritual Care or other ancillary services as appropriate  - If a patient's/visitor's behavior jeopardizes the safety of the patient, staff, or others, refer to organization procedure     - Notify Security of behavior or suspected illegal substances which indicate the need for search of the patient and/or belongings  - Encourage participation in the decision making process about a behavioral management agreement; implement if patient meets criteria  Outcome: Progressing     Problem: ABUSE/NEGLECT  Goal: Pt/Caregiver/Family aware of resources to assist with issues of abuse and neglect  Description: INTERVENTIONS:  - If child abuse and/or neglect is suspected, notify [employer] directly  - Assess for level of risk and safety  - Initiate referral to Case Management  - Notify PHYSICIAN/AP and Nursing Supervisor  - Provide appropriate education and resources to patient and/or family  - Initiate referral to age appropriate protective services, i e  [facility] or Child Protective Services  - Offer patient/caregiver the option to [facility] patient [employer]  - Provide emotional support, including active listening and acknowledgment of concerns  - Provide the patient with information about supportive services i e  shelters, community resources for domestic violence  Outcome: Progressing     Problem: SUBSTANCE USE/ABUSE  Goal: Will have no detox symptoms and will verbalize plan for changing substance-related behavior  Description: INTERVENTIONS:  - Monitor physical status and assess for symptoms of withdrawal  - Administer medication as ordered  - Provide emotional support with 1 on 1 interaction with staff  - Encourage recovery focused program/ addiction education  - Assess for verbalization of changing behaviors related to substance abuse  - Initiate consults and referrals as appropriate (Case Management, Spiritual Care, etc )  Outcome: Progressing  Goal: By discharge, will develop insight into their chemical dependency and sustain motivation to continue in recovery  Description: INTERVENTIONS:  - Attends all daily group sessions and scheduled AA groups  - Actively practices coping skills through participation in the therapeutic community and adherence to program rules  - Reviews and completes assignments from individual treatment plan  - Assist patient development of understanding of their personal cycle of addiction and relapse triggers  Outcome: Progressing  Goal: By discharge, patient will have ongoing treatment plan addressing chemical dependency  Description: INTERVENTIONS:  - Assist patient with resources and/or appointments for ongoing recovery based living  Outcome: Progressing     Problem: SPIRITUAL CARE  Goal: Pt/Family able to move forward in process of forgiving self, others and/or higher power  Description: INTERVENTIONS:  - Assist patient with any spiritual needs/requests such as communion, confession, anointing, etc  - Explore guilt and help patient/family identify possible spiritual/cultural beliefs and values  - Explore possibilities of making amends & reconciliation with self, others, and/or a greater power  - Guide patient/family in identifying painful feelings  - Help patient explore and identify spiritual beliefs, cultural understandings or values that may help or hinder letting go of issue  - Help patient explore feelings of anger, bitterness, resentment, anxiety   Help patient/family identify and examine the situation in which these feelings are experienced  - Help patient/family identify destructive displacement of feelings onto other individuals  - Refer patient to formal counseling and/or to bib community for further support as needed or per request  Outcome: Progressing  Goal: Patient feels balance and connection with others and/or higher power that empowers the self during times of loss, guilt and fear  Description: INTERVENTIONS:  - Create safety for patient through empathic presence and non-judgmental listening  - Encourage patient to explore his/her values, beliefs and/or spiritual images and practices  - Encourage use of breath work, imagery, meditation, relaxation, reiki to ease distress and provide healing  - Encourage use of cultural and spiritual celebrations and rituals  - Facilitate discussion that helps patient sort out spiritual concerns  - Help patient identify where meaning/hope/comfort & strength are in his/her life  - Refer patient to bib community for assistance, as appropriate  - Respond to patient/family need for prayer/ritual/sacrament/ceremony  Outcome: Progressing

## 2022-12-11 NOTE — PROGRESS NOTES
Follow up Consultation    Nephrology   Ni Wilson 80 y o  female MRN: [id number]  Unit/Bed#: [facility] 604-01 Encounter: [id number]      Physician Requesting Consult: Arianna Dan DO        ASSESSMENT/PLAN:  45-year-old female multiple comorbidities including hypertension, hyperlipidemia, osteoarthritis, dementia admitted status post fall on 12/2/2022 through the course of the hospital stay found to have multiple rib fractures and positive for COVID  Nephrology has been consulted for evaluation management of hypernatremia         Acute hypernatremia/CKD stage III:  At risk for ZECHARIAH multifactorial most likely secondary to prerenal azotemia  After review of records In [facility] as well as Care everywhere it appears that the patient has a baseline Creatinine of 0 9-1 1 mg/dL  patient's creatinine today is at 0 99 mg/dL  Patient admitted with serum sodium 140 mEq  Most recent serum sodium 144 mEq, stable and improving  Likely secondary to decreased p o  intake  For now continue IV fluids to D5 water +20 mEq KCl at 90 cc an hour  Once patient tolerates adequate p o  then can discontinue IV fluids  check BMP in a m  Optimize hemodynamic status to avoid delay in renal recovery  Place on a renal diet when allowed diet order  Avoid nephrotoxins, adjust meds to appropriate GFR  Strict I/O  Daily weights  Urinary retention protocol if patient does not have a James  Most likely has underlying CKD secondary to age-related nephron loss plus hypertensive sclerosis  will need to set up patient for follow up with Nephrology as an outpatient post hospitalization  as an outpatient for nephrology patient follows up with no nephrologist  Tried calling patient's nephew again today whose number was listed in the chart unable to get a hold  Reattempted     Blood pressure/normotensive:  current medications: Norvasc 2 5 mg p o  daily  recommendations:  No changes for now  Optimize hemodynamics    Maintain MAP > 65mmHg  Avoid BP fluctuations      H/H/anemia:  most recent hemoglobin at 13 8 g/dL  maintain hemoglobin greater than 8 grams/deciliter     Acid-base electrolytes:  Electrolytes:       Hypernatremia:  Most recent sodium at 144 mEq  See above     Hypokalemia:  Most recent potassium 4 1 mEq  Continue KCl with IV fluids     Hypophosphatemia:  Most recent phosphorus 2 6  Recheck phosphorus level  Solved     Hypermagnesemia:  Most recent magnesium 2 8  Avoid further supplementation     Acid-base:    Most recent bicarb of 28, alkalosis likely secondary to alkalosis        Other medical problems:  Multiple rib fractures status post fall:  Management per primary team   Follow-up with trauma  COVID infection:  Management per primary team   On isolation  Tested + 2022  Follow-up with primary team in terms of goals  On Rocephin, Decadron, baricitinib  Unsure how long patient can stay on IV fluids  High risk of developing hyponatremia again when IV fluids discontinued  Thanks for the consult  Will continue to follow  Please call with questions/ concerns  Above-mentioned orders and Plan in terms of acute hyponatremia was discussed with the team in depth with [facility] poonam Talavera MD, FASN, 2022, 6:02 AM              Objective :   Patient seen and examined in the room with full PPE in place blood pressure stable remains afebrile urine output 750 cc plus  No overt complaints remained stable      PHYSICAL EXAM  /80   Pulse 74   Temp (!) 96 8 °F (36 °C)   Resp 18   Ht 4' 10" (1 473 m)   Wt 56 6 kg (124 lb 12 5 oz)   SpO2 94%   BMI 26 08 kg/m²   Temp (24hrs), Av 7 °F (36 5 °C), Min:96 8 °F (36 °C), Max:98 7 °F (37 1 °C)        Intake/Output Summary (Last 24 hours) at 2022 0602  Last data filed at 2022 0500  Gross per 24 hour   Intake 1940 83 ml   Output 750 ml   Net 1190 83 ml       I/O last 24 hours: In: 2649 2 [P O :240;  I V : 2; IV Piggyback:420]  Out: 750 [Urine:750]      Current Weight: Weight - Scale: 56 6 kg (124 lb 12 5 oz)  First Weight: Weight - Scale: 56 6 kg (124 lb 12 5 oz)  Physical Exam  Vitals and nursing note reviewed  Constitutional:       General: She is not in acute distress  Appearance: Normal appearance  She is normal weight  She is ill-appearing  She is not toxic-appearing or diaphoretic  HENT:      Head: Normocephalic and atraumatic  Mouth/Throat:      Mouth: Mucous membranes are moist       Pharynx: Oropharynx is clear  No oropharyngeal exudate  Eyes:      General: No scleral icterus  Conjunctiva/sclera: Conjunctivae normal    Cardiovascular:      Pulses: Normal pulses  Heart sounds: Normal heart sounds  Pulmonary:      Effort: Pulmonary effort is normal       Breath sounds: No stridor  No wheezing  Comments: Coarse breath sounds  Abdominal:      General: There is no distension  Palpations: Abdomen is soft  There is no mass  Tenderness: There is no abdominal tenderness  Musculoskeletal:         General: No swelling  Cervical back: Normal range of motion and neck supple  No rigidity  Skin:     General: Skin is warm  Coloration: Skin is not jaundiced  Neurological:      General: No focal deficit present  Mental Status: She is alert  She is disoriented  Review of Systems   Unable to perform ROS: Dementia   Constitutional: Positive for fatigue  Negative for chills  HENT: Negative for congestion  Respiratory: Positive for cough  Negative for shortness of breath  Cardiovascular: Negative for leg swelling  Gastrointestinal: Negative for diarrhea  Musculoskeletal: Negative for back pain  Skin: Positive for wound  Neurological: Negative for headaches  Psychiatric/Behavioral: Negative for agitation  All other systems reviewed and are negative        Scheduled Meds:  Current Facility-Administered Medications   Medication Dose Route Frequency Provider Last Rate   • acetaminophen  650 mg Rectal Q4H PRN Edgardo Khan MD     • albuterol  2 puff Inhalation Q4H PRN [facility], DO     • amLODIPine  2 5 mg Oral Daily Bridget Fenton MD     • baricitinib  2 mg Oral Q24H Rolena Last, CRNP     • bimatoprost  1 drop Right Eye HS Anamika Lambert [place], [place]     • dexamethasone  6 mg Intravenous Q24H Rolena Last, CRNP     • docusate sodium  100 mg Oral BID Faby Viveros PA-C     • enoxaparin  30 mg Subcutaneous Q12H [address] Amaya Mcfarland PA-C     • hydrALAZINE  5 mg Intravenous Q6H PRN Eve Singh PA-C     • insulin lispro  1-5 Units Subcutaneous Q6H [address] Rikki Nance PA-C     • labetalol  10 mg Intravenous Q6H PRN Eve Singh PA-C     • lidocaine  1 patch Topical Daily vEe Singh PA-C     • multivitamin-minerals  1 tablet Oral Daily Eve Singh PA-C     • OLANZapine  5 mg Oral HS Eve Singh PA-C     • OLANZapine  2 5 mg Intramuscular Q6H PRN Edgardo Khan MD     • oxyCODONE  2 5 mg Oral Q4H PRN Eve Singh PA-C     • IV infusion builder   Intravenous Continuous Bridget Fenton MD 90 mL/hr at 12/11/22 1895   • senna  1 tablet Oral HS Faby Viveros PA-C         PRN Meds: •  acetaminophen  •  albuterol  •  hydrALAZINE  •  labetalol  •  OLANZapine  •  oxyCODONE    Continuous Infusions:IV infusion builder, , Last Rate: 90 mL/hr at 12/11/22 0029          Invasive Devices:      Invasive Devices     Peripheral Intravenous Line  Duration           Peripheral IV 12/10/22 Left;Upper;Ventral (anterior) Arm 1 day          Drain  Duration           External Urinary Catheter <1 day                  LABORATORY:    Results from last 7 days   Lab Units 12/10/22  2014 12/10/22  0636 12/09/22  0516 12/08/22  0605 12/07/22  0545 12/06/22  0450 12/05/22  1350 12/05/22  0452   WBC Thousand/uL  --  11 73* 13 10* 10 97* 7 60 7 17  --  7 81   HEMOGLOBIN g/dL  --  13 8 12 0 13 0 13 8 13 5  --  14 3   HEMATOCRIT %  --  41 2 37 0 40 7 43 1 40 1  --  41 4   PLATELETS Thousands/uL  --  383 334 316 313 279  --  255   POTASSIUM mmol/L 4 4 3 3* 3 0* 3 6 3 5 2 8* 3 1* 3 7   CHLORIDE mmol/L 114* 112* 112* 119* 122* 119* 119* 118*   CO2 mmol/L 23 27 30 30 27 24 24 22   BUN mg/dL 28* 26* 31* 39* 43* 34* 36* 33*   CREATININE mg/dL 0 77 0 72 0 70 0 80 0 99 0 88 0 99 1 14   CALCIUM mg/dL 7 8* 8 3 8 2* 8 7 9 3 8 8 9 0 9 2   MAGNESIUM mg/dL  --   --  2 2 2 5  --  2 8*  --   --    PHOSPHORUS mg/dL  --  2 6 1 7* 2 2*  --  1 0*  --   --       rest all reviewed    RADIOLOGY:  XR chest portable   Final Result by Vanesa Sarmiento MD (12/07 1212)      Development of mild vascular congestion      No focal pulmonary masses or infiltrates                  Workstation performed: [id number]         XR chest portable   Final Result by Nikky Talavera MD (12/05 1257)   Questionable small left effusion  No focal consolidation  No pneumothorax  Workstation performed: [id number]         TRAUMA - CT head wo contrast   Final Result by Breann Adam DO (12/01 1354)   Addendum (preliminary) 1 of 1 by Breann Adam DO (12/01 1354)   ADDENDUM:      I personally discussed this study with [address] Antonio Han 21 on 12/1/2022    at 1:22 PM          Final   1  No CT evidence of acute intracranial process  2   Soft tissue laceration of the left superolateral periorbital soft tissues with surrounding swelling extending into the left facial soft tissues  Superior scalp hematoma is also noted  Superior scalp hematoma is also noted  3   Age-related volume loss with patchy areas of hypoattenuation within the periventricular and subcortical white matter which are likely the basis of chronic microangiopathy              Workstation performed: [id number]         TRAUMA - CT spine cervical wo contrast   Final Result by Breann Adam DO (12/01 1354)   Addendum (preliminary) 1 of 1 by Breann Adam DO (12/01 1354)   ADDENDUM:      I personally discussed this study with Beulah Han 21 on 12/1/2022    at 1:22 PM          Final   1  No evidence of acute cervical spine fracture or traumatic malalignment  Workstation performed: [id number]         TRAUMA - CT chest abdomen pelvis w contrast   Final Result by Junior White DO (12/01 1404)   Addendum (preliminary) 1 of 1 by Junior White DO (12/01 1404)   ADDENDUM:      The hypodensity within the spleen described in impression #6 is    retrospectively present on prior CT chest of December 2017 and likely    represents a small hemangioma or splenic cyst             Final   1  Multiple mildly displaced/nondisplaced fractures involving the anterolateral aspects of ribs 3 through 7 on the left  No CT evidence of acute traumatic sequelae within elsewhere within the chest, abdomen, or pelvis  2   Soft tissue lesion within the right kidney measuring 2 5 cm, highly concerning for renal carcinoma  Urology evaluation is recommended  3   Groundglass changes within the superior segment of the right lower lobe measuring 2 6 x 1 1 cm, suspicious for developing pneumonia  Follow-up chest CT is recommended in 2-3 months to monitor for resolution given the additional findings detailed in    impression #2  There are multiple bilateral pulmonary nodules measuring up to 9 mm which appear stable from prior CT of 2017  4   Colonic diverticulosis without evidence of acute diverticulitis  5   The rectum is moderately distended with fecal material measuring up to 6 5 cm in greatest diameter  No evidence of rectal wall thickening or perirectal inflammatory changes  6   Subcentimeter hypodensity within the spleen is too small to accurately characterize and is of indeterminate clinical sulcus  This can be evaluated with a nonemergent ultrasound as clinically warranted        I personally discussed this study with Beulah Han 21 on 12/1/2022 at 1:22 PM  Workstation performed: [id number]         XR Trauma multiple (SLB/SLRA trauma bay ONLY)   Final Result by Collette Life, MD (12/01 1205)      No acute cardiopulmonary disease within limitations of supine imaging  Workstation performed: [id number]         XR chest 1 view   Final Result by Collette Life, MD (12/01 1407)      No acute cardiopulmonary disease within limitations of supine imaging  Workstation performed: [id number]           Rest all reviewed    Portions of the record may have been created with voice recognition software  Occasional wrong word or "sound a like" substitutions may have occurred due to the inherent limitations of voice recognition software  Read the chart carefully and recognize, using context, where substitutions have occurred  If you have any questions, please contact the dictating provider

## 2022-12-11 NOTE — PHYSICAL THERAPY NOTE
Physical Therapy Progress Note     12/11/22 0825   PT Last Visit   PT Visit Date 12/11/22   Note Type   Note Type Treatment   Pain Assessment   Pain Assessment Tool FLACC   Pain Rating: FLACC (Rest) - Face 0   Pain Rating: FLACC (Rest) - Legs 0   Pain Rating: FLACC (Rest) - Activity 0   Pain Rating: FLACC (Rest) - Cry 0   Pain Rating: FLACC (Rest) - Consolability 0   Score: FLACC (Rest) 0   Pain Rating: FLACC (Activity) - Face 1   Pain Rating: FLACC (Activity) - Legs 1   Pain Rating: FLACC (Activity) - Activity 0   Pain Rating: FLACC (Activity) - Cry 1   Pain Rating: FLACC (Activity) - Consolability 0   Score: FLACC (Activity) 3   Restrictions/Precautions   Other Precautions Cognitive; Chair Alarm; Bed Alarm; Restraints; Fall Risk;Pain;O2;Multiple lines   Subjective   Subjective The patient notes that she is not hungry today as she just ate  Bed Mobility   Supine to Sit 2  Maximal assistance   Additional items Assist x 1; Increased time required;Verbal cues;LE management   Sit to Supine 2  Maximal assistance   Additional items Assist x 1; Increased time required;Verbal cues;LE management   Transfers   Sit to Stand 2  Maximal assistance   Additional items Assist x 1; Increased time required;Verbal cues   Stand to Sit 2  Maximal assistance   Additional items Assist x 1; Increased time required;Verbal cues   Stand pivot 2  Maximal assistance   Additional items Assist x 1; Increased time required;Verbal cues   Ambulation/Elevation   Gait pattern Excessively slow; Step to;Short stride; Inconsistent gracie; Shuffling;Decreased foot clearance; Forward Flexion;Narrow LEO; Improper Weight shift   Gait Assistance 2  Maximal assist   Additional items Assist x 1;Verbal cues; Tactile cues   Assistive Device Rolling walker   Distance 3 feet x 3     Balance   Static Sitting Poor   Dynamic Sitting Poor -   Static Standing Poor -   Ambulatory Poor -   Activity Tolerance   Activity Tolerance Patient tolerated treatment well;Patient limited by fatigue   Exercises   TKR Sitting;10 reps;Bilateral;AAROM  (x2 sets )   Assessment   Prognosis Fair   Problem List Decreased strength;Decreased range of motion;Decreased endurance; Impaired balance;Decreased mobility; Decreased coordination;Decreased cognition; Impaired judgement;Decreased safety awareness;Pain   Assessment The patient was more alert this morning than she was yesterday afternoon  She required increased time to follow instructions, but she did follow all one-step instructions today  Her balance initially demonstrated improvement, but she fatigued throughout the session requiring more assistance  She was incontinent of urine twice during the session as well  She was transferred to the chair after being cleaned and fresh linens were placed  She was returned to bed as she was continually adjusting herself in the chair and sliding down  Positioned her in the reclined cardiac chair position  Will continue to follow  Barriers to Discharge Inaccessible home environment;Decreased caregiver support   Goals   Patient Goals To rest    STG Expiration Date 12/14/22   PT Treatment Day 5   Plan   Treatment/Interventions Functional transfer training;LE strengthening/ROM; Therapeutic exercise;Cognitive reorientation; Endurance training;Equipment eval/education; Bed mobility;Gait training;Elevations   Progress Progressing toward goals   PT Frequency 3-5x/wk   Recommendation   PT Discharge Recommendation Post acute rehabilitation services   AM-PAC Basic Mobility Inpatient   Turning in Bed Without Bedrails 2   Lying on Back to Sitting on Edge of Flat Bed 2   Moving Bed to Chair 2   Standing Up From Chair 2   Walk in Room 2   Climb 3-5 Stairs 1   Basic Mobility Inpatient Raw Score 11   Basic Mobility Standardized Score 30 25   Highest Level Of Mobility   -[facility] Goal 4: Move to chair/commode   -HLM Achieved 5: Stand (1 or more minutes)       An AM-PAC Basic Mobility standardized score less than 40 78 suggests the patient may benefit from discharge to post-acute rehab services      Ryan Howard PTA

## 2022-12-11 NOTE — ASSESSMENT & PLAN NOTE
- O2 as needed, currently requiring supplemental O2 and requirements have decreased over the past 24 hrs    - Currently on 2 L O2 NC, improving  - Contact and airborne precautions   - Pulmonary toilet  - CXR 12/5 Showed small left effusion, No PTX, No focal consolidation   - Repeat CXR 12/7 indicates development of vascular congestion  - COVID Pathway initiated Moderate, treat for 10 days, through 12/12  - Monitor daily labs  - Contributory to acute respiratory failure

## 2022-12-11 NOTE — PLAN OF CARE
Problem: PHYSICAL THERAPY ADULT  Goal: Performs mobility at highest level of function for planned discharge setting  See evaluation for individualized goals  Description: Treatment/Interventions: OT, Spoke to nursing, Gait training, Bed mobility, Patient/family training, Endurance training, LE strengthening/ROM, Functional transfer training  Equipment Recommended:  (TBD)       See flowsheet documentation for full assessment, interventions and recommendations  Outcome: Progressing  Note: Prognosis: Fair  Problem List: Decreased strength, Decreased range of motion, Decreased endurance, Impaired balance, Decreased mobility, Decreased coordination, Decreased cognition, Impaired judgement, Decreased safety awareness, Pain  Assessment: The patient was more alert this morning than she was yesterday afternoon  She required increased time to follow instructions, but she did follow all one-step instructions today  Her balance initially demonstrated improvement, but she fatigued throughout the session requiring more assistance  She was incontinent of urine twice during the session as well  She was transferred to the chair after being cleaned and fresh linens were placed  She was returned to bed as she was continually adjusting herself in the chair and sliding down  Positioned her in the reclined cardiac chair position  Will continue to follow  Barriers to Discharge: Inaccessible home environment, Decreased caregiver support     PT Discharge Recommendation: Post acute rehabilitation services    See flowsheet documentation for full assessment

## 2022-12-11 NOTE — PROGRESS NOTES
[address] [facility]  Progress Note - Amilcar Yu 3/7/1926, 80 y o  female MRN: [id number]  Unit/Bed#: [facility] 604-01 Encounter: [id number]  Primary Care Provider: Emmanuel Garcia MD   Date and time admitted to hospital: 12/1/2022 11:28 AM    Dysphagia  Assessment & Plan  - SLP evaluation and recommendations  - Continue dysphagia 1 diet with pureed with nectar thick liquids  - VBS pending on 12/13 once off of COVID precautions  - Aspiration precautions  - Discussed goals of care with nephew, who is going to talk to the patient's   Patient is to remain level 3 DNR/ DNI but at this time, family is not ready to pursue hospice  As of 12/9, continue to evaluate for progress as she is improving clinically overall  VBS pending on 12/13, per nephew  UTI (urinary tract infection)  Assessment & Plan  - 12/7 UA indicating UTI  -Completed 3-day course of Rocephin-mental status has been reported as improving  -WBC count downtrending     -Continue to monitor for fever or signs of sepsis  - Urinary retention protocol    Acute respiratory failure (HCC)  Assessment & Plan  - In the setting of left sided rib fractures, acute pneumonia from COVID-19 present on admission  -Oxygen requirements continue to improve-2 L today   - Dysphagia diet due to aspiration  - Moderate COVID-19 pathway  - Aggressive pulmonary hygiene  - Rib fx protocol  - Continue to discuss goals of care with nephew- as of 12/9, continue to evaluate for progress as she is improving clinically overall  VBS pending on 12/13; continue level 3 DNR/ DNI    Hypernatremia  Assessment & Plan  - Hypernatremia  - Na improved to 144  - Appreciate Nephrology consult and recommendations  - Continue D5W IV fluids--increased today to 90 mL/HR  - Urine studies completed  - Evidence of dehydration due to poor PO intake/ aspiration  - Discussed goals of care with nephew, who is going to talk to the patient's    Patient is to remain level 3 DNR/ DNI but at this time, family is not ready to pursue hospice  As of 12/9, continue to evaluate for progress as she is improving clinically overall  VBS pending on 12/13, per nephew  COVID  Assessment & Plan  - O2 as needed, currently requiring supplemental O2 and requirements have decreased over the past 24 hrs  - Currently on 2 L O2 NC, improving  - Contact and airborne precautions   - Pulmonary toilet  - CXR 12/5 Showed small left effusion, No PTX, No focal consolidation   - Repeat CXR 12/7 indicates development of vascular congestion  - COVID Pathway initiated Moderate, treat for 10 days, through 12/12  - Monitor daily labs  - Contributory to acute respiratory failure    Dementia ([facility] [address] )  Assessment & Plan  - Geriatrics consult - recs appreciated  - GCS 14 ([id number]) disoriented to time and place  - Restraints secondary to impulsivity  - Re- Directable, follows commands  - no behavioral disturbances currently  - decrease in function since admission, cannot return to living at home per Geriatrics  - Discussed goals of care with nephew, who is going to talk to the patient's   Patient is to remain level 3 DNR/ DNI but at this time, family is not ready to pursue hospice  As of 12/9, continue to evaluate for progress as she is improving clinically overall  VBS pending on 12/13, per nephew  Ambulatory dysfunction  Assessment & Plan  - PT/OT seen patient  - Rehab is recommended    Hypertension  Assessment & Plan  -Continue on amlodipine  - continue IV hydralazine, labetalol if parameters met--blood pressure has been controlled without use of PRNs    * Fracture of multiple ribs of left side  Assessment & Plan  - Left 3-7 rib fractures, present on admission  - S/p fall  - Rib Fracture Protocol   - Pain Control  - Incentive spirometer--patient displays ability to inspire 300 mL, though difficult to assess due to patient's mental status    - Mouth breather and dries out quickly, oral care frequently  - Suction at bedside for productive cough  - Contributing to acute respiratory failure          Bowel Regimen: Colace  VTE Prophylaxis:Sequential compression device (Venodyne)  and Enoxaparin (Lovenox)     Disposition: Pending VBS and improved oral intake  Placement to follow  Subjective   Chief Complaint: "I have to use the ladies room"    Subjective: ROS is limited due to patient's confusion--no complaints or concerns offered  Patient denies being in any pain or discomfort  Objective   Vitals:   Temp:  [96 8 °F (36 °C)-98 7 °F (37 1 °C)] 96 8 °F (36 °C)  HR:  [65-83] 73  Resp:  [16-18] 16  BP: (107-184)/(59-80) 184/77    I/O       12/09 0701  12/10 0700 12/10 0701  12/11 0700 12/11 0701 12/12 0700    P  O  140 240     I V  (mL/kg) 558 3 (9 9) 1430 8 (25 3)     IV Piggyback 150 270     Total Intake(mL/kg) 848 3 (15) 1940 8 (34 3)     Urine (mL/kg/hr) 227 (0 2) 750 (0 6)     Stool 0      Total Output 227 750     Net +621 3 +1190 8            Unmeasured Urine Occurrence 8 x 1 x     Unmeasured Stool Occurrence 0 x 1 x            Physical Exam:   GENERAL APPEARANCE: No acute distress  NEURO: GCS 15 due to confusion  HEENT: Abrasion on left eyebrow  Mouth is dry  CV: RRR, +2 radial and dorsalis pedis pulses, bilateral   LUNGS: Clear to auscultation, bilaterally  GI: Abdomen is soft nontender  : Pelvis stable  MSK: Moving all extremities  No deformities  SKIN: Scattered ecchymosis noted throughout body  Otherwise warm and dry      Invasive Devices     Peripheral Intravenous Line  Duration           Peripheral IV 12/10/22 Left;Upper;Ventral (anterior) Arm 1 day          Drain  Duration           External Urinary Catheter <1 day                 PIC Score  PIC Pain Score: 3 (12/11/2022  3:00 AM)  PIC Incentive Spirometry Score: 2 (12/11/2022  3:00 AM)  PIC Cough Description: 2 (12/11/2022  3:00 AM)  PIC Total Score: 7 (12/11/2022  3:00 AM)       If the Total PIC Score </=5, did you consult APS and evaluate patient for further intervention?: yes      Pain:    Incentive Spirometry  Cough  3 = Controlled  4 = Above goal volume 3 = Strong  2 = Moderate  3 = Goal to alert volume 2 = Weak  1 = Severe  2 = Below alert volume 1 = Absent     1 = Unable to perform IS         Lab Results:   Results: I have personally reviewed all pertinent laboratory/tests results, BMP/CMP:   Lab Results   Component Value Date    SODIUM 144 12/11/2022    K 4 1 12/11/2022     (H) 12/11/2022    CO2 28 12/11/2022    BUN 25 12/11/2022    CREATININE 0 65 12/11/2022    CALCIUM 8 1 (L) 12/11/2022    EGFR 75 12/11/2022    and CBC: No results found for: WBC, HGB, HCT, MCV, PLT, ADJUSTEDWBC, MCH, MCHC, RDW, MPV, NRBC  Imaging: I have personally reviewed pertinent reports       Other Studies: none

## 2022-12-11 NOTE — ASSESSMENT & PLAN NOTE
- In the setting of left sided rib fractures, acute pneumonia from COVID-19 present on admission  -Oxygen requirements continue to improve-2 L today   - Dysphagia diet due to aspiration  - Moderate COVID-19 pathway  - Aggressive pulmonary hygiene  - Rib fx protocol  - Continue to discuss goals of care with nephew- as of 12/9, continue to evaluate for progress as she is improving clinically overall   VBS pending on 12/13; continue level 3 DNR/ DNI

## 2022-12-11 NOTE — ASSESSMENT & PLAN NOTE
- Geriatrics consult - recs appreciated  - GCS 14 ([id number]) disoriented to time and place  - Restraints secondary to impulsivity  - Re- Directable, follows commands  - no behavioral disturbances currently  - decrease in function since admission, cannot return to living at home per Geriatrics  - Discussed goals of care with nephew, who is going to talk to the patient's   Patient is to remain level 3 DNR/ DNI but at this time, family is not ready to pursue hospice  As of 12/9, continue to evaluate for progress as she is improving clinically overall  VBS pending on 12/13, per nephew

## 2022-12-11 NOTE — ASSESSMENT & PLAN NOTE
- Hypernatremia  - Na improved to 144  - Appreciate Nephrology consult and recommendations  - Continue D5W IV fluids--increased today to 90 mL/HR  - Urine studies completed  - Evidence of dehydration due to poor PO intake/ aspiration  - Discussed goals of care with nephew, who is going to talk to the patient's   Patient is to remain level 3 DNR/ DNI but at this time, family is not ready to pursue hospice  As of 12/9, continue to evaluate for progress as she is improving clinically overall  VBS pending on 12/13, per nephew

## 2022-12-11 NOTE — ASSESSMENT & PLAN NOTE
- 12/7 UA indicating UTI  -Completed 3-day course of Rocephin-mental status has been reported as improving  -WBC count downtrending     -Continue to monitor for fever or signs of sepsis    - Urinary retention protocol

## 2022-12-12 PROBLEM — N39.0 UTI (URINARY TRACT INFECTION): Status: RESOLVED | Noted: 2022-12-08 | Resolved: 2022-12-12

## 2022-12-12 LAB
ANION GAP SERPL CALCULATED.3IONS-SCNC: 3 MMOL/L (ref 4–13)
BUN SERPL-MCNC: 24 MG/DL (ref 5–25)
CALCIUM SERPL-MCNC: 8.3 MG/DL (ref 8.3–10.1)
CHLORIDE SERPL-SCNC: 114 MMOL/L (ref 96–108)
CO2 SERPL-SCNC: 25 MMOL/L (ref 21–32)
CREAT SERPL-MCNC: 0.65 MG/DL (ref 0.6–1.3)
GFR SERPL CREATININE-BSD FRML MDRD: 75 ML/MIN/1.73SQ M
GLUCOSE SERPL-MCNC: 110 MG/DL (ref 65–140)
GLUCOSE SERPL-MCNC: 113 MG/DL (ref 65–140)
GLUCOSE SERPL-MCNC: 144 MG/DL (ref 65–140)
GLUCOSE SERPL-MCNC: 163 MG/DL (ref 65–140)
PHOSPHATE SERPL-MCNC: 2.9 MG/DL (ref 2.3–4.1)
POTASSIUM SERPL-SCNC: 4.3 MMOL/L (ref 3.5–5.3)
SODIUM SERPL-SCNC: 142 MMOL/L (ref 135–147)

## 2022-12-12 RX ORDER — DEXTROSE AND POTASSIUM CHLORIDE 5; .15 G/100ML; G/100ML
50 SOLUTION INTRAVENOUS CONTINUOUS
Status: DISCONTINUED | OUTPATIENT
Start: 2022-12-12 | End: 2022-12-14

## 2022-12-12 RX ADMIN — BARICITINIB 2 MG: 2 TABLET, FILM COATED ORAL at 13:58

## 2022-12-12 RX ADMIN — DEXTROSE AND POTASSIUM CHLORIDE 50 ML/HR: 5; .15 SOLUTION INTRAVENOUS at 11:46

## 2022-12-12 RX ADMIN — LIDOCAINE 5% 1 PATCH: 700 PATCH TOPICAL at 09:07

## 2022-12-12 RX ADMIN — AMLODIPINE BESYLATE 2.5 MG: 2.5 TABLET ORAL at 09:07

## 2022-12-12 RX ADMIN — OLANZAPINE 5 MG: 5 TABLET, ORALLY DISINTEGRATING ORAL at 22:17

## 2022-12-12 RX ADMIN — ENOXAPARIN SODIUM 30 MG: 30 INJECTION SUBCUTANEOUS at 17:19

## 2022-12-12 RX ADMIN — ENOXAPARIN SODIUM 30 MG: 30 INJECTION SUBCUTANEOUS at 05:02

## 2022-12-12 RX ADMIN — POTASSIUM CHLORIDE: 2 INJECTION, SOLUTION, CONCENTRATE INTRAVENOUS at 00:13

## 2022-12-12 RX ADMIN — BIMATOPROST 1 DROP: 0.1 SOLUTION/ DROPS OPHTHALMIC at 22:16

## 2022-12-12 RX ADMIN — DEXTROSE AND POTASSIUM CHLORIDE 50 ML/HR: 5; .15 SOLUTION INTRAVENOUS at 13:54

## 2022-12-12 RX ADMIN — INSULIN LISPRO 1 UNITS: 100 INJECTION, SOLUTION INTRAVENOUS; SUBCUTANEOUS at 14:03

## 2022-12-12 RX ADMIN — SENNOSIDES 8.6 MG: 8.6 TABLET, FILM COATED ORAL at 22:26

## 2022-12-12 RX ADMIN — DOCUSATE SODIUM 100 MG: 100 CAPSULE, LIQUID FILLED ORAL at 17:18

## 2022-12-12 RX ADMIN — DOCUSATE SODIUM 100 MG: 100 CAPSULE, LIQUID FILLED ORAL at 09:07

## 2022-12-12 RX ADMIN — Medication 1 TABLET: at 09:07

## 2022-12-12 RX ADMIN — DEXAMETHASONE SODIUM PHOSPHATE 6 MG: 4 INJECTION INTRA-ARTICULAR; INTRALESIONAL; INTRAMUSCULAR; INTRAVENOUS; SOFT TISSUE at 09:15

## 2022-12-12 NOTE — CASE MANAGEMENT
Case Management Discharge Planning Note    Patient name Yves [place]  Location [facility] 018-81 MRN [id number]  : 3/7/1926 Date 2022       Current Admission Date: 2022  Current Admission Diagnosis:Fracture of multiple ribs of left side   Patient Active Problem List    Diagnosis Date Noted   • UTI (urinary tract infection) 2022   • Dysphagia 2022   • Hypernatremia 2022   • Acute respiratory failure ([facility] [address] ) 2022   • Ambulatory dysfunction 2022   • Dementia ([facility] [address] ) 2022   • COVID 2022   • Fracture of multiple ribs of left side 2022   • Hypertension 2017   • Snores 05/15/2017   • Taste absent 2016   • Vitamin D deficiency 2015   • Benign colon polyp 2012   • Osteopenia 2012      LOS (days): 11  Geometric Mean LOS (GMLOS) (days): 4 30  Days to GMLOS:-6 8     OBJECTIVE:  Risk of Unplanned Readmission Score: 18 69         Current admission status: Inpatient   Preferred Pharmacy:   [address] [place] [address] - [id number] [place] [address]  Phone: [phone] Fax: [phone]    Primary Care Provider: Meghan Hunter MD    Primary Insurance: MEDICARE  Secondary Insurance: [facility]    DISCHARGE DETAILS:    Plan for VBS once pt is off COVID precautions   Once this occurs, decision will be made regarding SNF rehab v SNF hospice

## 2022-12-12 NOTE — PLAN OF CARE
Problem: Prexisting or High Potential for Compromised Skin Integrity  Goal: Skin integrity is maintained or improved  Description: INTERVENTIONS:  - Identify patients at risk for skin breakdown  - Assess and monitor skin integrity  - Assess and monitor nutrition and hydration status  - Monitor labs   - Assess for incontinence   - Turn and reposition patient  - Assist with mobility/ambulation  - Relieve pressure over bony prominences  - Avoid friction and shearing  - Provide appropriate hygiene as needed including keeping skin clean and dry  - Evaluate need for skin moisturizer/barrier cream  - Collaborate with interdisciplinary team   - Patient/family teaching  - Consider wound care consult   Outcome: Progressing     Problem: Potential for Falls  Goal: Patient will remain free of falls  Description: INTERVENTIONS:  - Educate patient/family on patient safety including physical limitations  - Instruct patient to call for assistance with activity   - Consult OT/PT to assist with strengthening/mobility   - Keep Call bell within reach  - Keep bed low and locked with side rails adjusted as appropriate  - Keep care items and personal belongings within reach  - Initiate and maintain comfort rounds  - Make Fall Risk Sign visible to staff  - Offer Toileting every  Hours, in advance of need  - Initiate/Maintain alarm  - Obtain necessary fall risk management equipment:   - Apply yellow socks and bracelet for high fall risk patients  - Consider moving patient to room near nurses station  Outcome: Progressing     Problem: MOBILITY - ADULT  Goal: Maintain or return to baseline ADL function  Description: INTERVENTIONS:  -  Assess patient's ability to carry out ADLs; assess patient's baseline for ADL function and identify physical deficits which impact ability to perform ADLs (bathing, care of mouth/teeth, toileting, grooming, dressing, etc )  - Assess/evaluate cause of self-care deficits   - Assess range of motion  - Assess patient's mobility; develop plan if impaired  - Assess patient's need for assistive devices and provide as appropriate  - Encourage maximum independence but intervene and supervise when necessary  - Involve family in performance of ADLs  - Assess for home care needs following discharge   - Consider OT consult to assist with ADL evaluation and planning for discharge  - Provide patient education as appropriate  Outcome: Progressing  Goal: Maintains/Returns to pre admission functional level  Description: INTERVENTIONS:  - Perform BMAT or MOVE assessment daily    - Set and communicate daily mobility goal to care team and patient/family/caregiver  - Collaborate with rehabilitation services on mobility goals if consulted  - Perform Range of Motion  times a day  - Reposition patient every  hours    - Dangle patient times a day  - Stand patient  times a day  - Ambulate patient  times a day  - Out of bed to chair  times a day   - Out of bed for meals  times a day  - Out of bed for toileting  - Record patient progress and toleration of activity level   Outcome: Progressing     Problem: PAIN - ADULT  Goal: Verbalizes/displays adequate comfort level or baseline comfort level  Description: Interventions:  - Encourage patient to monitor pain and request assistance  - Assess pain using appropriate pain scale  - Administer analgesics based on type and severity of pain and evaluate response  - Implement non-pharmacological measures as appropriate and evaluate response  - Consider cultural and social influences on pain and pain management  - Notify physician/advanced practitioner if interventions unsuccessful or patient reports new pain  Outcome: Progressing     Problem: INFECTION - ADULT  Goal: Absence or prevention of progression during hospitalization  Description: INTERVENTIONS:  - Assess and monitor for signs and symptoms of infection  - Monitor lab/diagnostic results  - Monitor all insertion sites, i e  indwelling lines, tubes, and drains  - Monitor endotracheal if appropriate and nasal secretions for changes in amount and color  - [place] appropriate cooling/warming therapies per order  - Administer medications as ordered  - Instruct and encourage patient and family to use good hand hygiene technique  - Identify and instruct in appropriate isolation precautions for identified infection/condition  Outcome: Progressing     Problem: SAFETY ADULT  Goal: Maintains/Returns to pre admission functional level  Description: INTERVENTIONS:  - Perform BMAT or MOVE assessment daily    - Set and communicate daily mobility goal to care team and patient/family/caregiver  - Collaborate with rehabilitation services on mobility goals if consulted  - Perform Range of Motion  times a day  - Reposition patient every  hours    - Dangle patient  times a day  - Stand patient  times a day  - Ambulate patient  times a day  - Out of bed to chair  times a day   - Out of bed for meals times a day  - Out of bed for toileting  - Record patient progress and toleration of activity level   Outcome: Progressing     Problem: DISCHARGE PLANNING  Goal: Discharge to home or other facility with appropriate resources  Description: INTERVENTIONS:  - Identify barriers to discharge w/patient and caregiver  - Arrange for needed discharge resources and transportation as appropriate  - Identify discharge learning needs (meds, wound care, etc )  - Arrange for interpretive services to assist at discharge as needed  - Refer to Case Management Department for coordinating discharge planning if the patient needs post-hospital services based on physician/advanced practitioner order or complex needs related to functional status, cognitive ability, or social support system  Outcome: Progressing     Problem: Knowledge Deficit  Goal: Patient/family/caregiver demonstrates understanding of disease process, treatment plan, medications, and discharge instructions  Description: Complete learning assessment and assess knowledge base  Interventions:  - Provide teaching at level of understanding  - Provide teaching via preferred learning methods  Outcome: Progressing     Problem: Nutrition/Hydration-ADULT  Goal: Nutrient/Hydration intake appropriate for improving, restoring or maintaining nutritional needs  Description: Monitor and assess patient's nutrition/hydration status for malnutrition  Collaborate with interdisciplinary team and initiate plan and interventions as ordered  Monitor patient's weight and dietary intake as ordered or per policy  Utilize nutrition screening tool and intervene as necessary  Determine patient's food preferences and provide high-protein, high-caloric foods as appropriate       INTERVENTIONS:  - Monitor oral intake, urinary output, labs, and treatment plans  - Assess nutrition and hydration status and recommend course of action  - Evaluate amount of meals eaten  - Assist patient with eating if necessary   - Allow adequate time for meals  - Recommend/ encourage appropriate diets, oral nutritional supplements, and vitamin/mineral supplements  - Order, calculate, and assess calorie counts as needed  - Recommend, monitor, and adjust tube feedings and TPN/PPN based on assessed needs  - Assess need for intravenous fluids  - Provide specific nutrition/hydration education as appropriate  - Include patient/family/caregiver in decisions related to nutrition  Outcome: Progressing     Problem: SAFETY,RESTRAINT: NV/NON-SELF DESTRUCTIVE BEHAVIOR  Goal: Remains free of harm/injury (restraint for non violent/non self-detsructive behavior)  Description: INTERVENTIONS:  - Instruct patient/family regarding restraint use   - Assess and monitor physiologic and psychological status   - Provide interventions and comfort measures to meet assessed patient needs   - Identify and implement measures to help patient regain control  - Assess readiness for release of restraint   Outcome: Progressing  Goal: Returns to optimal restraint-free functioning  Description: INTERVENTIONS:  - Assess the patient's behavior and symptoms that indicate continued need for restraint  - Identify and implement measures to help patient regain control  - Assess readiness for release of restraint   Outcome: Progressing     Problem: COPING  Goal: Pt/Family able to verbalize concerns and demonstrate effective coping strategies  Description: INTERVENTIONS:  - Assist patient/family to identify coping skills, available support systems and cultural and spiritual values  - Provide emotional support, including active listening and acknowledgement of concerns of patient and caregivers  - Reduce environmental stimuli, as able  - Provide patient education  - Assess for spiritual pain/suffering and initiate spiritual care, including notification of Pastoral Care or bib based community as needed  - Assess effectiveness of coping strategies  Outcome: Progressing  Goal: Will report anxiety at manageable levels  Description: INTERVENTIONS:  - Administer medication as ordered  - Teach and encourage coping skills  - Provide emotional support  - Assess patient/family for anxiety and ability to cope  Outcome: Progressing     Problem: DEATH & DYING  Goal: Pt/Family communicate acceptance of impending death and expresses psychological comfort and peace  Description: INTERVENTIONS:  - Assess patient/family anxiety and grief process related to end of life issues  - Provide emotional, spiritual and psychosocial support  - Provide information about the patient’s health status with consideration of family and cultural values  - Communicate willingness to discuss death and facilitate grief process  with patient/family as appropriate  - Emphasize sustaining relationships within family system and community, or bib/spiritual traditions  - Initiate Spiritual Care, Pastoral care or other ancillary consults as needed  - Refer to community support groups as appropriate  Outcome: Progressing     Problem: DECISION MAKING  Goal: Pt/Family able to effectively weigh alternatives and participate in decision making related to treatment and care  Description: INTERVENTIONS:  - Identify decision maker  - Determine when there are differences among patient's view, family's view, and healthcare provider's view of patient condition and care goals  - Facilitate patient/family articulation of goals for care  - Help patient/family identify pros/cons of alternative solutions  - Provide information as requested by patient/family  - Respect patient/family rights related to privacy and sharing information   - Serve as a liaison between patient, family and health care team  - Initiate consults as appropriate (Ethics Team, Palliative Care, Family Care Conference, etc )  Outcome: Progressing     Problem: CONFUSION/THOUGHT DISTURBANCE  Goal: Thought disturbances (confusion, delirium, depression, dementia or psychosis) are managed to maintain or return to baseline mental status and functional level  Description: INTERVENTIONS:  - Assess for possible contributors to  thought disturbance, including but not limited to medications, infection, impaired vision or hearing, underlying metabolic abnormalities, dehydration, respiratory compromise,  psychiatric diagnoses and notify attending PHYSICAN/AP  - Monitor and intervene to maintain adequate nutrition, hydration, elimination, sleep and activity  - Decrease environmental stimuli, including noise as appropriate  - Provide frequent contacts to provide refocusing, direction and reassurance as needed  Approach patient calmly with eye contact and at their level    - [place] high risk fall precautions, aspiration precautions and other safety measures, as indicated  - If delirium suspected, notify physician/AP of change in condition and request immediate in-person evaluation  - Pursue consults as appropriate including Geriatric (campus dependent), OT for cognitive evaluation/activity planning, psychiatric, pastoral care, etc   Outcome: Progressing     Problem: BEHAVIOR  Goal: Pt/Family maintain appropriate behavior and adhere to behavioral management agreement, if implemented  Description: INTERVENTIONS:  - Assess the family dynamic   - Encourage verbalization of thoughts and concerns in a socially appropriate manner  - Assess patient/family's coping skills and non-compliant behavior (including use of illegal substances)  - Utilize positive, consistent limit setting strategies supporting safety of patient, staff and others  - Initiate consult with Case Management, Spiritual Care or other ancillary services as appropriate  - If a patient's/visitor's behavior jeopardizes the safety of the patient, staff, or others, refer to organization procedure     - Notify Security of behavior or suspected illegal substances which indicate the need for search of the patient and/or belongings  - Encourage participation in the decision making process about a behavioral management agreement; implement if patient meets criteria  Outcome: Progressing     Problem: ABUSE/NEGLECT  Goal: Pt/Caregiver/Family aware of resources to assist with issues of abuse and neglect  Description: INTERVENTIONS:  - If child abuse and/or neglect is suspected, notify Childline directly  - Assess for level of risk and safety  - Initiate referral to Case Management  - Notify PHYSICIAN/AP and Nursing Supervisor  - Provide appropriate education and resources to patient and/or family  - Initiate referral to age appropriate protective services, i e  [facility] or Child Protective Services  - Offer patient/caregiver the option to [place] [facility] patient [employer]  - Provide emotional support, including active listening and acknowledgment of concerns  - Provide the patient with information about supportive services i e  shelters, community resources for domestic violence  Outcome: Progressing     Problem: SUBSTANCE USE/ABUSE  Goal: Will have no detox symptoms and will verbalize plan for changing substance-related behavior  Description: INTERVENTIONS:  - Monitor physical status and assess for symptoms of withdrawal  - Administer medication as ordered  - Provide emotional support with 1 on 1 interaction with staff  - Encourage recovery focused program/ addiction education  - Assess for verbalization of changing behaviors related to substance abuse  - Initiate consults and referrals as appropriate (Case Management, Spiritual Care, etc )  Outcome: Progressing  Goal: By discharge, will develop insight into their chemical dependency and sustain motivation to continue in recovery  Description: INTERVENTIONS:  - Attends all daily group sessions and scheduled AA groups  - Actively practices coping skills through participation in the therapeutic community and adherence to program rules  - Reviews and completes assignments from individual treatment plan  - Assist patient development of understanding of their personal cycle of addiction and relapse triggers  Outcome: Progressing  Goal: By discharge, patient will have ongoing treatment plan addressing chemical dependency  Description: INTERVENTIONS:  - Assist patient with resources and/or appointments for ongoing recovery based living  Outcome: Progressing     Problem: SPIRITUAL CARE  Goal: Pt/Family able to move forward in process of forgiving self, others and/or higher power  Description: INTERVENTIONS:  - Assist patient with any spiritual needs/requests such as communion, confession, anointing, etc  - Explore guilt and help patient/family identify possible spiritual/cultural beliefs and values  - Explore possibilities of making amends & reconciliation with self, others, and/or a greater power  - Guide patient/family in identifying painful feelings  - Help patient explore and identify spiritual beliefs, cultural understandings or values that may help or hinder letting go of issue  - Help patient explore feelings of anger, bitterness, resentment, anxiety   Help patient/family identify and examine the situation in which these feelings are experienced  - Help patient/family identify destructive displacement of feelings onto other individuals  - Refer patient to formal counseling and/or to bib community for further support as needed or per request  Outcome: Progressing  Goal: Patient feels balance and connection with others and/or higher power that empowers the self during times of loss, guilt and fear  Description: INTERVENTIONS:  - Create safety for patient through empathic presence and non-judgmental listening  - Encourage patient to explore his/her values, beliefs and/or spiritual images and practices  - Encourage use of breath work, imagery, meditation, relaxation, reiki to ease distress and provide healing  - Encourage use of cultural and spiritual celebrations and rituals  - Facilitate discussion that helps patient sort out spiritual concerns  - Help patient identify where meaning/hope/comfort & strength are in his/her life  - Refer patient to bib community for assistance, as appropriate  - Respond to patient/family need for prayer/ritual/sacrament/ceremony  Outcome: Progressing

## 2022-12-12 NOTE — ASSESSMENT & PLAN NOTE
- Geriatrics consult - recs appreciated  - GCS 14 ([id number]) disoriented to time and place  - Restraints secondary to impulsivity  - Re- Direct[employer], follows commands  - no behavioral disturbances currently  - decrease in function since admission, cannot return to living at home per Geriatrics  - Discussed goals of care with nephew, who is going to talk to the patient's   Patient is to remain level 3 DNR/ DNI but at this time, family is not ready to pursue hospice  As of 12/9, continue to evaluate for progress as she is improving clinically overall  VBS pending on 12/13, per nephew

## 2022-12-12 NOTE — OCCUPATIONAL THERAPY NOTE
Occupational Therapy CX NOTE        Patient Name: Gabe Valero  [id number] Date: 12/12/2022 12/12/22 6501   OT Last Visit   OT Visit Date 12/12/22   Note Type   Note type Cancelled Session   Cancel Reasons Other   Additional Comments PT PLANNED FOR VBS TOMORROW; PENDING RESULTS, PLAN FOR REHAB VS HOSPICE LEVEL CARE  WILL HOLD THERAPY AT THIS TIME AND CONT TO FOLLOW IF APPROPRIATE VS DISCONTINUE SERVICES       Chisé Diacik, MOT, OTR/L

## 2022-12-12 NOTE — ASSESSMENT & PLAN NOTE
-Continue on amlodipine  - continue IV hydralazine, labetalol if parameters met--blood pressure has been controlled without use of PRNs

## 2022-12-12 NOTE — PROGRESS NOTES
[address] [facility]  Progress Note - Fili Andrade 3/7/1926, 80 y o  female MRN: [id number]  Unit/Bed#: [facility] 604-01 Encounter: [id number]  Primary Care Provider: Kris Rey MD   Date and time admitted to hospital: 12/1/2022 11:28 AM    Dysphagia  Assessment & Plan  - SLP evaluation and recommendations  - Continue dysphagia 1 diet with pureed with nectar thick liquids  - VBS pending on 12/13 once off of COVID precautions  - Aspiration precautions  - Discussed goals of care with nephew, who is going to talk to the patient's   Patient is to remain level 3 DNR/ DNI but at this time, family is not ready to pursue hospice  As of 12/9, continue to evaluate for progress as she is improving clinically overall  VBS pending on 12/13, per nephew  Acute respiratory failure (HCC)  Assessment & Plan  - In the setting of left sided rib fractures, acute pneumonia from COVID-19 present on admission  -Oxygen requirements continue to improve-2 L today   - Dysphagia diet due to aspiration  - Moderate COVID-19 pathway  - Aggressive pulmonary hygiene  - Rib fx protocol  - Continue to discuss goals of care with nephew- as of 12/9, continue to evaluate for progress as she is improving clinically overall  VBS pending on 12/13; continue level 3 DNR/ DNI    Hypernatremia  Assessment & Plan  - Hypernatremia  - Na improved to 137  - Appreciate Nephrology consult and recommendations  - Continue D5W IV fluids--decreased to 50 mL/h  - Urine studies completed  - Evidence of dehydration due to poor PO intake/ aspiration  - Discussed goals of care with nephew, who is going to talk to the patient's   Patient is to remain level 3 DNR/ DNI but at this time, family is not ready to pursue hospice  As of 12/9, continue to evaluate for progress as she is improving clinically overall  VBS pending on 12/13, per nephew        COVID  Assessment & Plan  - O2 as needed, currently requiring supplemental O2 and requirements have decreased over the past 24 hrs  - Currently on 2 L O2 NC, improving  - Contact and airborne precautions   - Pulmonary toilet  - CXR 12/5 Showed small left effusion, No PTX, No focal consolidation   - Repeat CXR 12/7 indicates development of vascular congestion  - COVID Pathway initiated Moderate, treat for 10 days, through 12/12  - Monitor daily labs  - Contributory to acute respiratory failure    Dementia ([address] )  Assessment & Plan  - Geriatrics consult - recs appreciated  - GCS 14 ([id number]) disoriented to time and place  - Restraints secondary to impulsivity  - Re- Directable, follows commands  - no behavioral disturbances currently  - decrease in function since admission, cannot return to living at home per Geriatrics  - Discussed goals of care with nephew, who is going to talk to the patient's   Patient is to remain level 3 DNR/ DNI but at this time, family is not ready to pursue hospice  As of 12/9, continue to evaluate for progress as she is improving clinically overall  VBS pending on 12/13, per nephew  Ambulatory dysfunction  Assessment & Plan  - PT/OT seen patient  - Rehab is recommended    Hypertension  Assessment & Plan  -Continue on amlodipine  - continue IV hydralazine, labetalol if parameters met--blood pressure has been controlled without use of PRNs    * Fracture of multiple ribs of left side  Assessment & Plan  - Left 3-7 rib fractures, present on admission  - S/p fall  - Rib Fracture Protocol   - Pain Control  - Incentive spirometer--patient displays ability to inspire 300 mL, though difficult to assess due to patient's mental status  - Mouth breather and dries out quickly, oral care frequently  - Suction at bedside for productive cough  - Contributing to acute respiratory failure      UTI (urinary tract infection)-resolved as of 12/12/2022  Assessment & Plan  - 12/7 UA indicating UTI  -Completed 3-day course of Rocephin-mental status has been reported as improving  -WBC count downtrending     -Continue to monitor for fever or signs of sepsis  - Urinary retention protocol        Bowel Regimen: Senna  VTE Prophylaxis:Sequential compression device (Venodyne)  and Enoxaparin (Lovenox)     Disposition: Pending VBS study tomorrow    Subjective   Chief Complaint: "I feel okay"    Subjective: Patient denies any complaints today  She denies any pain or discomfort  ROS is limited due to dementia     Objective   Vitals:   Temp:  [96 °F (35 6 °C)-97 6 °F (36 4 °C)] 97 6 °F (36 4 °C)  HR:  [75-94] 94  Resp:  [16-19] 19  BP: (137-165)/() 147/70    I/O       12/10 0701 12/11 0700 12/11 0701 12/12 0700 12/12 0701 12/13 0700    P  O  240 0     I V  (mL/kg) 1430 8 (25 3) 2065 5 (36 4)     IV Piggyback 270      Total Intake(mL/kg) 1940 8 (34 3) 2065 5 (36 4)     Urine (mL/kg/hr) 750 (0 6) 1754 (1 3) 600 (1 2)    Stool       Total Output 750 1754 600    Net +1190 8 +311 5 -600           Unmeasured Urine Occurrence 1 x 2 x     Unmeasured Stool Occurrence 1 x             Physical Exam:   GENERAL APPEARANCE: No acute distress  NEURO: GCS 14 due to confusion  HEENT: Abrasion on left side of forehead  Otherwise head is atraumatic  Neck supple  CV: RRR, +2 radial and dorsalis pedis pulses, bilaterally  LUNGS: Clear to auscultation, bilaterally  GI: Abdomen is soft nontender  : Pelvis stable  MSK: No deformities  SKIN: Warm, Dry      Invasive Devices     Peripheral Intravenous Line  Duration           Peripheral IV 12/11/22 Left;Ventral (anterior) Forearm <1 day          Drain  Duration           External Urinary Catheter 1 day                 PIC Score  PIC Pain Score: 3 (12/12/2022  8:00 AM)  PIC Incentive Spirometry Score: 1 (12/12/2022  8:00 AM)  PIC Cough Description: 2 (12/12/2022  4:00 AM)  PIC Total Score: 7 (12/12/2022  4:00 AM)       If the Total PIC Score </=5, did you consult APS and evaluate patient for further intervention?: yes      Pain:    Incentive Spirometry  Cough  3 = Controlled  4 = Above goal volume 3 = Strong  2 = Moderate  3 = Goal to alert volume 2 = Weak  1 = Severe  2 = Below alert volume 1 = Absent     1 = Unable to perform IS         Lab Results:   Results: I have personally reviewed all pertinent laboratory/tests results, BMP/CMP:   Lab Results   Component Value Date    SODIUM 142 12/12/2022    K 4 3 12/12/2022     (H) 12/12/2022    CO2 25 12/12/2022    BUN 24 12/12/2022    CREATININE 0 65 12/12/2022    CALCIUM 8 3 12/12/2022    EGFR 75 12/12/2022    and CBC: No results found for: WBC, HGB, HCT, MCV, PLT, ADJUSTEDWBC, MCH, MCHC, RDW, MPV, NRBC  Imaging: I have personally reviewed pertinent reports       Other Studies: none

## 2022-12-12 NOTE — PROGRESS NOTES
NEPHROLOGY PROGRESS NOTE   Orquidea Sumner 80 y o  female MRN: [id number]  Unit/Bed#: [facility] 604-01 Encounter: [id number]      ASSESSMENT/PLAN:  1  Hypernatremia: do to poor po intake   · Currently on D5W +20 M EQ KCl at 90 cc/h  · Sodium improved 142  · Decrease fluid rate to 50 cc/h  · Encourage oral fluid intake  2  CKD stage IIIa: Baseline creatinine 0 9-1 1  Likely related to hypertension and age-related nephron loss  3  Hypertension: Blood pressure labile but acceptable today for age  Continue amlodipine 2 5 mg daily  4  Rib fractures status post fall  5  COVID-19 infection    Plan Summary:   • Decrease fluid rate to 50cc/h   • D/c IVF when eating/drinking   • Nephrology will sign off  Please call with further questions  SUBJECTIVE:  Patient opens eyes to voice and quickly falls back to sleep  Very poor po intake   +coughing during exam      OBJECTIVE:  Current Weight: Weight - Scale: 56 8 kg (125 lb 4 8 oz)  Vitals:    12/12/22 0900   BP: 147/70   Pulse: 94   Resp: 19   Temp: 97 6 °F (36 4 °C)   SpO2:        Intake/Output Summary (Last 24 hours) at 12/12/2022 1126  Last data filed at 12/12/2022 1001  Gross per 24 hour   Intake 1119 ml   Output 2354 ml   Net -1235 ml       General:  Ill appearing    Skin:  No rash  Eyes:  Sclerae anicteric, no periorbital edema   ENT:  Dry mucous membranes  Neck:  Trachea midline, symmetric   Chest: coarse breath sounds, improve with coughing   CVS:  Regular rate and rhythm  Abdomen:  Soft, nontender, nondistended  Neuro:  Lethargic   Extremities: no lower extremity edema         Medications:  Scheduled Meds:  Current Facility-Administered Medications   Medication Dose Route Frequency Provider Last Rate   • acetaminophen  650 mg Rectal Q4H PRN Meme Li MD     • albuterol  2 puff Inhalation Q4H PRN Carolebetty Shi DO     • amLODIPine  2 5 mg Oral Daily Bridget Fenton MD     • baricitinib  2 mg Oral Q24H JANIS Alcantar     • bimatoprost  1 drop Right Eye HS Luis Dominguez PA-C     • dexamethasone  6 mg Intravenous Q24H JANIS Sanchez     • dextrose 5 % with KCl 20 mEq/L  90 mL/hr Intravenous Continuous Neyda Bernstein MD     • docusate sodium  100 mg Oral BID Josh Severino PA-C     • enoxaparin  30 mg Subcutaneous Q12H [address] Amaya Mcfarland PA-C     • hydrALAZINE  5 mg Intravenous Q6H PRN Luis Dominguez PA-C     • insulin lispro  1-5 Units Subcutaneous Q6H [address] Yousif Nance PA-C     • labetalol  10 mg Intravenous Q6H PRN Luis Dominguez PA-C     • lidocaine  1 patch Topical Daily Luis Dominguez PA-C     • multivitamin-minerals  1 tablet Oral Daily Luis Dominguez PA-C     • OLANZapine  5 mg Oral HS Luis Dominguez PA-C     • OLANZapine  2 5 mg Intramuscular Q6H PRN Everardo Caldwell MD     • oxyCODONE  2 5 mg Oral Q4H PRN Luis Dominguez PA-C     • senna  1 tablet Oral HS Feli Jaime PA-C         PRN Meds: •  acetaminophen  •  albuterol  •  hydrALAZINE  •  labetalol  •  OLANZapine  •  oxyCODONE    Continuous Infusions:dextrose 5 % with KCl 20 mEq/L, 90 mL/hr        Laboratory Results:  Results from last 7 days   Lab Units 12/12/22  0459 12/11/22  0512 12/10/22  2014 12/10/22  0636 12/09/22  0516 12/08/22  0605 12/07/22  0545 12/06/22  0450   WBC Thousand/uL  --   --   --  11 73* 13 10* 10 97* 7 60 7 17   HEMOGLOBIN g/dL  --   --   --  13 8 12 0 13 0 13 8 13 5   HEMATOCRIT %  --   --   --  41 2 37 0 40 7 43 1 40 1   PLATELETS Thousands/uL  --   --   --  383 334 316 313 279   SODIUM mmol/L 142 144 143 147 147 152* 156* 154*   POTASSIUM mmol/L 4 3 4 1 4 4 3 3* 3 0* 3 6 3 5 2 8*   CHLORIDE mmol/L 114* 114* 114* 112* 112* 119* 122* 119*   CO2 mmol/L 25 28 23 27 30 30 27 24   BUN mg/dL 24 25 28* 26* 31* 39* 43* 34*   CREATININE mg/dL 0 65 0 65 0 77 0 72 0 70 0 80 0 99 0 88   CALCIUM mg/dL 8 3 8 1* 7 8* 8 3 8 2* 8 7 9 3 8 8   MAGNESIUM mg/dL  --   --   --   --  2 2 2 5  --  2 8*   PHOSPHORUS mg/dL 2 9 --   --  2 6 1 7* 2 2*  --  1 0*

## 2022-12-12 NOTE — WOUND OSTOMY CARE
Progress Note - Wound   Jocelyn Bales 80 y o  female MRN: [id number]  [facility] Encounter: [id number]      Assessment:  Wound care to see patient for weekly follow-up visit of L ischial wound  Patient admitted with fracture of multiple L ribs s/p fall and is COVID-19+ History of dementia, fatigue, HTN, HLD, lyme's diease, and OA  Patient seen in bed, alert and oriented to self, dependent for care  Max assist of one with turning  Foam wedges are in use for repositioning  Incontinent of bowel and bladder  Nutrition is following along      B/l heels are dry and intact without redness or wounds  1  B/l sacro-buttock remains dry and intact with blanchable pink/hyperpigmented skin  No maceration noted in the sacro-intergluteal crease  Noted dry and intact sacro-coccygeal dimple  2  L ischium - DTI - is resolved on assessment  Area now presents as intact blanchable pink/hyperpigmented skin and scarring  No open aspects or redness  Area is non-tender with palpation         No induration, fluctuance, odor, warmth/temperature differences, redness, or purulence noted to the above noted skin areas assessed  Patient tolerated assessment well- no s/s of non-verbal pain or discomfort observed during the encounter  Primary nurse aware of plan of care             Plan:   1  Apply hydraguard to b/l heels, buttocks, ischium, and sacrum BID and PRN for prevention and protection  2  Apply skin nourishing cream the entire skin daily for moisture  3  Turn and reposition patient every  2 hours   4  Elevate heels off of bed with pillows to offload pressure   5  Apply EHOB waffle cushion to chair when OOB, if able      Objective:    Vitals: Blood pressure 147/70, pulse 94, temperature 97 6 °F (36 4 °C), temperature source Oral, resp  rate 19, height 4' 10" (1 473 m), weight 56 8 kg (125 lb 4 8 oz), SpO2 97 %, not currently breastfeeding  ,Body mass index is 26 19 kg/m²      Wound care will sign off at this time, please re-consult if needed    Leonor Mandel RN MSN CWON

## 2022-12-12 NOTE — ASSESSMENT & PLAN NOTE
- Hypernatremia  - Na improved to 137  - Appreciate Nephrology consult and recommendations  - Continue D5W IV fluids--decreased to 50 mL/h  - Urine studies completed  - Evidence of dehydration due to poor PO intake/ aspiration  - Discussed goals of care with nephew, who is going to talk to the patient's   Patient is to remain level 3 DNR/ DNI but at this time, family is not ready to pursue hospice  As of 12/9, continue to evaluate for progress as she is improving clinically overall  VBS pending on 12/13, per nephew

## 2022-12-13 ENCOUNTER — APPOINTMENT (INPATIENT)
Dept: RADIOLOGY | Facility: HOSPITAL | Age: 87
End: 2022-12-13

## 2022-12-13 LAB
ANION GAP SERPL CALCULATED.3IONS-SCNC: 7 MMOL/L (ref 4–13)
BUN SERPL-MCNC: 18 MG/DL (ref 5–25)
CALCIUM SERPL-MCNC: 8 MG/DL (ref 8.3–10.1)
CHLORIDE SERPL-SCNC: 112 MMOL/L (ref 96–108)
CO2 SERPL-SCNC: 25 MMOL/L (ref 21–32)
CREAT SERPL-MCNC: 0.73 MG/DL (ref 0.6–1.3)
GFR SERPL CREATININE-BSD FRML MDRD: 69 ML/MIN/1.73SQ M
GLUCOSE SERPL-MCNC: 121 MG/DL (ref 65–140)
GLUCOSE SERPL-MCNC: 127 MG/DL (ref 65–140)
GLUCOSE SERPL-MCNC: 129 MG/DL (ref 65–140)
GLUCOSE SERPL-MCNC: 164 MG/DL (ref 65–140)
GLUCOSE SERPL-MCNC: 77 MG/DL (ref 65–140)
GLUCOSE SERPL-MCNC: 82 MG/DL (ref 65–140)
POTASSIUM SERPL-SCNC: 4.2 MMOL/L (ref 3.5–5.3)
SODIUM SERPL-SCNC: 144 MMOL/L (ref 135–147)

## 2022-12-13 RX ORDER — AMLODIPINE BESYLATE 5 MG/1
5 TABLET ORAL DAILY
Status: DISCONTINUED | OUTPATIENT
Start: 2022-12-14 | End: 2022-12-15 | Stop reason: HOSPADM

## 2022-12-13 RX ADMIN — ENOXAPARIN SODIUM 30 MG: 30 INJECTION SUBCUTANEOUS at 17:22

## 2022-12-13 RX ADMIN — DEXAMETHASONE SODIUM PHOSPHATE 6 MG: 4 INJECTION INTRA-ARTICULAR; INTRALESIONAL; INTRAMUSCULAR; INTRAVENOUS; SOFT TISSUE at 09:44

## 2022-12-13 RX ADMIN — BARICITINIB 2 MG: 2 TABLET, FILM COATED ORAL at 11:35

## 2022-12-13 RX ADMIN — LIDOCAINE 5% 1 PATCH: 700 PATCH TOPICAL at 08:50

## 2022-12-13 RX ADMIN — SENNOSIDES 8.6 MG: 8.6 TABLET, FILM COATED ORAL at 21:20

## 2022-12-13 RX ADMIN — Medication 1 TABLET: at 08:51

## 2022-12-13 RX ADMIN — AMLODIPINE BESYLATE 2.5 MG: 2.5 TABLET ORAL at 08:51

## 2022-12-13 RX ADMIN — LABETALOL HYDROCHLORIDE 10 MG: 5 INJECTION, SOLUTION INTRAVENOUS at 00:36

## 2022-12-13 RX ADMIN — BIMATOPROST 1 DROP: 0.1 SOLUTION/ DROPS OPHTHALMIC at 21:20

## 2022-12-13 RX ADMIN — DOCUSATE SODIUM 100 MG: 100 CAPSULE, LIQUID FILLED ORAL at 17:22

## 2022-12-13 RX ADMIN — DEXTROSE AND POTASSIUM CHLORIDE 50 ML/HR: 5; .15 SOLUTION INTRAVENOUS at 12:13

## 2022-12-13 RX ADMIN — DOCUSATE SODIUM 100 MG: 100 CAPSULE, LIQUID FILLED ORAL at 08:51

## 2022-12-13 RX ADMIN — ENOXAPARIN SODIUM 30 MG: 30 INJECTION SUBCUTANEOUS at 05:52

## 2022-12-13 RX ADMIN — INSULIN LISPRO 1 UNITS: 100 INJECTION, SOLUTION INTRAVENOUS; SUBCUTANEOUS at 17:24

## 2022-12-13 RX ADMIN — OLANZAPINE 5 MG: 5 TABLET, ORALLY DISINTEGRATING ORAL at 21:19

## 2022-12-13 NOTE — ASSESSMENT & PLAN NOTE
- SLP evaluation and recommendations  - Continue dysphagia 1 diet with pureed with nectar thick liquids  - Aspiration precautions  -12/13 VBS pending--calorie count, pending results of the VBS

## 2022-12-13 NOTE — ASSESSMENT & PLAN NOTE
- Hypernatremia--in the setting of dehydration and poor oral intake    - Na improved to 142  - Appreciate Nephrology consult and recommendations  - Continue D5W IV fluids--decreased to 50 mL/h  - Urine studies completed

## 2022-12-13 NOTE — ASSESSMENT & PLAN NOTE
- Left 3-7 rib fractures, present on admission  - S/p fall  - Rib Fracture Protocol   - Pain Control  - Incentive spirometer--patient displays ability to inspire 300 mL, though difficult to assess due to patient's mental status   - Suction at bedside for productive cough  -Follow-up with trauma as needed

## 2022-12-13 NOTE — PLAN OF CARE
Problem: Prexisting or High Potential for Compromised Skin Integrity  Goal: Skin integrity is maintained or improved  Description: INTERVENTIONS:  - Identify patients at risk for skin breakdown  - Assess and monitor skin integrity  - Assess and monitor nutrition and hydration status  - Monitor labs   - Assess for incontinence   - Turn and reposition patient  - Assist with mobility/ambulation  - Relieve pressure over bony prominences  - Avoid friction and shearing  - Provide appropriate hygiene as needed including keeping skin clean and dry  - Evaluate need for skin moisturizer/barrier cream  - Collaborate with interdisciplinary team   - Patient/family teaching  - Consider wound care consult   Outcome: Progressing     Problem: Potential for Falls  Goal: Patient will remain free of falls  Description: INTERVENTIONS:  - Educate patient/family on patient safety including physical limitations  - Instruct patient to call for assistance with activity   - Consult OT/PT to assist with strengthening/mobility   - Keep Call bell within reach  - Keep bed low and locked with side rails adjusted as appropriate  - Keep care items and personal belongings within reach  - Initiate and maintain comfort rounds  - Make Fall Risk Sign visible to staff  - Offer Toileting every *2** Hours, in advance of need  - Initiate/Maintain *bed/chair*alarm  - Obtain necessary fall risk management equipment: - Apply yellow socks and bracelet for high fall risk patients  - Consider moving patient to room near nurses station  Outcome: Progressing     Problem: MOBILITY - ADULT  Goal: Maintain or return to baseline ADL function  Description: INTERVENTIONS:  -  Assess patient's ability to carry out ADLs; assess patient's baseline for ADL function and identify physical deficits which impact ability to perform ADLs (bathing, care of mouth/teeth, toileting, grooming, dressing, etc )  - Assess/evaluate cause of self-care deficits   - Assess range of motion  - Assess patient's mobility; develop plan if impaired  - Assess patient's need for assistive devices and provide as appropriate  - Encourage maximum independence but intervene and supervise when necessary  - Involve family in performance of ADLs  - Assess for home care needs following discharge   - Consider OT consult to assist with ADL evaluation and planning for discharge  - Provide patient education as appropriate  Outcome: Progressing  Goal: Maintains/Returns to pre admission functional level  Description: INTERVENTIONS:  - Perform BMAT or MOVE assessment daily    - Set and communicate daily mobility goal to care team and patient/family/caregiver  - Collaborate with rehabilitation services on mobility goals if consulted  - Perform Range of Motion *3** times a day  - Reposition patient every *2** hours    - Dangle patient *3** times a day  - Stand patient *3** times a day  - Ambulate patient *3** times a day  - Out of bed to chair **3* times a day   - Out of bed for meals *3** times a day  - Out of bed for toileting  - Record patient progress and toleration of activity level   Outcome: Progressing     Problem: PAIN - ADULT  Goal: Verbalizes/displays adequate comfort level or baseline comfort level  Description: Interventions:  - Encourage patient to monitor pain and request assistance  - Assess pain using appropriate pain scale  - Administer analgesics based on type and severity of pain and evaluate response  - Implement non-pharmacological measures as appropriate and evaluate response  - Consider cultural and social influences on pain and pain management  - Notify physician/advanced practitioner if interventions unsuccessful or patient reports new pain  Outcome: Progressing     Problem: INFECTION - ADULT  Goal: Absence or prevention of progression during hospitalization  Description: INTERVENTIONS:  - Assess and monitor for signs and symptoms of infection  - Monitor lab/diagnostic results  - Monitor all insertion sites, i e  indwelling lines, tubes, and drains  - Monitor endotracheal if appropriate and nasal secretions for changes in amount and color  - [place] appropriate cooling/warming therapies per order  - Administer medications as ordered  - Instruct and encourage patient and family to use good hand hygiene technique  - Identify and instruct in appropriate isolation precautions for identified infection/condition  Outcome: Progressing     Problem: SAFETY ADULT  Goal: Maintains/Returns to pre admission functional level  Description: INTERVENTIONS:  - Perform BMAT or MOVE assessment daily    - Set and communicate daily mobility goal to care team and patient/family/caregiver  - Collaborate with rehabilitation services on mobility goals if consulted  - Perform Range of Motion *3** times a day  - Reposition patient every *2** hours  - Dangle patient **3* times a day  - Stand patient 3 times a day  - Ambulate patient *3** times a day  - Out of bed to chair **3* times a day   - Out of bed for meals **3* times a day  - Out of bed for toileting  - Record patient progress and toleration of activity level   Outcome: Progressing     Problem: Knowledge Deficit  Goal: Patient/family/caregiver demonstrates understanding of disease process, treatment plan, medications, and discharge instructions  Description: Complete learning assessment and assess knowledge base  Interventions:  - Provide teaching at level of understanding  - Provide teaching via preferred learning methods  Outcome: Progressing     Problem: Nutrition/Hydration-ADULT  Goal: Nutrient/Hydration intake appropriate for improving, restoring or maintaining nutritional needs  Description: Monitor and assess patient's nutrition/hydration status for malnutrition  Collaborate with interdisciplinary team and initiate plan and interventions as ordered  Monitor patient's weight and dietary intake as ordered or per policy   Utilize nutrition screening tool and intervene as necessary  Determine patient's food preferences and provide high-protein, high-caloric foods as appropriate       INTERVENTIONS:  - Monitor oral intake, urinary output, labs, and treatment plans  - Assess nutrition and hydration status and recommend course of action  - Evaluate amount of meals eaten  - Assist patient with eating if necessary   - Allow adequate time for meals  - Recommend/ encourage appropriate diets, oral nutritional supplements, and vitamin/mineral supplements  - Order, calculate, and assess calorie counts as needed  - Recommend, monitor, and adjust tube feedings and TPN/PPN based on assessed needs  - Assess need for intravenous fluids  - Provide specific nutrition/hydration education as appropriate  - Include patient/family/caregiver in decisions related to nutrition  Outcome: Progressing     Problem: SAFETY,RESTRAINT: NV/NON-SELF DESTRUCTIVE BEHAVIOR  Goal: Remains free of harm/injury (restraint for non violent/non self-detsructive behavior)  Description: INTERVENTIONS:  - Instruct patient/family regarding restraint use   - Assess and monitor physiologic and psychological status   - Provide interventions and comfort measures to meet assessed patient needs   - Identify and implement measures to help patient regain control  - Assess readiness for release of restraint   Outcome: Completed  Goal: Returns to optimal restraint-free functioning  Description: INTERVENTIONS:  - Assess the patient's behavior and symptoms that indicate continued need for restraint  - Identify and implement measures to help patient regain control  - Assess readiness for release of restraint   Outcome: Completed     Problem: COPING  Goal: Pt/Family able to verbalize concerns and demonstrate effective coping strategies  Description: INTERVENTIONS:  - Assist patient/family to identify coping skills, available support systems and cultural and spiritual values  - Provide emotional support, including active listening and acknowledgement of concerns of patient and caregivers  - Reduce environmental stimuli, as able  - Provide patient education  - Assess for spiritual pain/suffering and initiate spiritual care, including notification of Pastoral Care or bib based community as needed  - Assess effectiveness of coping strategies  Outcome: Progressing  Goal: Will report anxiety at manageable levels  Description: INTERVENTIONS:  - Administer medication as ordered  - Teach and encourage coping skills  - Provide emotional support  - Assess patient/family for anxiety and ability to cope  Outcome: Progressing     Problem: DECISION MAKING  Goal: Pt/Family able to effectively weigh alternatives and participate in decision making related to treatment and care  Description: INTERVENTIONS:  - Identify decision maker  - Determine when there are differences among patient's view, family's view, and healthcare provider's view of patient condition and care goals  - Facilitate patient/family articulation of goals for care  - Help patient/family identify pros/cons of alternative solutions  - Provide information as requested by patient/family  - Respect patient/family rights related to privacy and sharing information   - Serve as a liaison between patient, family and health care team  - Initiate consults as appropriate (Ethics Team, Palliative Care, Family Care Conference, etc )  Outcome: Progressing     Problem: CONFUSION/THOUGHT DISTURBANCE  Goal: Thought disturbances (confusion, delirium, depression, dementia or psychosis) are managed to maintain or return to baseline mental status and functional level  Description: INTERVENTIONS:  - Assess for possible contributors to  thought disturbance, including but not limited to medications, infection, impaired vision or hearing, underlying metabolic abnormalities, dehydration, respiratory compromise,  psychiatric diagnoses and notify attending PHYSICAN/AP  - Monitor and intervene to maintain adequate nutrition, hydration, elimination, sleep and activity  - Decrease environmental stimuli, including noise as appropriate  - Provide frequent contacts to provide refocusing, direction and reassurance as needed  Approach patient calmly with eye contact and at their level  - [place] high risk fall precautions, aspiration precautions and other safety measures, as indicated  - If delirium suspected, notify physician/AP of change in condition and request immediate in-person evaluation  - Pursue consults as appropriate including Geriatric (campus dependent), OT for cognitive evaluation/activity planning, psychiatric, pastoral care, etc   Outcome: Progressing     Problem: BEHAVIOR  Goal: Pt/Family maintain appropriate behavior and adhere to behavioral management agreement, if implemented  Description: INTERVENTIONS:  - Assess the family dynamic   - Encourage verbalization of thoughts and concerns in a socially appropriate manner  - Assess patient/family's coping skills and non-compliant behavior (including use of illegal substances)  - Utilize positive, consistent limit setting strategies supporting safety of patient, staff and others  - Initiate consult with Case Management, Spiritual Care or other ancillary services as appropriate  - If a patient's/visitor's behavior jeopardizes the safety of the patient, staff, or others, refer to organization procedure     - Notify Security of behavior or suspected illegal substances which indicate the need for search of the patient and/or belongings  - Encourage participation in the decision making process about a behavioral management agreement; implement if patient meets criteria  Outcome: Progressing     Problem: SPIRITUAL CARE  Goal: Pt/Family able to move forward in process of forgiving self, others and/or higher power  Description: INTERVENTIONS:  - Assist patient with any spiritual needs/requests such as communion, confession, anointing, etc  - Explore guilt and help patient/family identify possible spiritual/cultural beliefs and values  - Explore possibilities of making amends & reconciliation with self, others, and/or a greater power  - Guide patient/family in identifying painful feelings  - Help patient explore and identify spiritual beliefs, cultural understandings or values that may help or hinder letting go of issue  - Help patient explore feelings of anger, bitterness, resentment, anxiety   Help patient/family identify and examine the situation in which these feelings are experienced  - Help patient/family identify destructive displacement of feelings onto other individuals  - Refer patient to formal counseling and/or to bib [facility] for further support as needed or per request  Outcome: Progressing  Goal: Patient feels balance and connection with others and/or higher power that empowers the self during times of loss, guilt and fear  Description: INTERVENTIONS:  - Create safety for patient through empathic presence and non-judgmental listening  - Encourage patient to explore his/her values, beliefs and/or spiritual images and practices  - Encourage use of breath work, imagery, meditation, relaxation, reiki to ease distress and provide healing  - Encourage use of cultural and spiritual celebrations and rituals  - Facilitate discussion that helps patient sort out spiritual concerns  - Help patient identify where meaning/hope/comfort & strength are in his/her life  - Refer patient to bib [facility] for assistance, as appropriate  - Respond to patient/family need for prayer/ritual/sacrament/ceremony  Outcome: Progressing

## 2022-12-13 NOTE — PROCEDURES
Video Swallow Study      Patient Name: West Batista  PDDAI'D Date: 12/13/2022        Past Medical History  Past Medical History:   Diagnosis Date   • Anosmia    • Cellulitis    • Diverticulitis, colon    • Fatigue 5/15/2017   • Generalized osteoarthritis of multiple sites    • Hyperlipidemia    • Hypertension    • Impacted cerumen of both ears    • Impaired fasting glucose 7/4/2012   • Lyme disease    • Macular degeneration    • Tremor         Past Surgical History  Past Surgical History:   Procedure Laterality Date   • BREAST BIOPSY     • CATARACT EXTRACTION     • HYSTERECTOMY         Modified (Video) Barium Swallow Study    Summary:  Images are on PACS for review  Oral stage: Moderate impairment maldonado by poor manipulation of solids, poor bolus control & transfers w/ mild BOT residue  Pt is unable to follow commands to use a 2* swallow  Pharyngeal stage:  Mild/mod impairment maldonado by mod  swallow delay, reduced pharyngeal constriction w/ mild pooling at times in the valleculae & PS   +silent aspiration of thin during and after swallows  Unable to clear the material w/ any cues  Per gross esophageal screen:  Clear, wfl   Strategies:  Pt unable to follow any commands  Recommendations:  Diet: puree  Liquids: nectar  Meds: crush as needed   Strategies: alternate the bites w/ sips   Frequent oral care  Upright position  F/u ST tx: as able   Therapy Prognosis:guarded   Prognosis considerations: pt w/ h/o severe dementia  Full Supervision  Aspiration Precautions  Reflux Precautions  Consider consult with: DARIO  Results reviewed with: pt, nursing,physician  Aspiration precautions posted  Repeat VBS as necessary  Goals:  Pt will tolerate least restrictive diet w/out s/s aspiration or oral/pharyngeal difficulties  H&P/pertinent provider notes: (PMH noted above)  Refer to bedside for full infor    Special Studies:  Refer to chart for full information    Does the pt have pain? NA  If yes, was nursing made aware/was it addressed? -      Food allergies: -   Current diet: Puree/ht    Premorbid diet: Regular w/ thin    Dentition: Natural    O2 requirement: RA   Oral mech: fair   Vocal quality/speech: Mildly low volume    Cognitive status:  h/o severe dementia     Precautions:    Pt was viewed sitting upright in the lateral and AP positions  Due to pt's significant confusion and inability to take successive sips trials provided deviated from the MBSImP Validated Protocol  Patient was given 5-mL thin liquid x2, 20-mL cup sip thin, 5-mL nectar thick, 20-mL cup sip nectar thick,  5-mL Honey thick, 5-mL pudding, ½ cookie coated with 3-mL pudding, sip nt by straw and sips of thin by straw  Oral stage:    Lip closure: mildly reduced w loss noted w/ cup rim seal & with straw sip seal   Mastication: severely prolonged, reduced  Bolus formation: mild/mod reduced   Bolus control: mild/mod reduced loss prior to any bolus manipulation  Transfer: mild reduced  Residue: persistent mild to mod post lingual residue  Pharyngeal stage:    Tongue base retraction:mildly reduced  Velar function:fair  Swallow promptness: delayed at least mod, 2nd swallow needed is also delayed  Epiglottic inversion: present, sluggish  Laryngeal elevation/hyoid excursion: fair superior movement, mild reduced anterior  Pharyngeal constriction: functional  Vallecular retention: mild pooling  w/ liquids at times, puree w/ initial attempts to clear  Pyriform retention: pooling after nt/thin  Pt cannot complete commands/2* swallow to clear the pooling     PPW coating:-  Osteophytes:mild throughout but not impairing bolus flow  CP prominence:-  Retropulsion from prominence: -  Transient penetration: with thin by straw  Epiglottic undercoat: mild w/ thin   Penetration: briefly with thin   Airway closure: mildly reduced with liquids  Aspiration: after the swallow with thin by tsp, straw sip (PAS8)  Strategies: pt cannot follow strategies- attempted dry tsp for a 2* swallow  Response to aspiration: aspiration was silent, attempted to cue to cough but pt was unable to follow commands  Screening of Esophageal stage:   WNL  No retention noted    8-Point Penetration-Aspiration Scale   1 Material does not enter the airway   2 Material enters the airway, remains above the vocal folds, and is ejected  from the  airway    3 Material enters the airway, remains above the vocal folds, and is not ejected from the airway   4 Material enters the airway, contacts the vocal folds, and is ejected from the airway   5 Material enters the airway, contacts the vocal folds, and is not ejected from the airway    6 Material enters the airway, passes below the vocal folds and is ejected into the larynx or out of the airway    7 Material enters the airway, passes below the vocal folds, and is not ejected from the trachea despite effort    8 Material enters the airway, passes below the vocal folds, and no effort is made to eject

## 2022-12-13 NOTE — ASSESSMENT & PLAN NOTE
- Geriatrics consult - recs appreciated  - GCS 14 ([id number]) disoriented to time and place  - Restraints secondary to impulsivity--trialing off restraints, though patient has ripped out previous IVs   - Re-directable, follows commands  - no behavioral disturbances currently  - decrease in function since admission, cannot return to living at home per Geriatrics--placement pending APS  -Family not looking to pursue hospice at this time    Patient remains DNR/DNI

## 2022-12-13 NOTE — PROGRESS NOTES
[facility]  Progress Note - Pallavi Armas 3/7/1926, 80 y o  female MRN: [id number]  Unit/Bed#: [facility] 604-01 Encounter: [id number]  Primary Care Provider: Hector Billy MD   Date and time admitted to hospital: 12/1/2022 11:28 AM    Dysphagia  Assessment & Plan  - SLP evaluation and recommendations  - Continue dysphagia 1 diet with pureed with nectar thick liquids  - Aspiration precautions  -12/13 VBS pending--calorie count, pending results of the VBS    Acute respiratory failure ([facility] [address] )  Assessment & Plan  - In the setting of left sided rib fractures, acute pneumonia from COVID-19 present on admission  -Oxygen requirements are minimal   Occasionally needing 2 L but largely on room air   - Dysphagia diet due to aspiration  - Moderate COVID-19 pathway  - Aggressive pulmonary hygiene  - Rib fx protocol    Hypernatremia  Assessment & Plan  - Hypernatremia--in the setting of dehydration and poor oral intake  - Na improved to 142  - Appreciate Nephrology consult and recommendations  - Continue D5W IV fluids--decreased to 50 mL/h  - Urine studies completed      [address]  - Patient completed 10-day quarantine   -Largely asymptomatic  Occasionally requiring 2 L-but normally on room air  Occasional dry cough  -Continue with pulmonary toileting though difficult due to [facility]  - CXR 12/5 Showed small left effusion, No PTX, No focal consolidation   -Patient completing moderate pathway treatment  - Monitor daily labs    Dementia [facility])  Assessment & Plan  - Geriatrics consult - recs appreciated  - GCS 14 ([id number]) disoriented to time and place  - Restraints secondary to impulsivity--trialing off restraints, though patient has ripped out previous IVs   - Re-directable, follows commands  - no behavioral disturbances currently  - decrease in function since admission, cannot return to living at home per Geriatrics--placement pending APS    -Family not looking to pursue hospice at this time   Patient remains DNR/DNI    Ambulatory dysfunction  Assessment & Plan  - PT/OT seen patient  - Rehab is recommended    Hypertension  Assessment & Plan  -She does not take any home medications  Was found to be hypertensive while inpatient  - Will increase amlodipine to 5 mg from 2 5   -Continue to monitor blood pressure  - continue IV hydralazine, labetalol if parameters met--blood pressure has been controlled without use of PRNs    * Fracture of multiple ribs of left side  Assessment & Plan  - Left 3-7 rib fractures, present on admission  - S/p fall  - Rib Fracture Protocol   - Pain Control  - Incentive spirometer--patient displays ability to inspire 300 mL, though difficult to assess due to patient's mental status   - Suction at bedside for productive cough  -Follow-up with trauma as needed  Bowel Regimen: Colace, senna  VTE Prophylaxis:Sequential compression device (Venodyne)  and Enoxaparin (Lovenox)     Disposition: Pending results of VBS    Subjective   Chief Complaint: None offered    Subjective: Patient denies any complaints this morning  ROS limited due to confusion  Objective   Vitals:   Temp:  [96 7 °F (35 9 °C)-98 7 °F (37 1 °C)] 97 7 °F (36 5 °C)  HR:  [72-88] 79  Resp:  [19-20] 19  BP: (162-177)/(71-82) 176/75    I/O       12/11 0701  12/12 0700 12/12 0701  12/13 0700 12/13 0701  12/14 0700    P  O  0  120    I V  (mL/kg) 2065 5 (36 4) 912 5 (16 1)     IV Piggyback       Total Intake(mL/kg) 2065 5 (36 4) 912 5 (16 1) 120 (2 1)    Urine (mL/kg/hr) 1754 (1 3) 1350 (1)     Total Output 1754 1350     Net +311 5 -437 5 +120           Unmeasured Urine Occurrence 2 x 1 x            Physical Exam:   GENERAL APPEARANCE: No acute distress  NEURO: GCS is 14 due to confusion  No lateralizing weakness  HEENT: Abrasion on left eyebrow  Neck supple  CV: RRR, +2 radial and dorsalis pedis pulses, bilaterally  LUNGS: Lungs clear overall bilaterally    Patient is on 1 L nasal cannula saturating 99%--oxygen discontinued  GI: Abdomen is soft nontender  : Pelvis is stable  MSK: Patient ranging all extremities  SKIN: Warm, dry  Invasive Devices     Peripheral Intravenous Line  Duration           Peripheral IV 12/11/22 Left;Ventral (anterior) Forearm 1 day          Drain  Duration           Urethral Catheter 18 Fr  <1 day                 PIC Score  PIC Pain Score: 3 (12/13/2022  5:52 AM)  PIC Incentive Spirometry Score: 1 (12/13/2022  5:52 AM)  PIC Cough Description: 2 (12/13/2022  5:52 AM)  PIC Total Score: 6 (12/13/2022  5:52 AM)       If the Total PIC Score </=5, did you consult APS and evaluate patient for further intervention?: yes      Pain:    Incentive Spirometry  Cough  3 = Controlled  4 = Above goal volume 3 = Strong  2 = Moderate  3 = Goal to alert volume 2 = Weak  1 = Severe  2 = Below alert volume 1 = Absent     1 = Unable to perform IS         Lab Results: Results: I have personally reviewed all pertinent laboratory/tests results, BMP/CMP: No results found for: SODIUM, K, CL, CO2, ANIONGAP, BUN, CREATININE, GLUCOSE, CALCIUM, AST, ALT, ALKPHOS, PROT, BILITOT, EGFR and CBC: No results found for: WBC, HGB, HCT, MCV, PLT, ADJUSTEDWBC, MCH, MCHC, RDW, MPV, NRBC  Imaging: I have personally reviewed pertinent reports       Other Studies: none

## 2022-12-13 NOTE — ASSESSMENT & PLAN NOTE
-She does not take any home medications  Was found to be hypertensive while inpatient  - Will increase amlodipine to 5 mg from 2 5   -Continue to monitor blood pressure    - continue IV hydralazine, labetalol if parameters met--blood pressure has been controlled without use of PRNs

## 2022-12-13 NOTE — ASSESSMENT & PLAN NOTE
- In the setting of left sided rib fractures, acute pneumonia from COVID-19 present on admission  -Oxygen requirements are minimal   Occasionally needing 2 L but largely on room air   - Dysphagia diet due to aspiration  - Moderate COVID-19 pathway  - Aggressive pulmonary hygiene  - Rib fx protocol

## 2022-12-13 NOTE — ASSESSMENT & PLAN NOTE
- Patient completed 10-day quarantine   -Largely asymptomatic  Occasionally requiring 2 L-but normally on room air  Occasional dry cough  -Continue with pulmonary toileting though difficult due to [facility]  - CXR 12/5 Showed small left effusion, No PTX, No focal consolidation   -Patient completing moderate pathway treatment    - Monitor daily labs

## 2022-12-14 PROBLEM — J96.00 ACUTE RESPIRATORY FAILURE (HCC): Status: RESOLVED | Noted: 2022-12-07 | Resolved: 2022-12-14

## 2022-12-14 PROBLEM — E87.0 HYPERNATREMIA: Status: RESOLVED | Noted: 2022-12-07 | Resolved: 2022-12-14

## 2022-12-14 LAB
ANION GAP SERPL CALCULATED.3IONS-SCNC: 2 MMOL/L (ref 4–13)
BASOPHILS # BLD MANUAL: 0 THOUSAND/UL (ref 0–0.1)
BASOPHILS NFR MAR MANUAL: 0 % (ref 0–1)
BUN SERPL-MCNC: 15 MG/DL (ref 5–25)
CALCIUM SERPL-MCNC: 8.2 MG/DL (ref 8.3–10.1)
CHLORIDE SERPL-SCNC: 110 MMOL/L (ref 96–108)
CO2 SERPL-SCNC: 27 MMOL/L (ref 21–32)
CREAT SERPL-MCNC: 0.76 MG/DL (ref 0.6–1.3)
EOSINOPHIL # BLD MANUAL: 0 THOUSAND/UL (ref 0–0.4)
EOSINOPHIL NFR BLD MANUAL: 0 % (ref 0–6)
ERYTHROCYTE [DISTWIDTH] IN BLOOD BY AUTOMATED COUNT: 12.9 % (ref 11.6–15.1)
GFR SERPL CREATININE-BSD FRML MDRD: 66 ML/MIN/1.73SQ M
GLUCOSE SERPL-MCNC: 115 MG/DL (ref 65–140)
GLUCOSE SERPL-MCNC: 152 MG/DL (ref 65–140)
GLUCOSE SERPL-MCNC: 212 MG/DL (ref 65–140)
GLUCOSE SERPL-MCNC: 87 MG/DL (ref 65–140)
GLUCOSE SERPL-MCNC: 90 MG/DL (ref 65–140)
HCT VFR BLD AUTO: 33.2 % (ref 34.8–46.1)
HGB BLD-MCNC: 11 G/DL (ref 11.5–15.4)
LYMPHOCYTES # BLD AUTO: 0.55 THOUSAND/UL (ref 0.6–4.47)
LYMPHOCYTES # BLD AUTO: 4 % (ref 14–44)
MACROCYTES BLD QL AUTO: PRESENT
MCH RBC QN AUTO: 33 PG (ref 26.8–34.3)
MCHC RBC AUTO-ENTMCNC: 33.1 G/DL (ref 31.4–37.4)
MCV RBC AUTO: 100 FL (ref 82–98)
MONOCYTES # BLD AUTO: 0.14 THOUSAND/UL (ref 0–1.22)
MONOCYTES NFR BLD: 1 % (ref 4–12)
NEUTROPHILS # BLD MANUAL: 13.08 THOUSAND/UL (ref 1.85–7.62)
NEUTS SEG NFR BLD AUTO: 95 % (ref 43–75)
PLATELET # BLD AUTO: 453 THOUSANDS/UL (ref 149–390)
PLATELET BLD QL SMEAR: ABNORMAL
PMV BLD AUTO: 10 FL (ref 8.9–12.7)
POTASSIUM SERPL-SCNC: 4.7 MMOL/L (ref 3.5–5.3)
RBC # BLD AUTO: 3.33 MILLION/UL (ref 3.81–5.12)
RBC MORPH BLD: PRESENT
SODIUM SERPL-SCNC: 139 MMOL/L (ref 135–147)
WBC # BLD AUTO: 13.77 THOUSAND/UL (ref 4.31–10.16)

## 2022-12-14 RX ORDER — ACETAMINOPHEN 325 MG/1
650 TABLET ORAL EVERY 6 HOURS SCHEDULED
Status: DISCONTINUED | OUTPATIENT
Start: 2022-12-14 | End: 2022-12-15 | Stop reason: HOSPADM

## 2022-12-14 RX ADMIN — INSULIN LISPRO 1 UNITS: 100 INJECTION, SOLUTION INTRAVENOUS; SUBCUTANEOUS at 18:36

## 2022-12-14 RX ADMIN — ENOXAPARIN SODIUM 30 MG: 30 INJECTION SUBCUTANEOUS at 05:49

## 2022-12-14 RX ADMIN — SENNOSIDES 8.6 MG: 8.6 TABLET, FILM COATED ORAL at 22:17

## 2022-12-14 RX ADMIN — AMLODIPINE BESYLATE 5 MG: 5 TABLET ORAL at 10:11

## 2022-12-14 RX ADMIN — Medication 1 TABLET: at 10:15

## 2022-12-14 RX ADMIN — ENOXAPARIN SODIUM 30 MG: 30 INJECTION SUBCUTANEOUS at 18:36

## 2022-12-14 RX ADMIN — LIDOCAINE 5% 1 PATCH: 700 PATCH TOPICAL at 10:11

## 2022-12-14 RX ADMIN — DEXAMETHASONE SODIUM PHOSPHATE 6 MG: 4 INJECTION INTRA-ARTICULAR; INTRALESIONAL; INTRAMUSCULAR; INTRAVENOUS; SOFT TISSUE at 10:10

## 2022-12-14 RX ADMIN — BIMATOPROST 1 DROP: 0.1 SOLUTION/ DROPS OPHTHALMIC at 22:17

## 2022-12-14 RX ADMIN — DEXTROSE AND POTASSIUM CHLORIDE 50 ML/HR: 5; .15 SOLUTION INTRAVENOUS at 10:09

## 2022-12-14 RX ADMIN — DOCUSATE SODIUM 100 MG: 100 CAPSULE, LIQUID FILLED ORAL at 18:36

## 2022-12-14 RX ADMIN — BARICITINIB 2 MG: 2 TABLET, FILM COATED ORAL at 12:04

## 2022-12-14 RX ADMIN — ACETAMINOPHEN 650 MG: 325 TABLET ORAL at 12:05

## 2022-12-14 RX ADMIN — ACETAMINOPHEN 650 MG: 325 TABLET ORAL at 18:36

## 2022-12-14 RX ADMIN — OLANZAPINE 5 MG: 5 TABLET, ORALLY DISINTEGRATING ORAL at 22:17

## 2022-12-14 RX ADMIN — DOCUSATE SODIUM 100 MG: 100 CAPSULE, LIQUID FILLED ORAL at 10:21

## 2022-12-14 NOTE — ASSESSMENT & PLAN NOTE
- Hypernatremia--in the setting of dehydration and poor oral intake    - Appreciate Nephrology consult and recommendations  - Urine studies completed  - Resolved  - Discontinue IV fluids

## 2022-12-14 NOTE — CASE MANAGEMENT
Case Management Discharge Planning Note    Patient name Jaden Martin  Location [facility] 604/[facility] 567-36 MRN [id number]  : 3/7/1926 Date 2022       Current Admission Date: 2022  Current Admission Diagnosis:Fracture of multiple ribs of left side   Patient Active Problem List    Diagnosis Date Noted   • Dysphagia 2022   • Ambulatory dysfunction 2022   • Dementia ([address] ) 2022   • COVID 2022   • Fracture of multiple ribs of left side 2022   • Hypertension 2017   • Snores 05/15/2017   • Taste absent 2016   • Vitamin D deficiency 2015   • Benign colon polyp 2012   • Osteopenia 2012      LOS (days): 13  Geometric Mean LOS (GMLOS) (days): 4 30  Days to GMLOS:-8 7     OBJECTIVE:  Risk of Unplanned Readmission Score: 23 63         Current admission status: Inpatient   Preferred Pharmacy:   [address] [facility], [address] - [address] [address]  Phone: [phone] Fax: [phone]    Primary Care Provider: Warren Valentine MD    Primary Insurance: MEDICARE  Secondary Insurance: [facility]    DISCHARGE DETAILS:    [facility] can't accept as they would need the pt to have her COVID booster and she isn't eligible to get one due to just having COVID  Pt's accepted to all [facility]  [facility] is willing to take if she will get the booster when she is eligible  Family would like [facility] as first choice and [employer] as back up   CM is waiting on [facility] to inform if their bed is at [facility] or [facility]

## 2022-12-14 NOTE — DISCHARGE INSTR - DIET
VBS 12/13/22 Summary:  Images are on PACS for review  Oral stage: Moderate impairment maldonado by poor manipulation of solids, poor bolus control & transfers w/ mild BOT residue  Pt is unable to follow commands to use a 2* swallow  Pharyngeal stage:  Mild/mod impairment maldonado by mod  swallow delay, reduced pharyngeal constriction w/ mild pooling at times in the valleculae & PS   +silent aspiration of thin during and after swallows  Unable to clear the material w/ any cues  Per gross esophageal screen:  Clear, wfl   Strategies:  Pt unable to follow any commands         Recommendations:  Diet: puree  Liquids: nectar  Meds: crush as needed   Strategies: alternate the bites w/ sips   Frequent oral care

## 2022-12-14 NOTE — PLAN OF CARE
Problem: Prexisting or High Potential for Compromised Skin Integrity  Goal: Skin integrity is maintained or improved  Description: INTERVENTIONS:  - Identify patients at risk for skin breakdown  - Assess and monitor skin integrity  - Assess and monitor nutrition and hydration status  - Monitor labs   - Assess for incontinence   - Turn and reposition patient  - Assist with mobility/ambulation  - Relieve pressure over bony prominences  - Avoid friction and shearing  - Provide appropriate hygiene as needed including keeping skin clean and dry  - Evaluate need for skin moisturizer/barrier cream  - Collaborate with interdisciplinary team   - Patient/family teaching  - Consider wound care consult   12/14/2022 0911 by Minh Cano RN  Outcome: Progressing  12/14/2022 0911 by Minh Cano RN  Outcome: Progressing     Problem: Potential for Falls  Goal: Patient will remain free of falls  Description: INTERVENTIONS:  - Educate patient/family on patient safety including physical limitations  - Instruct patient to call for assistance with activity   - Consult OT/PT to assist with strengthening/mobility   - Keep Call bell within reach  - Keep bed low and locked with side rails adjusted as appropriate  - Keep care items and personal belongings within reach  - Initiate and maintain comfort rounds  - Make Fall Risk Sign visible to staff  - Offer Toileting every 2 Hours, in advance of need  - Initiate/Maintain alarm  - Obtain necessary fall risk management equipment:   - Apply yellow socks and bracelet for high fall risk patients  - Consider moving patient to room near nurses station  12/14/2022 0911 by Minh Cano RN  Outcome: Progressing  12/14/2022 0911 by Minh Cano RN  Outcome: Progressing     Problem: MOBILITY - ADULT  Goal: Maintain or return to baseline ADL function  Description: INTERVENTIONS:  -  Assess patient's ability to carry out ADLs; assess patient's baseline for ADL function and identify physical deficits which impact ability to perform ADLs (bathing, care of mouth/teeth, toileting, grooming, dressing, etc )  - Assess/evaluate cause of self-care deficits   - Assess range of motion  - Assess patient's mobility; develop plan if impaired  - Assess patient's need for assistive devices and provide as appropriate  - Encourage maximum independence but intervene and supervise when necessary  - Involve family in performance of ADLs  - Assess for home care needs following discharge   - Consider OT consult to assist with ADL evaluation and planning for discharge  - Provide patient education as appropriate  12/14/2022 0911 by Karen Ruiz RN  Outcome: Progressing  12/14/2022 0911 by Karen Ruiz RN  Outcome: Progressing  Goal: Maintains/Returns to pre admission functional level  Description: INTERVENTIONS:  - Perform BMAT or MOVE assessment daily    - Set and communicate daily mobility goal to care team and patient/family/caregiver  - Collaborate with rehabilitation services on mobility goals if consulted  - Perform Range of Motion 3 times a day  - Reposition patient every 2 hours    - Dangle patient 3 times a day  - Stand patient 3 times a day  - Ambulate patient 3 times a day  - Out of bed to chair 3 times a day   - Out of bed for meals 3 times a day  - Out of bed for toileting  - Record patient progress and toleration of activity level   12/14/2022 0911 by Karen Ruiz RN  Outcome: Progressing  12/14/2022 0911 by Karen Ruiz RN  Outcome: Progressing     Problem: PAIN - ADULT  Goal: Verbalizes/displays adequate comfort level or baseline comfort level  Description: Interventions:  - Encourage patient to monitor pain and request assistance  - Assess pain using appropriate pain scale  - Administer analgesics based on type and severity of pain and evaluate response  - Implement non-pharmacological measures as appropriate and evaluate response  - Consider cultural and social influences on pain and pain management  - Notify physician/advanced practitioner if interventions unsuccessful or patient reports new pain  12/14/2022 0911 by William Roger RN  Outcome: Progressing  12/14/2022 0911 by William Roger RN  Outcome: Progressing     Problem: INFECTION - ADULT  Goal: Absence or prevention of progression during hospitalization  Description: INTERVENTIONS:  - Assess and monitor for signs and symptoms of infection  - Monitor lab/diagnostic results  - Monitor all insertion sites, i e  indwelling lines, tubes, and drains  - Monitor endotracheal if appropriate and nasal secretions for changes in amount and color  - [place] appropriate cooling/warming therapies per order  - Administer medications as ordered  - Instruct and encourage patient and family to use good hand hygiene technique  - Identify and instruct in appropriate isolation precautions for identified infection/condition  12/14/2022 0911 by William Roger RN  Outcome: Progressing  12/14/2022 0911 by William Roger RN  Outcome: Progressing     Problem: SAFETY ADULT  Goal: Maintains/Returns to pre admission functional level  Description: INTERVENTIONS:  - Perform BMAT or MOVE assessment daily    - Set and communicate daily mobility goal to care team and patient/family/caregiver  - Collaborate with rehabilitation services on mobility goals if consulted  - Perform Range of Motion 3 times a day  - Reposition patient every 2 hours    - Dangle patient 3 times a day  - Stand patient 3 times a day  - Ambulate patient 3 times a day  - Out of bed to chair 3 times a day   - Out of bed for meals 3 times a day  - Out of bed for toileting  - Record patient progress and toleration of activity level   12/14/2022 0911 by William Roger RN  Outcome: Progressing  12/14/2022 0911 by William Roger RN  Outcome: Progressing     Problem: DISCHARGE PLANNING  Goal: Discharge to home or other facility with appropriate resources  Description: INTERVENTIONS:  - Identify barriers to discharge w/patient and caregiver  - Arrange for needed discharge resources and transportation as appropriate  - Identify discharge learning needs (meds, wound care, etc )  - Arrange for interpretive services to assist at discharge as needed  - Refer to Case Management Department for coordinating discharge planning if the patient needs post-hospital services based on physician/advanced practitioner order or complex needs related to functional status, cognitive ability, or social support system  12/14/2022 0911 by Trista Shepherd RN  Outcome: Progressing  12/14/2022 0911 by Trista Shepherd RN  Outcome: Progressing     Problem: Knowledge Deficit  Goal: Patient/family/caregiver demonstrates understanding of disease process, treatment plan, medications, and discharge instructions  Description: Complete learning assessment and assess knowledge base  Interventions:  - Provide teaching at level of understanding  - Provide teaching via preferred learning methods  12/14/2022 0911 by Trista Shepherd RN  Outcome: Progressing  12/14/2022 0911 by Trista Shepherd RN  Outcome: Progressing     Problem: Nutrition/Hydration-ADULT  Goal: Nutrient/Hydration intake appropriate for improving, restoring or maintaining nutritional needs  Description: Monitor and assess patient's nutrition/hydration status for malnutrition  Collaborate with interdisciplinary team and initiate plan and interventions as ordered  Monitor patient's weight and dietary intake as ordered or per policy  Utilize nutrition screening tool and intervene as necessary  Determine patient's food preferences and provide high-protein, high-caloric foods as appropriate       INTERVENTIONS:  - Monitor oral intake, urinary output, labs, and treatment plans  - Assess nutrition and hydration status and recommend course of action  - Evaluate amount of meals eaten  - Assist patient with eating if necessary   - Allow adequate time for meals  - Recommend/ encourage appropriate diets, oral nutritional supplements, and vitamin/mineral supplements  - Order, calculate, and assess calorie counts as needed  - Recommend, monitor, and adjust tube feedings and TPN/PPN based on assessed needs  - Assess need for intravenous fluids  - Provide specific nutrition/hydration education as appropriate  - Include patient/family/caregiver in decisions related to nutrition  12/14/2022 0911 by Ace Jaramillo RN  Outcome: Progressing  12/14/2022 0911 by Ace Jaramillo RN  Outcome: Progressing     Problem: COPING  Goal: Pt/Family able to verbalize concerns and demonstrate effective coping strategies  Description: INTERVENTIONS:  - Assist patient/family to identify coping skills, available support systems and cultural and spiritual values  - Provide emotional support, including active listening and acknowledgement of concerns of patient and caregivers  - Reduce environmental stimuli, as able  - Provide patient education  - Assess for spiritual pain/suffering and initiate spiritual care, including notification of Pastoral Care or bib based community as needed  - Assess effectiveness of coping strategies  12/14/2022 0911 by Ace Jaramillo RN  Outcome: Progressing  12/14/2022 0911 by Ace Jaramillo RN  Outcome: Progressing  Goal: Will report anxiety at manageable levels  Description: INTERVENTIONS:  - Administer medication as ordered  - Teach and encourage coping skills  - Provide emotional support  - Assess patient/family for anxiety and ability to cope  12/14/2022 0911 by Ace Jaramillo RN  Outcome: Progressing  12/14/2022 0911 by Ace Jaramillo RN  Outcome: Progressing     Problem: DEATH & DYING  Goal: Pt/Family communicate acceptance of impending death and expresses psychological comfort and peace  Description: INTERVENTIONS:  - Assess patient/family anxiety and grief process related to end of life issues  - Provide emotional, spiritual and psychosocial support  - Provide information about the patient’s health status with consideration of family and cultural values  - Communicate willingness to discuss death and facilitate grief process  with patient/family as appropriate  - Emphasize sustaining relationships within family system and community, or bib/spiritual traditions  - [address], Pastoral care or other ancillary consults as needed  - Refer to community support groups as appropriate  12/14/2022 0911 by Jennifer Jorgensen RN  Outcome: Progressing  12/14/2022 0911 by Jennifer Jorgensen RN  Outcome: Progressing     Problem: DECISION MAKING  Goal: Pt/Family able to effectively weigh alternatives and participate in decision making related to treatment and care  Description: INTERVENTIONS:  - Identify decision maker  - Determine when there are differences among patient's view, family's view, and healthcare provider's view of patient condition and care goals  - Facilitate patient/family articulation of goals for care  - Help patient/family identify pros/cons of alternative solutions  - Provide information as requested by patient/family  - Respect patient/family rights related to privacy and sharing information   - Serve as a liaison between patient, family and health care team  - Initiate consults as appropriate (Ethics Team, Palliative Care, [facility], etc )  12/14/2022 0911 by Jennifer Jorgensen RN  Outcome: Progressing  12/14/2022 0911 by Jennifer Jorgensen RN  Outcome: Progressing     Problem: CONFUSION/THOUGHT DISTURBANCE  Goal: Thought disturbances (confusion, delirium, depression, dementia or psychosis) are managed to maintain or return to baseline mental status and functional level  Description: INTERVENTIONS:  - Assess for possible contributors to  thought disturbance, including but not limited to medications, infection, impaired vision or hearing, underlying metabolic abnormalities, dehydration, respiratory compromise,  psychiatric diagnoses and notify attending PHYSICAN/AP  - Monitor and intervene to maintain adequate nutrition, hydration, elimination, sleep and activity  - Decrease environmental stimuli, including noise as appropriate  - Provide frequent contacts to provide refocusing, direction and reassurance as needed  Approach patient calmly with eye contact and at their level  - [place] high risk fall precautions, aspiration precautions and other safety measures, as indicated  - If delirium suspected, notify physician/AP of change in condition and request immediate in-person evaluation  - Pursue consults as appropriate including Geriatric (campus dependent), OT for cognitive evaluation/activity planning, psychiatric, pastoral care, etc   12/14/2022 0911 by Ellen River RN  Outcome: Progressing  12/14/2022 0911 by Ellen River RN  Outcome: Progressing     Problem: BEHAVIOR  Goal: Pt/Family maintain appropriate behavior and adhere to behavioral management agreement, if implemented  Description: INTERVENTIONS:  - Assess the family dynamic   - Encourage verbalization of thoughts and concerns in a socially appropriate manner  - Assess patient/family's coping skills and non-compliant behavior (including use of illegal substances)  - Utilize positive, consistent limit setting strategies supporting safety of patient, staff and others  - Initiate consult with Case Management, Spiritual Care or other ancillary services as appropriate  - If a patient's/visitor's behavior jeopardizes the safety of the patient, staff, or others, refer to organization procedure     - Notify Security of behavior or suspected illegal substances which indicate the need for search of the patient and/or belongings  - Encourage participation in the decision making process about a behavioral management agreement; implement if patient meets criteria  12/14/2022 0911 by Ellen River RN  Outcome: Progressing  12/14/2022 0911 by Ellen River RN  Outcome: Progressing     Problem: ABUSE/NEGLECT  Goal: Pt/Caregiver/Family aware of resources to assist with issues of abuse and neglect  Description: INTERVENTIONS:  - If child abuse and/or neglect is suspected, notify Childline directly  - Assess for level of risk and safety  - Initiate referral to Case Management  - Notify PHYSICIAN/AP and Nursing Supervisor  - Provide appropriate education and resources to patient and/or family  - Initiate referral to age appropriate protective services, i e  [facility] or Child Protective Services  - Offer patient/caregiver the option to [facility] [employer]  - Provide emotional support, including active listening and acknowledgment of concerns  - Provide the patient with information about supportive services i e  shelters, community resources for domestic violence  12/14/2022 0911 by Nuha Camacho RN  Outcome: Progressing  12/14/2022 0911 by Nuha Camacho RN  Outcome: Progressing     Problem: SUBSTANCE USE/ABUSE  Goal: Will have no detox symptoms and will verbalize plan for changing substance-related behavior  Description: INTERVENTIONS:  - Monitor physical status and assess for symptoms of withdrawal  - Administer medication as ordered  - Provide emotional support with 1 on 1 interaction with staff  - Encourage recovery focused program/ addiction education  - Assess for verbalization of changing behaviors related to substance abuse  - Initiate consults and referrals as appropriate (Case Management, Spiritual Care, etc )  12/14/2022 0911 by Nuha Camacho RN  Outcome: Progressing  12/14/2022 0911 by Nuha Camacho RN  Outcome: Progressing  Goal: By discharge, will develop insight into their chemical dependency and sustain motivation to continue in recovery  Description: INTERVENTIONS:  - Attends all daily group sessions and scheduled AA groups  - Actively practices coping skills through participation in the therapeutic community and adherence to program rules  - Reviews and completes assignments from individual treatment plan  - Assist patient development of understanding of their personal cycle of addiction and relapse triggers  12/14/2022 0911 by Emily Yanez RN  Outcome: Progressing  12/14/2022 0911 by Emily Yanez RN  Outcome: Progressing  Goal: By discharge, patient will have ongoing treatment plan addressing chemical dependency  Description: INTERVENTIONS:  - Assist patient with resources and/or appointments for ongoing recovery based living  12/14/2022 0911 by Emily Yanez RN  Outcome: Progressing  12/14/2022 0911 by Emily Yanez RN  Outcome: Progressing     Problem: SPIRITUAL CARE  Goal: Pt/Family able to move forward in process of forgiving self, others and/or higher power  Description: INTERVENTIONS:  - Assist patient with any spiritual needs/requests such as communion, confession, anointing, etc  - Explore guilt and help patient/family identify possible spiritual/cultural beliefs and values  - Explore possibilities of making amends & reconciliation with self, others, and/or a greater power  - Guide patient/family in identifying painful feelings  - Help patient explore and identify spiritual beliefs, cultural understandings or values that may help or hinder letting go of issue  - Help patient explore feelings of anger, bitterness, resentment, anxiety   Help patient/family identify and examine the situation in which these feelings are experienced  - Help patient/family identify destructive displacement of feelings onto other individuals  - Refer patient to formal counseling and/or to bib community for further support as needed or per request  12/14/2022 0911 by Emily Yanez RN  Outcome: Progressing  12/14/2022 0911 by Emily Yanez RN  Outcome: Progressing  Goal: Patient feels balance and connection with others and/or higher power that empowers the self during times of loss, guilt and fear  Description: INTERVENTIONS:  - Create safety for patient through empathic presence and non-judgmental listening  - Encourage patient to explore his/her values, beliefs and/or spiritual images and practices  - Encourage use of breath work, imagery, meditation, relaxation, reiki to ease distress and provide healing  - Encourage use of cultural and spiritual celebrations and rituals  - Facilitate discussion that helps patient sort out spiritual concerns  - Help patient identify where meaning/hope/comfort & strength are in his/her life  - Refer patient to bib community for assistance, as appropriate  - Respond to patient/family need for prayer/ritual/sacrament/ceremony  12/14/2022 0911 by Emily Yanez RN  Outcome: Progressing  12/14/2022 0911 by Emily Yanez, RN  Outcome: Progressing

## 2022-12-14 NOTE — PROGRESS NOTES
Progress Note - Geriatric Medicine   Indigo Ramirez 80 y o  female MRN: [id number]  Unit/Bed#: [facility] 604-01 Encounter: [id number]      Assessment/Plan:    COVID-19 infection  -Positive by PCR 12/2/22  -Patient is on COVID pathway, saturating well on room air  -No longer in isolation per protocol    Dementia without behavioral services  -Moderate and progressive  -Patient is pleasantly confused and cooperative at baseline  -Prior to admission requires some assistance with ADLs and IADLs however with progression of underlying disease process as well as further deconditioning debility due to acute infection and prolonged hospitalization is now mostly dependent with all cares  -Given patient's age comorbidities and functional status may require long-term care placement following recovery from acute injuries and completion of short-term rehab    Ambulatory with fall  -Recurrent falls at home leading to admission  -Injuries as outlined below  -Remains high risk recurrent falls, continue precautions   -Maintain environment free of fall hazards  -PT OT following    Left-sided rib fractures  -S/p fall as above  -Acute pain patient is well controlled  -Continue to encourage aggressive pulmonary toilet assess as possible    Oropharyngeal dysphagia  -VBS 12/13 reports no gross aspiration on consistency other than thins  -diet puree with nectar thick as rec by speech, appreciate recs  -Continue aspiration precautions    Hypernatremia  -due to poor oral intake now markedly improved with increased oral intake, no longer requiring IVF hydration    Frailty syndrome in geriatric patient  -Due to age and comorbidities with extremely limited physiologic and metabolic reserve  -May be very sensitive to even mild additional metabolic derangement/stressors  -Continue optimization chronic conditions and address acute derangements as arise    High risk developing delirium   -Continue acute pain control  -Maintain normal circadian rhythm  -Reorient frequently as appropriate  -Maintain calm quiet environment to reduce risk overstimulation    Care coordination: rounded with Cornelius Ochoa (RN)    Subjective: Lorene is seen and examined at bedside where she is sitting resting comfortably, she is pleasantly confused and cooperative, she seems to be in good spirits and offers no acute complaints  Nursing reports no acute events overnight  Review of Systems   Unable to perform ROS: Dementia     Objective:     Vitals: Blood pressure 158/57, pulse 70, temperature 98 3 °F (36 8 °C), temperature source Axillary, resp  rate 18, height 4' 10" (1 473 m), weight 53 6 kg (118 lb 2 7 oz), SpO2 95 %, not currently breastfeeding  ,Body mass index is 24 7 kg/m²  Intake/Output Summary (Last 24 hours) at 12/14/2022 0924  Last data filed at 12/14/2022 8824  Gross per 24 hour   Intake 1192 5 ml   Output 2400 ml   Net -1207 5 ml     Current Medications: Reviewed    Physical Exam:   Physical Exam  Vitals and nursing note reviewed  Constitutional:       Comments: Thin extremely frail elderly female   HENT:      Head: Normocephalic  Comments: Left temporal area ecchymosis improving     Nose: Nose normal       Mouth/Throat:      Mouth: Mucous membranes are dry  Comments: Poor dentition, visible cracked and chipped front teeth  Eyes:      Comments: Left conjunctival hemorrhage improving   Neck:      Comments: Trachea midline, phonation normal  Cardiovascular:      Rate and Rhythm: Normal rate  Pulses: Normal pulses  Pulmonary:      Effort: No respiratory distress  Comments: Coarse breath sounds wet sounding cough  Abdominal:      General: There is no distension  Palpations: Abdomen is soft  Tenderness: There is no abdominal tenderness  Musculoskeletal:      Comments: Diffuse severe subcutaneous fat and muscle wasting   Skin:     General: Skin is warm and dry  Comments:  Thin and friable   Neurological:      Mental Status: She is alert  Comments: Awake and alert, oriented to self, pleasantly confused   Psychiatric:         Mood and Affect: Mood normal          Behavior: Behavior normal       Comments: Pleasant calm and cooperative        Invasive Devices     Peripheral Intravenous Line  Duration           Peripheral IV 12/11/22 Left;Ventral (anterior) Forearm 2 days          Drain  Duration           Urethral Catheter 18 Fr  1 day              Lab, Imaging and other studies: I have personally reviewed pertinent reports

## 2022-12-14 NOTE — CASE MANAGEMENT
Case Management Discharge Planning Note    Patient name Ni Wilson  Location [facility] 604/[facility] 091-11 MRN [id number]  : 3/7/1926 Date 2022       Current Admission Date: 2022  Current Admission Diagnosis:Fracture of multiple ribs of left side   Patient Active Problem List    Diagnosis Date Noted   • Dysphagia 2022   • Ambulatory dysfunction 2022   • Dementia ([address] ) 2022   • COVID 2022   • Fracture of multiple ribs of left side 2022   • Hypertension 2017   • Snores 05/15/2017   • Taste absent 2016   • Vitamin D deficiency 2015   • Benign colon polyp 2012   • Osteopenia 2012      LOS (days): 13  Geometric Mean LOS (GMLOS) (days): 4 30  Days to GMLOS:-8 8     OBJECTIVE:  Risk of Unplanned Readmission Score: 23 63         Current admission status: Inpatient   Preferred Pharmacy:    [facility] [id number]  Phone: [phone] Fax: [phone]    Primary Care Provider: Xochitl Benjamin MD    Primary Insurance: MEDICARE  Secondary Insurance: [facility]    DISCHARGE DETAILS:    [facility] can accept   Their admissions liaison will contact CM in the AM to confirm

## 2022-12-14 NOTE — ASSESSMENT & PLAN NOTE
- COVID positive on 12/2/2022  - Patient completed 10-day quarantine   -Largely asymptomatic  Occasionally requiring 2 L-but normally on room air  Occasional dry cough  -Continue with pulmonary toileting though difficult due to dementia  - CXR 12/5 Showed small left effusion, No PTX, No focal consolidation   - Complete Decadron 10 day trial  - Remdesivir completed  - Baricitinib 4 mg daily x 14 days or until patient is on room air for 24 hrs or more   Pt is currently on room air since last night, anticipate discontinuing baricitinib 12/15 if patient remains on room air

## 2022-12-14 NOTE — ASSESSMENT & PLAN NOTE
- Geriatrics consult - recs appreciated  - GCS 14 (K1A9A5) disoriented to time and place  - Restraints secondary to impulsivity--trialing off restraints, though patient has ripped out previous IVs   - Re-directable, follows commands  - no behavioral disturbances currently  - decrease in function since admission, cannot return to living at home per Geriatrics  -Family not looking to pursue hospice at this time    Patient remains DNR/DNI

## 2022-12-14 NOTE — SPEECH THERAPY NOTE
Speech Language/Pathology    Speech/Language Pathology Progress Note    Patient Name: Roberto Valdes  YXWVC'C Date: 12/14/2022     Problem List  Principal Problem:    Fracture of multiple ribs of left side  Active Problems:    Hypertension    Ambulatory dysfunction    Dementia ([address] )    COVID    Dysphagia       Past Medical History  Past Medical History:   Diagnosis Date   • Anosmia    • Cellulitis    • Diverticulitis, colon    • Fatigue 5/15/2017   • Generalized osteoarthritis of multiple sites    • Hyperlipidemia    • Hypertension    • Impacted cerumen of both ears    • Impaired fasting glucose 7/4/2012   • Lyme disease    • Macular degeneration    • Tremor         Past Surgical History  Past Surgical History:   Procedure Laterality Date   • BREAST BIOPSY     • CATARACT EXTRACTION     • HYSTERECTOMY           Subjective:  "I just have this bc I fell  I do that a lot lately"  Pt more awake, alert  No longer restrained  Current Diet: puree, nt   Objective:  Pt fed by staff for meals  Intake is reduced for meals following breakfast   Slow oral manipulation and some mild holding w/ the material   Mult swallows after each but there is some mild coughing periodically  Pt w/o gross aspiration on VBS on material other than thin  Cough was noted throughout w/o any aspiration  Staff feeds pt slowly and alternates the sips w/ the puree  Not appropriate for liquid upgrades w/o repeat VBS    Assessment: This diet is the least restrictive for this pt at this time  She is unable to follow commands for any strategy use        Plan/Recommendations:  Continue w/ puree/nt for now as baseline until the pt is at rehab and can have repeat vbs when improved  No further f/u while here

## 2022-12-14 NOTE — ASSESSMENT & PLAN NOTE
- SLP evaluation and recommendations  - Aspiration precautions  -12/13 VBS completed, Continue dysphagia 1 diet with pureed with nectar thick liquids  - Pt tolerating a diet, IV fluids discontinued

## 2022-12-14 NOTE — PROGRESS NOTES
[facility]  Progress Note - Vasiliy Hinojosa 3/7/1926, 80 y o  female MRN: [id number]  Unit/Bed#: [facility] 604-01 Encounter: [id number]  Primary Care Provider: Armen Diaz MD   Date and time admitted to hospital: 12/1/2022 11:28 AM    Dysphagia  Assessment & Plan  - SLP evaluation and recommendations  - Aspiration precautions  -12/13 VBS completed, Continue dysphagia 1 diet with pureed with nectar thick liquids  - Pt tolerating a diet, IV fluids discontinued    [address] positive on 12/2/2022  - Patient completed 10-day quarantine   -Largely asymptomatic  Occasionally requiring 2 L-but normally on room air  Occasional dry cough  -Continue with pulmonary toileting though difficult due to dementia  - CXR 12/5 Showed small left effusion, No PTX, No focal consolidation   - Complete Decadron 10 day trial  - Remdesivir completed  - Baricitinib 4 mg daily x 14 days or until patient is on room air for 24 hrs or more  Pt is currently on room air since last night, anticipate discontinuing baricitinib 12/15 if patient remains on room air    Dementia [facility])  Assessment & Plan  - Geriatrics consult - recs appreciated  - GCS 14 ([id number]) disoriented to time and place  - Restraints secondary to impulsivity--trialing off restraints, though patient has ripped out previous IVs   - Re-directable, follows commands  - no behavioral disturbances currently  - decrease in function since admission, cannot return to living at home per Geriatrics  -Family not looking to pursue hospice at this time  Patient remains DNR/DNI    Ambulatory dysfunction  Assessment & Plan  - PT/OT seen patient  - Rehab is recommended    Hypertension  Assessment & Plan  -She does not take any home medications  Was found to be hypertensive while inpatient  - Will increase amlodipine to 5 mg from 2 5   -Continue to monitor blood pressure    - continue IV hydralazine, labetalol if parameters met--blood pressure has been controlled without use of PRNs    * Fracture of multiple ribs of left side  Assessment & Plan  - Left 3-7 rib fractures, present on admission  - S/p fall  - Rib Fracture Protocol   - Pain Control  - Incentive spirometer--patient displays ability to inspire 300 mL, though difficult to assess due to patient's mental status   - Suction at bedside for productive cough  -Follow-up with trauma as needed  Acute respiratory failure (HCC)-resolved as of 12/14/2022  Assessment & Plan  - resolved    Hypernatremia-resolved as of 12/14/2022  Assessment & Plan  - Hypernatremia--in the setting of dehydration and poor oral intake  - Appreciate Nephrology consult and recommendations  - Urine studies completed  - Resolved  - Discontinue IV fluids        Bowel Regimen: senna, colace  VTE Prophylaxis:Sequential compression device (Venodyne)  and Enoxaparin (Lovenox)     Disposition: Continue med surg level of care  Anticipate discharge to rehab within 24-48 hrs pending placement  Subjective   Chief Complaint: "I had to go to the doctor"    Subjective: Patient is a poor historian due to dementia, however she is aware that she was sick and is feeling better  She is pleasantly confused, resting in bed comfortably  Nursing reports patient ate about 75% of her breakfast          Objective   Vitals:   Temp:  [97 7 °F (36 5 °C)-98 3 °F (36 8 °C)] 98 3 °F (36 8 °C)  HR:  [66-82] 70  Resp:  [18] 18  BP: (132-182)/(57-64) 158/57    I/O       12/12 0701  12/13 0700 12/13 0701  12/14 0700 12/14 0701  12/15 0700    P  O   120     I V  (mL/kg) 912 5 (16 1) 1192 5 (22 2) 209 2 (3 9)    Total Intake(mL/kg) 912 5 (16 1) 1312 5 (24 5) 209 2 (3 9)    Urine (mL/kg/hr) 1350 (1) 3700 (2 9)     Total Output 1350 3700     Net -437 5 -2387 5 +209 2           Unmeasured Urine Occurrence 1 x             Physical Exam:   GENERAL APPEARANCE: Patient in no acute distress    HEENT: NCAT; PERRL, EOMs intact; Mucous membranes moist  CV: Regular rate and rhythm; no murmur/gallops/rubs appreciated  CHEST / LUNGS: Clear to auscultation; no wheezes/rales/rhonci  ABD: NABS; soft; non-distended; non-tender  : Voiding  EXT: +2 pulses bilaterally upper & lower extremities; no edema  NEURO: GCS 14 (E6G3T8); no focal neurologic deficits; neurovascularly intact  SKIN: Warm, dry and well perfused; no rash; no jaundice  Invasive Devices     Peripheral Intravenous Line  Duration           Peripheral IV 12/11/22 Left;Ventral (anterior) Forearm 2 days          Drain  Duration           Urethral Catheter 18 Fr  1 day                 PIC Score  PIC Pain Score: 3 (12/13/2022 11:00 AM)  PIC Incentive Spirometry Score: 1 (12/14/2022  4:00 AM)  PIC Cough Description: 2 (12/13/2022 11:00 AM)  PIC Total Score: 6 (12/13/2022 11:00 AM)       If the Total PIC Score </=5, did you consult APS and evaluate patient for further intervention?: no      Pain:    Incentive Spirometry  Cough  3 = Controlled  4 = Above goal volume 3 = Strong  2 = Moderate  3 = Goal to alert volume 2 = Weak  1 = Severe  2 = Below alert volume 1 = Absent     1 = Unable to perform IS         Lab Results: Results: I have personally reviewed all pertinent laboratory/tests results  Imaging: I have personally reviewed pertinent reports  see below  Other Studies:   FL barium swallow video w speech   Final Result by DIANE, DIVINE (12/13 1107)      XR chest portable   Final Result by Radha Kowalski MD (12/07 1212)      Development of mild vascular congestion      No focal pulmonary masses or infiltrates                  Workstation performed: [id number]         XR chest portable   Final Result by Can Chaidez MD (12/05 1257)   Questionable small left effusion  No focal consolidation  No pneumothorax                    Workstation performed: [id number]         TRAUMA - CT head wo contrast   Final Result by Amador Hope DO (12/01 1354)   Addendum (preliminary) 1 of 1 by Cleveland Bautista DO (12/01 1354)   ADDENDUM:      I personally discussed this study with Beulah Han 21 on 12/1/2022    at 1:22 PM          Final   1  No CT evidence of acute intracranial process  2   Soft tissue laceration of the left superolateral periorbital soft tissues with surrounding swelling extending into the left facial soft tissues  Superior scalp hematoma is also noted  Superior scalp hematoma is also noted  3   Age-related volume loss with patchy areas of hypoattenuation within the periventricular and subcortical white matter which are likely the basis of chronic microangiopathy  Workstation performed: [id number]         TRAUMA - CT spine cervical wo contrast   Final Result by Cleveland Bautista DO (12/01 1354)   Addendum (preliminary) 1 of 1 by Cleveland Bautista DO (12/01 1354)   ADDENDUM:      I personally discussed this study with Beulah Han 21 on 12/1/2022    at 1:22 PM          Final   1  No evidence of acute cervical spine fracture or traumatic malalignment  Workstation performed: [id number]         TRAUMA - CT chest abdomen pelvis w contrast   Final Result by Cleveland Bautista DO (12/01 1404)   Addendum (preliminary) 1 of 1 by Cleveland Bautista DO (12/01 1404)   ADDENDUM:      The hypodensity within the spleen described in impression #6 is    retrospectively present on prior CT chest of December 2017 and likely    represents a small hemangioma or splenic cyst             Final   1  Multiple mildly displaced/nondisplaced fractures involving the anterolateral aspects of ribs 3 through 7 on the left  No CT evidence of acute traumatic sequelae within elsewhere within the chest, abdomen, or pelvis  2   Soft tissue lesion within the right kidney measuring 2 5 cm, highly concerning for renal carcinoma  Urology evaluation is recommended        3   Groundglass changes within the superior segment of the right lower lobe measuring 2 6 x 1 1 cm, suspicious for developing pneumonia  Follow-up chest CT is recommended in 2-3 months to monitor for resolution given the additional findings detailed in    impression #2  There are multiple bilateral pulmonary nodules measuring up to 9 mm which appear stable from prior CT of 2017  4   Colonic diverticulosis without evidence of acute diverticulitis  5   The rectum is moderately distended with fecal material measuring up to 6 5 cm in greatest diameter  No evidence of rectal wall thickening or perirectal inflammatory changes  6   Subcentimeter hypodensity within the spleen is too small to accurately characterize and is of indeterminate clinical sulcus  This can be evaluated with a nonemergent ultrasound as clinically warranted  I personally discussed this study with Lissette Li on 12/1/2022 at 1:22 PM                Workstation performed: [id number]         XR Trauma multiple (SLB/SLRA trauma bay ONLY)   Final Result by Mai Monterroso MD (12/01 5425)      No acute cardiopulmonary disease within limitations of supine imaging  Workstation performed: [id number]         XR chest 1 view   Final Result by Mai Monterroso MD (12/01 7950)      No acute cardiopulmonary disease within limitations of supine imaging                 Workstation performed: [id number]

## 2022-12-15 ENCOUNTER — TRANSITIONAL CARE MANAGEMENT (OUTPATIENT)
Dept: FAMILY MEDICINE CLINIC | Facility: CLINIC | Age: 87
End: 2022-12-15

## 2022-12-15 VITALS
DIASTOLIC BLOOD PRESSURE: 57 MMHG | HEART RATE: 66 BPM | SYSTOLIC BLOOD PRESSURE: 150 MMHG | WEIGHT: 119.49 LBS | OXYGEN SATURATION: 97 % | BODY MASS INDEX: 25.08 KG/M2 | TEMPERATURE: 97.9 F | RESPIRATION RATE: 16 BRPM | HEIGHT: 58 IN

## 2022-12-15 LAB
ANION GAP SERPL CALCULATED.3IONS-SCNC: 5 MMOL/L (ref 4–13)
BASOPHILS # BLD AUTO: 0.03 THOUSANDS/ÂΜL (ref 0–0.1)
BASOPHILS NFR BLD AUTO: 0 % (ref 0–1)
BUN SERPL-MCNC: 22 MG/DL (ref 5–25)
CALCIUM SERPL-MCNC: 8.3 MG/DL (ref 8.3–10.1)
CHLORIDE SERPL-SCNC: 114 MMOL/L (ref 96–108)
CO2 SERPL-SCNC: 25 MMOL/L (ref 21–32)
CREAT SERPL-MCNC: 0.66 MG/DL (ref 0.6–1.3)
EOSINOPHIL # BLD AUTO: 0.01 THOUSAND/ÂΜL (ref 0–0.61)
EOSINOPHIL NFR BLD AUTO: 0 % (ref 0–6)
ERYTHROCYTE [DISTWIDTH] IN BLOOD BY AUTOMATED COUNT: 12.8 % (ref 11.6–15.1)
GFR SERPL CREATININE-BSD FRML MDRD: 74 ML/MIN/1.73SQ M
GLUCOSE SERPL-MCNC: 123 MG/DL (ref 65–140)
GLUCOSE SERPL-MCNC: 85 MG/DL (ref 65–140)
HCT VFR BLD AUTO: 39.2 % (ref 34.8–46.1)
HGB BLD-MCNC: 12.8 G/DL (ref 11.5–15.4)
IMM GRANULOCYTES # BLD AUTO: 0.28 THOUSAND/UL (ref 0–0.2)
IMM GRANULOCYTES NFR BLD AUTO: 2 % (ref 0–2)
LYMPHOCYTES # BLD AUTO: 1.01 THOUSANDS/ÂΜL (ref 0.6–4.47)
LYMPHOCYTES NFR BLD AUTO: 9 % (ref 14–44)
MCH RBC QN AUTO: 33 PG (ref 26.8–34.3)
MCHC RBC AUTO-ENTMCNC: 32.7 G/DL (ref 31.4–37.4)
MCV RBC AUTO: 101 FL (ref 82–98)
MONOCYTES # BLD AUTO: 0.54 THOUSAND/ÂΜL (ref 0.17–1.22)
MONOCYTES NFR BLD AUTO: 5 % (ref 4–12)
NEUTROPHILS # BLD AUTO: 9.77 THOUSANDS/ÂΜL (ref 1.85–7.62)
NEUTS SEG NFR BLD AUTO: 84 % (ref 43–75)
NRBC BLD AUTO-RTO: 0 /100 WBCS
PLATELET # BLD AUTO: 462 THOUSANDS/UL (ref 149–390)
PMV BLD AUTO: 10.1 FL (ref 8.9–12.7)
POTASSIUM SERPL-SCNC: 3.9 MMOL/L (ref 3.5–5.3)
RBC # BLD AUTO: 3.88 MILLION/UL (ref 3.81–5.12)
SODIUM SERPL-SCNC: 144 MMOL/L (ref 135–147)
WBC # BLD AUTO: 11.64 THOUSAND/UL (ref 4.31–10.16)

## 2022-12-15 RX ORDER — LIDOCAINE 50 MG/G
1 PATCH TOPICAL DAILY
Refills: 0
Start: 2022-12-16

## 2022-12-15 RX ORDER — DOCUSATE SODIUM 100 MG/1
100 CAPSULE, LIQUID FILLED ORAL 2 TIMES DAILY
Refills: 0
Start: 2022-12-15

## 2022-12-15 RX ORDER — ACETAMINOPHEN 325 MG/1
650 TABLET ORAL EVERY 6 HOURS SCHEDULED
Refills: 0
Start: 2022-12-15 | End: 2022-12-19 | Stop reason: ALTCHOICE

## 2022-12-15 RX ORDER — OLANZAPINE 5 MG/1
5 TABLET, ORALLY DISINTEGRATING ORAL
Refills: 0
Start: 2022-12-15 | End: 2022-12-19 | Stop reason: ALTCHOICE

## 2022-12-15 RX ORDER — AMLODIPINE BESYLATE 5 MG/1
5 TABLET ORAL DAILY
Refills: 0
Start: 2022-12-16

## 2022-12-15 RX ORDER — ALBUTEROL SULFATE 90 UG/1
2 AEROSOL, METERED RESPIRATORY (INHALATION) EVERY 4 HOURS PRN
Refills: 0
Start: 2022-12-15

## 2022-12-15 RX ADMIN — ACETAMINOPHEN 650 MG: 325 TABLET ORAL at 06:48

## 2022-12-15 RX ADMIN — ENOXAPARIN SODIUM 30 MG: 30 INJECTION SUBCUTANEOUS at 06:48

## 2022-12-15 RX ADMIN — AMLODIPINE BESYLATE 5 MG: 5 TABLET ORAL at 09:08

## 2022-12-15 RX ADMIN — LIDOCAINE 5% 1 PATCH: 700 PATCH TOPICAL at 09:19

## 2022-12-15 RX ADMIN — Medication 1 TABLET: at 09:08

## 2022-12-15 RX ADMIN — DEXAMETHASONE SODIUM PHOSPHATE 6 MG: 4 INJECTION INTRA-ARTICULAR; INTRALESIONAL; INTRAMUSCULAR; INTRAVENOUS; SOFT TISSUE at 09:17

## 2022-12-15 RX ADMIN — DOCUSATE SODIUM 100 MG: 100 CAPSULE, LIQUID FILLED ORAL at 09:08

## 2022-12-15 NOTE — DISCHARGE SUMMARY
[address] [facility]  Discharge- [place] 3/7/1926, 80 y o  female MRN: [id number]  Unit/Bed#: [facility] 604-01 Encounter: [id number]  Primary Care Provider: Darylene Doles, MD   Date and time admitted to hospital: 12/1/2022 11:28 AM    Dysphagia  Assessment & Plan  - SLP evaluation and recommendations  - Aspiration precautions  -12/13 VBS completed, Continue dysphagia 1 diet with pureed with nectar thick liquids  - Pt tolerating a diet, IV fluids discontinued    [address] positive on 12/2/2022  - Patient completed 10-day quarantine   -Largely asymptomatic  Occasionally requiring 2 L-but normally on room air  Occasional dry cough  -Continue with pulmonary toileting though difficult due to dementia  - CXR 12/5 Showed small left effusion, No PTX, No focal consolidation   - Complete Decadron 10 day trial  - Remdesivir completed  - Baricitinib 4 mg daily discontinued    Dementia ([address] )  Assessment & Plan  - Geriatrics consult - recs appreciated  - GCS 14 ([id number]) disoriented to time and place  - Restraints secondary to impulsivity--trialing off restraints, though patient has ripped out previous IVs   - Re-directable, follows commands  - no behavioral disturbances currently  - decrease in function since admission, cannot return to living at home per Geriatrics  -Family not looking to pursue hospice at this time  Patient remains DNR/DNI    Ambulatory dysfunction  Assessment & Plan  - PT/OT seen patient  - Rehab is recommended    Hypertension  Assessment & Plan  -She does not take any home medications  Was found to be hypertensive while inpatient  - Will increase amlodipine to 5 mg from 2 5   -Continue to monitor blood pressure    - Follow up with PCP    * Fracture of multiple ribs of left side  Assessment & Plan  - Left 3-7 rib fractures, present on admission  - S/p fall  - Rib Fracture Protocol   - Pain Control  - Incentive spirometer--patient displays ability to inspire 300 mL, though difficult to assess due to patient's mental status   - Suction at bedside for productive cough  -Follow-up with trauma as needed  Hypernatremia-resolved as of 12/14/2022  Assessment & Plan  - Hypernatremia--in the setting of dehydration and poor oral intake  - Appreciate Nephrology consult and recommendations  - Urine studies completed  - Resolved  - Discontinue IV fluids          Bowel Regimen: senna, colace  VTE Prophylaxis:Sequential compression device (Venodyne)  and Enoxaparin (Lovenox)     Disposition: Discharge to SNF    Called patient's nephew and updated him regarding discharge and incidental findings including possible right renal carcinoma  Subjective   Chief Complaint: "I'm fine"    Subjective: Patient is a poor historian due to history of dementia, however reports that she feels fine  She is comfortable in bed and tolerating breakfast      Objective   Vitals:   Temp:  [97 9 °F (36 6 °C)-98 5 °F (36 9 °C)] 97 9 °F (36 6 °C)  HR:  [66-87] 66  Resp:  [16-19] 16  BP: (133-150)/(49-70) 150/57    I/O       12/13 0701  12/14 0700 12/14 0701  12/15 0700 12/15 0701  12/16 0700    P  O  120 118 240    I V  (mL/kg) 1192 5 (22 2) 304 2 (5 6)     Total Intake(mL/kg) 1312 5 (24 5) 422 2 (7 8) 240 (4 4)    Urine (mL/kg/hr) 3700 (2 9) 650 (0 5)     Total Output 3700 650     Net -2387 5 -227 8 +240                Physical Exam:   GENERAL APPEARANCE: Patient in no acute distress  HEENT: Left facial ecchymosis and healing abrasion; PERRL, EOMs intact; Mucous membranes moist  CV: Regular rate and rhythm; no murmur/gallops/rubs appreciated  CHEST / LUNGS: Clear to auscultation; no wheezes/rales/rhonci  ABD: NABS; soft; non-distended; non-tender  : Voiding  EXT: +2 pulses bilaterally upper & lower extremities; no edema  NEURO: GCS 14 ([id number]); no focal neurologic deficits; neurovascularly intact  SKIN: Warm, dry and well perfused; no rash; no jaundice        Invasive Devices Peripheral Intravenous Line  Duration           Peripheral IV 12/11/22 Left;Ventral (anterior) Forearm 3 days          Drain  Duration           Urethral Catheter 18 Fr  2 days               PIC Score  PIC Pain Score: 3 (12/15/2022  6:48 AM)  PIC Incentive Spirometry Score: 1 (12/14/2022  8:00 PM)  PIC Cough Description: 3 (12/14/2022  8:00 PM)  PIC Total Score: 7 (12/14/2022  8:00 PM)       If the Total PIC Score </=5, did you consult APS and evaluate patient for further intervention?: no      Pain:    Incentive Spirometry  Cough  3 = Controlled  4 = Above goal volume 3 = Strong  2 = Moderate  3 = Goal to alert volume 2 = Weak  1 = Severe  2 = Below alert volume 1 = Absent     1 = Unable to perform IS       Lab Results:   Results: I have personally reviewed all pertinent laboratory/tests results, BMP/CMP:   Lab Results   Component Value Date    SODIUM 144 12/15/2022    K 3 9 12/15/2022     (H) 12/15/2022    CO2 25 12/15/2022    BUN 22 12/15/2022    CREATININE 0 66 12/15/2022    CALCIUM 8 3 12/15/2022    EGFR 74 12/15/2022    and CBC:   Lab Results   Component Value Date    WBC 11 64 (H) 12/15/2022    HGB 12 8 12/15/2022    HCT 39 2 12/15/2022     (H) 12/15/2022     (H) 12/15/2022    MCH 33 0 12/15/2022    MCHC 32 7 12/15/2022    RDW 12 8 12/15/2022    MPV 10 1 12/15/2022    NRBC 0 12/15/2022     Imaging/EKG Studies: I have personally reviewed pertinent reports  see below  Other Studies:   FL barium swallow video w speech   Final Result by SYSTEMGENERATED, DOCUMENTATION (12/13 1107)      XR chest portable   Final Result by Agustin Anderson MD (12/07 1212)      Development of mild vascular congestion      No focal pulmonary masses or infiltrates                  Workstation performed: [id number]         XR chest portable   Final Result by Rolly Haider MD (12/05 1257)   Questionable small left effusion  No focal consolidation  No pneumothorax                    Workstation performed: [id number]         TRAUMA - CT head wo contrast   Final Result by Ernst Cohn DO (12/01 1354)   Addendum (preliminary) 1 of 1 by Ernst Cohn DO (12/01 1354)   ADDENDUM:      I personally discussed this study with Beulah Han 21 on 12/1/2022    at 1:22 PM          Final   1  No CT evidence of acute intracranial process  2   Soft tissue laceration of the left superolateral periorbital soft tissues with surrounding swelling extending into the left facial soft tissues  Superior scalp hematoma is also noted  Superior scalp hematoma is also noted  3   Age-related volume loss with patchy areas of hypoattenuation within the periventricular and subcortical white matter which are likely the basis of chronic microangiopathy  Workstation performed: [id number]         TRAUMA - CT spine cervical wo contrast   Final Result by Ernst Cohn DO (12/01 1354)   Addendum (preliminary) 1 of 1 by Ernst Cohn DO (12/01 1354)   ADDENDUM:      I personally discussed this study with Beulah Han 21 on 12/1/2022    at 1:22 PM          Final   1  No evidence of acute cervical spine fracture or traumatic malalignment  Workstation performed: [id number]         TRAUMA - CT chest abdomen pelvis w contrast   Final Result by Ernst Cohn DO (12/01 1404)   Addendum (preliminary) 1 of 1 by Ernst Cohn DO (12/01 1404)   ADDENDUM:      The hypodensity within the spleen described in impression #6 is    retrospectively present on prior CT chest of December 2017 and likely    represents a small hemangioma or splenic cyst             Final   1  Multiple mildly displaced/nondisplaced fractures involving the anterolateral aspects of ribs 3 through 7 on the left  No CT evidence of acute traumatic sequelae within elsewhere within the chest, abdomen, or pelvis        2   Soft tissue lesion within the right kidney measuring 2 5 cm, highly concerning for renal carcinoma  Urology evaluation is recommended  3   Groundglass changes within the superior segment of the right lower lobe measuring 2 6 x 1 1 cm, suspicious for developing pneumonia  Follow-up chest CT is recommended in 2-3 months to monitor for resolution given the additional findings detailed in    impression #2  There are multiple bilateral pulmonary nodules measuring up to 9 mm which appear stable from prior CT of 2017  4   Colonic diverticulosis without evidence of acute diverticulitis  5   The rectum is moderately distended with fecal material measuring up to 6 5 cm in greatest diameter  No evidence of rectal wall thickening or perirectal inflammatory changes  6   Subcentimeter hypodensity within the spleen is too small to accurately characterize and is of indeterminate clinical sulcus  This can be evaluated with a nonemergent ultrasound as clinically warranted  I personally discussed this study with Wes Soni on 12/1/2022 at 1:22 PM                Workstation performed: [id number]         XR Trauma multiple (SLB/SLRA trauma bay ONLY)   Final Result by Pavithra Way MD (12/01 1206)      No acute cardiopulmonary disease within limitations of supine imaging  Workstation performed: [id number]         XR chest 1 view   Final Result by Pavithra Way MD (12/01 7140)      No acute cardiopulmonary disease within limitations of supine imaging                 Workstation performed: [id number]                    Medical Problems     Resolved Problems  Date Reviewed: 12/15/2022          Resolved    Hypernatremia 12/14/2022     Resolved by  Angelo Alvarez PA-C    Acute respiratory failure ([address] ) 12/14/2022     Resolved by  Angelo Alvarez PA-C    UTI (urinary tract infection) 12/12/2022     Resolved by  JANIS Nelson          Admission Date:   Admission Orders (From admission, onward)     Ordered        12/01/22 1340  Inpatient Admission Once                        Admitting Diagnosis: Rib fracture [S22 39XA]  Unspecified multiple injuries, initial encounter [T07  XXXA]    HPI written by Nataliia Maldonado DO 12/1/22 "Alena Partida is a 80 y o  female who presented as a level b trauma s/p fall from standing height at home  Either the neighbor or  called EMS after the patient fell approximately 2 hours ago, as of right now unclear  Possibly down for 2 hours   also confused and poor historian  Patient is confused on presentation to the ED  No PMHX or PSHx because unable to be obtained at this point in time  Was in sinus tachycardia on presentation  Hemodynamically stable "    Procedures Performed:   Orders Placed This Encounter   Procedures   • Fast Ultrasound       Summary of Hospital Course: Please see above and reference hospital medical records    Significant Findings, Care, Treatment and Services Provided: Trauma level B activation and admission after patient fell with head strike  She sustained left-sided rib fractures, scalp hematoma and abrasion  Initial imaging also indicated possible pneumonia  Patient was found to have COVID-19 and started on moderate pathway  Due to rib fractures and acute respiratory failure, acute encephalopathy patient was admitted stepdown 1  Goals of care were discussed, family decided to pursue level 3 DNR/DNI and did not want hospice services  Patient's acute respiratory failure slowly improved  On admission, urinary evaluation was completed and no infection was determined  However, throughout admission she developed a urinary tract infection and IV medications were initiated  She also developed hypernatremia and nephrology consult was completed  She was maintained on IV D5W and sodium improved  Patient weaned off of oxygen, worked with physical therapy and was recommended to go to rehab    And discharge when medically stable    Complications: Acute respiratory failure, UTI    Condition at Discharge: stable         Discharge instructions/Information to patient and family:   See after visit summary for information provided to patient and family  Provisions for Follow-Up Care:  See after visit summary for information related to follow-up care and any pertinent home health orders  PCP: Kang David MD    Disposition: Short-term rehab at [facility]    Planned Readmission: No    Discharge Statement   I spent 30 minutes discharging the patient  This time was spent on the day of discharge  I had direct contact with the patient on the day of discharge  Additional documentation is required if more than 30 minutes were spent on discharge  Discharge Medications:  See after visit summary for reconciled discharge medications provided to patient and family

## 2022-12-15 NOTE — ASSESSMENT & PLAN NOTE
-She does not take any home medications  Was found to be hypertensive while inpatient  - Will increase amlodipine to 5 mg from 2 5   -Continue to monitor blood pressure    - Follow up with PCP

## 2022-12-15 NOTE — ASSESSMENT & PLAN NOTE
- COVID positive on 12/2/2022  - Patient completed 10-day quarantine   -Largely asymptomatic  Occasionally requiring 2 L-but normally on room air  Occasional dry cough    -Continue with pulmonary toileting though difficult due to dementia  - CXR 12/5 Showed small left effusion, No PTX, No focal consolidation   - Complete Decadron 10 day trial  - Remdesivir completed  - Baricitinib 4 mg daily discontinued

## 2022-12-15 NOTE — CASE MANAGEMENT
Case Management Discharge Planning Note    Patient name Moises Higginbotham  Location [facility] 604/[facility] 824-67 MRN [id number]  : 3/7/1926 Date 12/15/2022       Current Admission Date: 2022  Current Admission Diagnosis:Fracture of multiple ribs of left side   Patient Active Problem List    Diagnosis Date Noted   • Dysphagia 2022   • Ambulatory dysfunction 2022   • Dementia ([address] ) 2022   • COVID 2022   • Fracture of multiple ribs of left side 2022   • Hypertension 2017   • Snores 05/15/2017   • Taste absent 2016   • Vitamin D deficiency 2015   • Benign colon polyp 2012   • Osteopenia 2012      LOS (days): 14  Geometric Mean LOS (GMLOS) (days): 4 30  Days to GMLOS:-9 5     OBJECTIVE:  Risk of Unplanned Readmission Score: 17 63         Current admission status: Inpatient   Preferred Pharmacy:    [facility] - [address]  Phone: [phone] Fax: [phone]    Primary Care Provider: Caleb Cottrell MD    Primary Insurance: MEDICARE  Secondary Insurance: [facility]    DISCHARGE DETAILS:    [address]         Is the patient interested in [place] [address] at discharge?: No    DME Referral Provided  Referral made for DME?: No    Treatment Team Recommendation: SNF  Discharge Destination Plan[de-identified] SNF  IMM Given (Date):: 12/15/22  IMM Given to[de-identified] Family  Family notified[de-identified] nephew    Pt accepted by [facility], who is the pt's family's first choice due to proximity  They can bring the pt in today  CM submitted for BLS transport any time after 1130   Pt's nephew and nurse made aware

## 2022-12-15 NOTE — PROGRESS NOTES
-- Patient:  -- MRN: [id number]  -- Aidin Request ID: [id number]  -- Level of care reserved: [facility]  -- Partner Reserved: [facility], [facility][note 3] [address] [facility] [place] [phone]  -- Clinical needs requested:  -- Geography searched: 10 miles around [facility]  -- Start of Service:  -- Request sent: 3:43pm EST on 12/8/2022 by Namrata Khanna  -- Partner reserved: 9:55am EST on 12/15/2022 by Namrata Khanna  -- Choice list shared:

## 2022-12-15 NOTE — INCIDENTAL FINDINGS
The following findings require follow up:  Radiographic finding   Findin  Soft tissue lesion within the right kidney measuring 2 5 cm, highly concerning for renal carcinoma  Urology evaluation is recommended      2  Groundglass changes within the superior segment of the right lower lobe measuring 2 6 x 1 1 cm, suspicious for developing pneumonia  Follow-up chest CT is recommended in 2-3 months to monitor for resolution given the additional findings detailed in   impression #2  There are multiple bilateral pulmonary nodules measuring up to 9 mm which appear stable from prior CT of 2017      3   Subcentimeter hypodensity within the spleen is too small to accurately characterize and is of indeterminate clinical sulcus  This can be evaluated with a nonemergent ultrasound as clinically warranted  Follow up required: Yes - As soon as possible  Follow up should be done within 2 week(s)      Discussed findings with patient's nephew, Marcelo Bob, over the phone  He reports that he wrote down the findings and and recommendations  He was appreciative of the call and all of his questions and concerns were answered to his satisfaction  He reports that due to patient's age, he plans to follow up with PCP and decide if they want to pursue further evaluation of these findings  Please notify the following clinician to assist with the follow up:   Jesús Tuttle MD Phone   [phone]

## 2022-12-15 NOTE — ASSESSMENT & PLAN NOTE
- Geriatrics consult - recs appreciated  - GCS 14 (U3Z4O2) disoriented to time and place  - Restraints secondary to impulsivity--trialing off restraints, though patient has ripped out previous IVs   - Re-directable, follows commands  - no behavioral disturbances currently  - decrease in function since admission, cannot return to living at home per Geriatrics  -Family not looking to pursue hospice at this time    Patient remains DNR/DNI

## 2022-12-16 ENCOUNTER — NURSING HOME VISIT (OUTPATIENT)
Dept: GERIATRICS | Facility: OTHER | Age: 87
End: 2022-12-16

## 2022-12-16 ENCOUNTER — TELEPHONE (OUTPATIENT)
Dept: HEMATOLOGY ONCOLOGY | Facility: CLINIC | Age: 87
End: 2022-12-16

## 2022-12-16 DIAGNOSIS — F03.C11 SEVERE DEMENTIA WITH AGITATION, UNSPECIFIED DEMENTIA TYPE: ICD-10-CM

## 2022-12-16 DIAGNOSIS — N28.9 KIDNEY LESION, NATIVE, RIGHT: ICD-10-CM

## 2022-12-16 DIAGNOSIS — I10 HYPERTENSION, UNSPECIFIED TYPE: ICD-10-CM

## 2022-12-16 DIAGNOSIS — Z97.8 FOLEY CATHETER IN PLACE ON ADMISSION: ICD-10-CM

## 2022-12-16 DIAGNOSIS — N18.31 STAGE 3A CHRONIC KIDNEY DISEASE (HCC): ICD-10-CM

## 2022-12-16 DIAGNOSIS — Z66 DNR (DO NOT RESUSCITATE): ICD-10-CM

## 2022-12-16 DIAGNOSIS — Z86.16 HISTORY OF 2019 NOVEL CORONAVIRUS DISEASE (COVID-19): ICD-10-CM

## 2022-12-16 DIAGNOSIS — N13.9 OBSTRUCTIVE UROPATHY: ICD-10-CM

## 2022-12-16 DIAGNOSIS — H40.9 GLAUCOMA, UNSPECIFIED GLAUCOMA TYPE, UNSPECIFIED LATERALITY: ICD-10-CM

## 2022-12-16 DIAGNOSIS — R13.12 OROPHARYNGEAL DYSPHAGIA: ICD-10-CM

## 2022-12-16 DIAGNOSIS — S22.42XD CLOSED FRACTURE OF MULTIPLE RIBS OF LEFT SIDE WITH ROUTINE HEALING, SUBSEQUENT ENCOUNTER: Primary | ICD-10-CM

## 2022-12-16 DIAGNOSIS — R54 FRAILTY SYNDROME IN GERIATRIC PATIENT: ICD-10-CM

## 2022-12-16 DIAGNOSIS — E87.0 HYPERNATREMIA: ICD-10-CM

## 2022-12-16 NOTE — PROGRESS NOTES
[address]  [address]  Facility: [address] [facility] and [address] [address]    HISTORY AND PHYSICAL    NAME: Eliud Park  AGE: 80 y o  SEX: female    DATE OF ENCOUNTER: 12/16/2022    Code status:  DNR    Assessment and Plan     1  Closed fracture of multiple ribs of left side with routine healing, subsequent encounter  Assessment & Plan:  · Sustained after fall from standing height with head strike on December 1, 2022  · Admitted to the acute care hospital from December 1, 2020 through 2 December 15, 2022  Secondary to her decreased level of functioning from baseline, she will be admitted to the subacute rehabilitation facility and seen in consultation by a multidisciplinary rehabilitation team for evaluation and treatment to assist her in returning to her prior level of functioning  We will continue with incentive spirometry and care by the facility respiratory service  We will continue with multimodal pain management  We will continue with monitoring for change in her condition      2  Hypertension, unspecified type  Assessment & Plan:  · We will continue her prior to admission amlodipine  · We will continue with monitoring for change in her condition  · She will follow-up with her PCP upon discharge      3  Stage 3a chronic kidney disease ([address] )  Assessment & Plan:  · Baseline creatinine reported as 0 9-1 1  · December 15, 2022: Creatinine 0 66, EGFR 74  · She will be admitted to the subacute rehabilitation facility under the acute kidney injury pathway  · We will continue with clinical and periodic laboratory monitoring for change in her condition  · She will follow-up with her PCP upon discharge      4  Obstructive uropathy  Assessment & Plan:  · Currently with James catheter in place at time of admission  · Will pursue voiding trial soon as possible  · We will continue with monitoring for change in her condition      5   James catheter in place on admission  Assessment & Plan:  · Secondary to obstructive uropathy discovered during her hospitalization  · Nursing staff to continue with local care and maintenance of her James catheter while in place  · We will pursue a voiding trial in the near future  · We will continue with monitoring for change in her condition      6  Oropharyngeal dysphagia  Assessment & Plan:  · December 13, 2022: Modified video barium swallow study-moderate oropharyngeal dysphagia and mild to moderate pharyngeal dysphagia  · She will be seen in consultation by the therapy service during her subacute rehabilitation facility  · We will continue with modified diet and nectar thick liquids  · Will continue with monitoring for change in her condition      7  Severe dementia with agitation, unspecified dementia type  Assessment & Plan:  · Requiring addition of olanzapine and as needed soft wrist restraints during her hospitalization secondary to agitation and poor safety awareness  · Will continue with her prior to admission olanzapine at bedtime  · Will pursue voiding trial as soon as possible  · Will provide a safe, secure, structured, and supportive environment at the subacute rehabilitation facility  · We will continue to monitor for change in her condition  · She will follow-up with her PCP upon discharge      8  History of 2019 novel coronavirus disease (COVID-19)  Assessment & Plan:  · Tested positive on December 2, 2022 during hospitalization-required treatment on the moderate pathway, including baricitinib  · Has completed 10-day quarantine  · Will continue with monitoring and supportive care      9  Hypernatremia  Assessment & Plan:  · Seen by nephrology service during her hospitalization  · Felt secondary to poor oral fluid intake  · Resolved with IV fluid resuscitation  · We will continue with clinical and periodic laboratory monitoring for change in her condition      10   Frailty syndrome in geriatric patient  Assessment & Plan:  · Secondary to her acute on chronic medical conditions  · She requires 24/7 care/support of her ADLs  · We will continue with 24/7 care/support of her ADLs while at the subacute rehabilitation facility  · We will continue to monitor for change in her condition      11  Glaucoma, unspecified glaucoma type, unspecified laterality  Assessment & Plan:  · We will continue with her prior to admission eye vitamins and eyedrops  · She will follow-up with her PCP and ophthalmology services upon discharge      12  Kidney lesion, native, right  Assessment & Plan:  · Record review shows that primary service reviewed with the patient's nephew and he will discuss further with the patient's PCP upon discharge      13  DNR (do not resuscitate)    See recent hospital discharge summary note for further information  All medications and routine orders were reviewed and updated as needed  Plan discussed with: Nursing staff  CC: PCP    Chief Complaint     She is seen for a visit to perform a history and physical exam to be admitted to the subacute rehabilitation facility  History of Present Illness     She is a 20-year-old woman with the comorbidities of hypertension, glaucoma, stage III chronic kidney disease, and dementia who is seen with female nursing staff for a visit to perform a history and physical exam to be admitted to the subacute rehabilitation stay after acute care hospitalization for left-sided rib fractures after a fall from standing height with head strike, urinary tract infection,, hypernatremia, and acute COVID-19 infection  She presented to the emergency room on December 1, 2022 after experiencing a fall from standing height with head strike  Evaluation by the trauma service revealed left-sided rib fractures from the third through the seventh ribs and scalp hematoma/abrasion    She was admitted under the trauma service to the [facility] hospital from December 1, 2020 through December 15, 2022  She was seen in consultation by the nephrology, geriatric, and therapy services during her hospitalization  She was tested for COVID-19 secondary to recent exposure reported by her family  Her COVID-19 testing returned positive on December 2, 2022  She was treated under the moderate pathway and received baricitinib for 14 days  Nephrology service was consulted secondary to hypernatremia and other electrolyte abnormalities  Hypernatremia felt secondary to decreased free fluid intake  Obstructive uropathy was found requiring the placement of a James catheter  Video barium swallow study performed during hospitalization revealed moderate oral dysphagia and mild to moderate pharyngeal dysphagia  Dysphagia diet with nectar thick liquids recommended  She received a course of antibiotics for a urinary tract infection during her hospitalization  Secondary to agitation and poor safety awareness she was treated with olanzapine and as needed soft restraints during her hospitalization  Therapy service recommended a postacute care rehabilitation stay  She was transferred to the subacute rehabilitation facility on December 15, 2022  She reports feeling fine and denies having pain  She is oriented to herself, only      HISTORY:  Past Medical History:   Diagnosis Date   • Anosmia    • Cellulitis    • Diverticulitis, colon    • Fatigue 5/15/2017   • Generalized osteoarthritis of multiple sites    • Hyperlipidemia    • Hypertension    • Impacted cerumen of both ears    • Impaired fasting glucose 7/4/2012   • Lyme disease    • Macular degeneration    • Tremor      Past Surgical History:   Procedure Laterality Date   • BREAST BIOPSY     • CATARACT EXTRACTION     • HYSTERECTOMY       Family History   Problem Relation Age of Onset   • Lung cancer Father      Social History     Socioeconomic History   • Marital status: /Civil Union     Spouse name: Not on file   • Number of children: Not on file • Years of education: Not on file   • Highest education level: Not on file   Occupational History   • Not on file   Tobacco Use   • Smoking status: Never   • Smokeless tobacco: Never   Vaping Use   • Vaping Use: Never used   Substance and Sexual Activity   • Alcohol use: Yes     Alcohol/week: 5 0 standard drinks     Types: 5 Glasses of wine per week     Comment: social   • Drug use: No   • Sexual activity: Not on file   Other Topics Concern   • Not on file   Social History Narrative   • Not on file     Social Determinants of Health     Financial Resource Strain: Not on file   Food Insecurity: No Food Insecurity   • Worried About Running Out of Food in the Last Year: Never true   • Ran Out of Food in the Last Year: Never true   Transportation Needs: No Transportation Needs   • Lack of Transportation (Medical): No   • Lack of Transportation (Non-Medical): No   Physical Activity: Not on file   Stress: Not on file   Social Connections: Not on file   Intimate Partner Violence: Not on file   Housing Stability: Low Risk    • Unable to Pay for Housing in the Last Year: No   • Number of Places Lived in the Last Year: 1   • Unstable Housing in the Last Year: No       Allergies:  No Known Allergies    Review of Systems     Review of Systems   Unable to perform ROS: Dementia       Medications and orders     All medications reviewed and updated in Nursing Home EMR  Objective     Vitals: Pulse 76, blood pressure 130/74, pulse oximetry 95% on room air, temperature 97 8 °F     Physical Exam  Vitals reviewed  Exam conducted with a chaperone present  Constitutional:       General: She is awake  Appearance: She is well-developed  She is not toxic-appearing or diaphoretic  Comments: She appears comfortable sitting in a wheelchair, her stated age, and very frail  HENT:      Nose: Nose normal       Mouth/Throat:      Mouth: Mucous membranes are moist    Eyes:      General: No scleral icterus       Conjunctiva/sclera: Conjunctivae normal    Cardiovascular:      Rate and Rhythm: Normal rate and regular rhythm  Heart sounds: Normal heart sounds  No murmur heard  No friction rub  No gallop  Comments: There is no pedal edema, bilaterally  Pulmonary:      Effort: Pulmonary effort is normal  No respiratory distress  Breath sounds: Normal breath sounds  No stridor  No wheezing, rhonchi or rales  Musculoskeletal:      Cervical back: Neck supple  Neurological:      Mental Status: She is alert  She is confused  Psychiatric:         Mood and Affect: Mood normal          Speech: She is noncommunicative  Behavior: Behavior is cooperative  Pertinent Laboratory/Diagnostic Studies: The following labs/studies were reviewed please see chart or hospital paperwork for details  Lab Results   Component Value Date    WBC 11 64 (H) 12/15/2022    HGB 12 8 12/15/2022    HCT 39 2 12/15/2022     (H) 12/15/2022     (H) 12/15/2022     Lab Results   Component Value Date    SODIUM 144 12/15/2022    K 3 9 12/15/2022     (H) 12/15/2022    CO2 25 12/15/2022    BUN 22 12/15/2022    CREATININE 0 66 12/15/2022    GLUC 85 12/15/2022    CALCIUM 8 3 12/15/2022     December 1, 2022:    CT of chest, abdomen, pelvis with contrast:    IMPRESSION:  1   Multiple mildly displaced/nondisplaced fractures involving the anterolateral aspects of ribs 3 through 7 on the left  No CT evidence of acute traumatic sequelae within elsewhere within the chest, abdomen, or pelvis      2  Soft tissue lesion within the right kidney measuring 2 5 cm, highly concerning for renal carcinoma  Urology evaluation is recommended      3   Groundglass changes within the superior segment of the right lower lobe measuring 2 6 x 1 1 cm, suspicious for developing pneumonia  Follow-up chest CT is recommended in 2-3 months to monitor for resolution given the additional findings detailed in   impression #2    There are multiple bilateral pulmonary nodules measuring up to 9 mm which appear stable from prior CT of 2017      4   Colonic diverticulosis without evidence of acute diverticulitis      5  The rectum is moderately distended with fecal material measuring up to 6 5 cm in greatest diameter  No evidence of rectal wall thickening or perirectal inflammatory changes      6   Subcentimeter hypodensity within the spleen is too small to accurately characterize and is of indeterminate clinical sulcus  This can be evaluated with a nonemergent ultrasound as clinically warranted  December 13, 2022: Modified (Video) Barium Swallow Study     Summary:  Images are on PACS for review       Oral stage: Moderate impairment maldonado by poor manipulation of solids, poor bolus control & transfers w/ mild BOT residue  Pt is unable to follow commands to use a 2* swallow  Pharyngeal stage:  Mild/mod impairment maldonado by mod  swallow delay, reduced pharyngeal constriction w/ mild pooling at times in the valleculae & PS   +silent aspiration of thin during and after swallows  Unable to clear the material w/ any cues  Per gross esophageal screen:  Clear, wfl   Strategies:  Pt unable to follow any commands        Recommendations:  Diet: puree  Liquids: nectar  Meds: crush as needed   Strategies: alternate the bites w/ sips   Frequent oral care  Upright position  F/u ST tx: as able   Therapy Prognosis:guarded   Prognosis considerations: pt w/ h/o severe dementia  Full Supervision  Aspiration Precautions  Reflux Precautions  Consider consult with: DARIO  Results reviewed with: pt, nursing,physician  Aspiration precautions posted  Repeat VBS as necessary  Goals:  Pt will tolerate least restrictive diet w/out s/s aspiration or oral/pharyngeal difficulties    H&P/pertinent provider notes: (PMH noted above)  Refer to bedside for full infor    December 1, 2022:    Status: Final result     Study Result    Narrative & Impression    Age and gender specific ECG analysis Sinus tachycardia  Baseline artifact  Nonspecific ST and T wave abnormality  Abnormal ECG     - Her order summary was reviewed and signed  Portions of the record may have been created with voice recognition software  Occasional wrong word or "sound a like" substitutions may have occurred due to the inherent limitations of voice recognition software  Read the chart carefully and recognize, using context, where substitutions have occurred      COREY Spencer   12/19/2022 9:18 AM

## 2022-12-16 NOTE — TELEPHONE ENCOUNTER
Made attempt to schedule a new patient appointment with Surgical oncology, I spoke with the patients spouse who advise that the patient is still inpatient

## 2022-12-19 PROBLEM — H40.9 GLAUCOMA: Status: ACTIVE | Noted: 2022-12-19

## 2022-12-19 PROBLEM — Z66 DNR (DO NOT RESUSCITATE): Status: ACTIVE | Noted: 2022-12-19

## 2022-12-19 PROBLEM — N28.9 KIDNEY LESION, NATIVE, RIGHT: Status: ACTIVE | Noted: 2022-12-19

## 2022-12-19 PROBLEM — R13.12 OROPHARYNGEAL DYSPHAGIA: Status: ACTIVE | Noted: 2022-12-08

## 2022-12-19 PROBLEM — N13.9 OBSTRUCTIVE UROPATHY: Status: ACTIVE | Noted: 2022-12-19

## 2022-12-19 PROBLEM — R54 FRAILTY SYNDROME IN GERIATRIC PATIENT: Status: ACTIVE | Noted: 2022-12-19

## 2022-12-19 PROBLEM — N18.31 STAGE 3A CHRONIC KIDNEY DISEASE (HCC): Status: ACTIVE | Noted: 2022-12-19

## 2022-12-19 PROBLEM — Z97.8 FOLEY CATHETER IN PLACE ON ADMISSION: Status: ACTIVE | Noted: 2022-12-19

## 2022-12-19 PROBLEM — Z86.16 HISTORY OF 2019 NOVEL CORONAVIRUS DISEASE (COVID-19): Status: ACTIVE | Noted: 2022-12-03

## 2022-12-19 RX ORDER — OLANZAPINE 5 MG/1
5 TABLET ORAL
COMMUNITY

## 2022-12-19 NOTE — ASSESSMENT & PLAN NOTE
· Secondary to her acute on chronic medical conditions  · She requires 24/7 care/support of her ADLs  · We will continue with 24/7 care/support of her ADLs while at the subacute rehabilitation facility  · We will continue to monitor for change in her condition

## 2022-12-19 NOTE — ASSESSMENT & PLAN NOTE
· Tested positive on December 2, 2022 during hospitalization-required treatment on the moderate pathway, including baricitinib  · Has completed 10-day quarantine  · Will continue with monitoring and supportive care

## 2022-12-19 NOTE — ASSESSMENT & PLAN NOTE
· Secondary to obstructive uropathy discovered during her hospitalization  · Nursing staff to continue with local care and maintenance of her James catheter while in place  · We will pursue a voiding trial in the near future  · We will continue with monitoring for change in her condition

## 2022-12-19 NOTE — ASSESSMENT & PLAN NOTE
· December 13, 2022:  Modified video barium swallow study-moderate oropharyngeal dysphagia and mild to moderate pharyngeal dysphagia  · She will be seen in consultation by the therapy service during her subacute rehabilitation facility  · We will continue with modified diet and nectar thick liquids  · Will continue with monitoring for change in her condition

## 2022-12-19 NOTE — ASSESSMENT & PLAN NOTE
· Record review shows that primary service reviewed with the patient's nephew and he will discuss further with the patient's PCP upon discharge

## 2022-12-19 NOTE — ASSESSMENT & PLAN NOTE
· We will continue her prior to admission amlodipine  · We will continue with monitoring for change in her condition  · She will follow-up with her PCP upon discharge

## 2022-12-19 NOTE — ASSESSMENT & PLAN NOTE
· We will continue with her prior to admission eye vitamins and eyedrops  · She will follow-up with her PCP and ophthalmology services upon discharge

## 2022-12-19 NOTE — ASSESSMENT & PLAN NOTE
· Sustained after fall from standing height with head strike on December 1, 2022  · Admitted to the acute care hospital from December 1, 2020 through 2 December 15, 2022  Secondary to her decreased level of functioning from baseline, she will be admitted to the subacute rehabilitation facility and seen in consultation by a multidisciplinary rehabilitation team for evaluation and treatment to assist her in returning to her prior level of functioning  We will continue with incentive spirometry and care by the facility respiratory service  We will continue with multimodal pain management  We will continue with monitoring for change in her condition

## 2022-12-19 NOTE — ASSESSMENT & PLAN NOTE
· Seen by nephrology service during her hospitalization  · Felt secondary to poor oral fluid intake  · Resolved with IV fluid resuscitation  · We will continue with clinical and periodic laboratory monitoring for change in her condition

## 2022-12-19 NOTE — ASSESSMENT & PLAN NOTE
· Currently with James catheter in place at time of admission  · Will pursue voiding trial soon as possible  · We will continue with monitoring for change in her condition

## 2022-12-19 NOTE — ASSESSMENT & PLAN NOTE
· Requiring addition of olanzapine and as needed soft wrist restraints during her hospitalization secondary to agitation and poor safety awareness  · Will continue with her prior to admission olanzapine at bedtime  · Will pursue voiding trial as soon as possible  · Will provide a safe, secure, structured, and supportive environment at the subacute rehabilitation facility  · We will continue to monitor for change in her condition  · She will follow-up with her PCP upon discharge

## 2022-12-19 NOTE — ASSESSMENT & PLAN NOTE
· Baseline creatinine reported as 0 9-1 1  · December 15, 2022: Creatinine 0 66, EGFR 74  · She will be admitted to the subacute rehabilitation facility under the acute kidney injury pathway  · We will continue with clinical and periodic laboratory monitoring for change in her condition  · She will follow-up with her PCP upon discharge

## 2022-12-20 ENCOUNTER — TELEPHONE (OUTPATIENT)
Dept: HEMATOLOGY ONCOLOGY | Facility: CLINIC | Age: 87
End: 2022-12-20

## 2022-12-20 NOTE — TELEPHONE ENCOUNTER
After Visit Summary   7/24/2018    Zeeshan Feng    MRN: [id number]           Patient Information     Date Of Birth          1981        Visit Information        Provider Department      7/24/2018 4:45 PM Lisbet Haq PA-C [facility]        Today's Diagnoses     On isotretinoin therapy    -  1       Follow-ups after your visit        Your next 10 appointments already scheduled     Jul 24, 2018  5:15 PM CDT   LAB with  LAB   [facility] ([facility])    [address] [place] [id number] [phone]           Please do not eat 10-12 hours before your appointment if you are coming in fasting for labs on lipids, cholesterol, or glucose (sugar). This does not apply to pregnant women. Water, hot tea and black coffee (with nothing added) are okay. Do not drink other fluids, diet soda or chew gum.            Aug 24, 2018  2:45 PM CDT   (Arrive by 2:30 PM)   Return Visit with SILVIA Diaz [facility] ([facility])    [address] [place] [id number] [phone]              Who to contact     Please call your clinic at [phone] to:    Ask questions about your health    Make or cancel appointments    Discuss your medicines    Learn about your test results    Speak to your doctor            Additional Information About Your Visit        MyChart Information     [employer] gives you secure access to your electronic health record. If you see a primary care provider, you can also send messages to your care team and make appointments. If you have questions, please call your primary care clinic.  If you do not have a primary care provider, please call [phone] and they will assist you.      [employer] is an electronic gateway that provides easy, online access to your medical records. With [employer], you can request a clinic appointment, read your test results, renew a  Made attempt to schedule a new patient consultation  Patient’s mailbox is not set up, I was unable to leave a voicemail  prescription or communicate with your care team.     To access your existing account, please contact your [facility] or call [phone] for assistance.        Care EveryWhere ID     This is your Care EveryWhere ID. This could be used by other organizations to access your [facility] medical records  [id number]         Blood Pressure from Last 3 Encounters:   No data found for BP    Weight from Last 3 Encounters:   No data found for Wt              We Performed the Following     HCG Qual, Urine - [facility],  [place], [place]  ([id number])        Primary Care Provider Fax #    Physician No Ref-Primary [phone]       No address on file        Equal Access to Services     [facility]: Hadii aad ku hadasho Soomaali, waaxda luqadaha, qaybta kaalmada adeegyada, julian valentino . So [facility] [phone].    ATENCIÓN: Si habla español, tiene a beltre disposición servicios gratuitos de asistencia lingüística. [facility] [phone].    We comply with applicable federal civil rights laws and [place] laws. We do not discriminate on the basis of race, color, national origin, age, disability, sex, sexual orientation, or gender identity.            Thank you!     Thank you for choosing [facility]  for your care. Our goal is always to provide you with excellent care. Hearing back from our patients is one way we can continue to improve our services. Please take a few minutes to complete the written survey that you may receive in the mail after your visit with us. Thank you!             Your Updated Medication List - Protect others around you: Learn how to safely use, store and throw away your medicines at www.disposemymeds.org.          This list is accurate as of 7/24/18  5:02 PM.  Always use your most recent med list.                   Brand Name Dispense Instructions for use Diagnosis    clindamycin 1 % lotion    CLEOCIN T    60 mL    Apply topically 2 times daily    Acne  vulgaris       hydroquinone 4 % Crea     30 g    Apply topically QD or BID, for no more than 8 consecutive weeks    Post-inflammatory hyperpigmentation       tretinoin 0.025 % cream    RETIN-A    45 g    Use every night    Acne vulgaris

## 2022-12-23 ENCOUNTER — NURSING HOME VISIT (OUTPATIENT)
Dept: GERIATRICS | Facility: OTHER | Age: 87
End: 2022-12-23

## 2022-12-23 DIAGNOSIS — R55 SYNCOPE, UNSPECIFIED SYNCOPE TYPE: Primary | ICD-10-CM

## 2022-12-26 VITALS
TEMPERATURE: 98 F | SYSTOLIC BLOOD PRESSURE: 122 MMHG | BODY MASS INDEX: 24.35 KG/M2 | DIASTOLIC BLOOD PRESSURE: 58 MMHG | RESPIRATION RATE: 18 BRPM | HEART RATE: 66 BPM | WEIGHT: 116.5 LBS

## 2022-12-26 PROBLEM — R55 SYNCOPE: Status: ACTIVE | Noted: 2022-12-26

## 2022-12-26 NOTE — ASSESSMENT & PLAN NOTE
Reported syncopal episode after she was in the bathroom last night  Labs ordered for this AM reviewed- normal  Staff encouraging fluids  VSS  No CP/SOB  Will continue to monitor closely

## 2022-12-26 NOTE — PROGRESS NOTES
[place] [phone] [address]  Code 31      NAME: Teresa Murillo  AGE: 80 y o  SEX: female [id number]    DATE OF ENCOUNTER: 12/23/22    CODE STATUS: DNR    Assessment and Plan     Problem List Items Addressed This Visit        Other    Syncope - Primary     Reported syncopal episode after she was in the bathroom last night  Labs ordered for this AM reviewed- normal  Staff encouraging fluids  VSS  No CP/SOB  Will continue to monitor closely            No orders of the defined types were placed in this encounter  Chief Complaint     Chief Complaint   Patient presents with   • Geriatric Evaluation     syncope       History of Present Illness   80year old female being seen today for follow up after a syncopal episode after she was in the bathroom last night  Denies CP/SOB  She was admitted to [facility] for short term rehab  She states she feels at baseline today   VS have been stable       The following portions of the patient's history were reviewed and updated as appropriate: allergies, current medications, past family history, past medical history, past social history, past surgical history and problem list     Review of Systems   Review of Systems   Unable to perform ROS: Age          Active Problem List     Patient Active Problem List   Diagnosis   • Hypertension   • Benign colon polyp   • Osteopenia   • Snores   • Taste absent   • Vitamin D deficiency   • Fracture of multiple ribs of left side   • Ambulatory dysfunction   • Dementia ([address] )   • History of 2019 novel coronavirus disease (COVID-19)   • Hypernatremia   • Oropharyngeal dysphagia   • Frailty syndrome in geriatric patient   • DNR (do not resuscitate)   • Obstructive uropathy   • James catheter in place on admission   • Glaucoma   • Kidney lesion, native, right   • Stage 3a chronic kidney disease ([address] )   • Syncope         Objective     /58   Pulse 66 Temp 98 °F (36 7 °C)   Resp 18   Wt 52 8 kg (116 lb 8 oz)   BMI 24 35 kg/m²     Physical Exam  Vitals reviewed  Constitutional:       General: She is not in acute distress  Appearance: She is not diaphoretic  Comments: Frail appearing   HENT:      Nose: Nose normal  No congestion  Mouth/Throat:      Mouth: Mucous membranes are moist       Pharynx: Oropharynx is clear  Cardiovascular:      Rate and Rhythm: Normal rate  Pulmonary:      Effort: Pulmonary effort is normal  No respiratory distress  Breath sounds: Normal breath sounds  Abdominal:      General: Bowel sounds are normal       Palpations: Abdomen is soft  Musculoskeletal:         General: No deformity or signs of injury  Cervical back: Neck supple  Skin:     General: Skin is warm and dry  Findings: No erythema  Neurological:      Mental Status: She is alert  Mental status is at baseline  Pertinent Laboratory/Diagnostic Studies:    hgb 11 1  Wbc 9 2  Creat 0 74    K 4 0  Mag 2 1  Phos 3 1    Current Medications   Medications reviewed and updated in facility chart

## 2022-12-28 ENCOUNTER — NURSING HOME VISIT (OUTPATIENT)
Dept: GERIATRICS | Facility: OTHER | Age: 87
End: 2022-12-28

## 2022-12-28 DIAGNOSIS — N13.9 OBSTRUCTIVE UROPATHY: Primary | ICD-10-CM

## 2022-12-28 DIAGNOSIS — M79.662 TENDERNESS OF LEFT CALF: ICD-10-CM

## 2022-12-28 DIAGNOSIS — R55 SYNCOPE, UNSPECIFIED SYNCOPE TYPE: ICD-10-CM

## 2022-12-28 DIAGNOSIS — M25.472 PAIN AND SWELLING OF LEFT ANKLE: ICD-10-CM

## 2022-12-28 DIAGNOSIS — M25.572 PAIN AND SWELLING OF LEFT ANKLE: ICD-10-CM

## 2022-12-28 NOTE — ASSESSMENT & PLAN NOTE
· Nursing notes patient had syncopal episode in bathroom this morning  · This is second episode in 1 week  · Vital signs all with in normal range  · Patient seen today and reports feeling "okay, but tired"  · Continue to monitor patient for acute changes in condition

## 2022-12-28 NOTE — ASSESSMENT & PLAN NOTE
· Araujo cath d/c 12/19/22  · Bladder scan performed this morning to r/o urinary retention r/t vasovagal incident this AM without BM  · Bladder scan showed patient with over 400 cc of urine and patient had dry brief per nursing staff    · 18 fr 10 cc araujo cath reinserted today  · UA C&S ordered due to dark/foul smelling urine  · Continue to monitor urinary output  · Continue araujo cath care  · Future voiding trial will be discussed with f/u Urology  · Continue to monitor patient for acute changes in condition

## 2022-12-28 NOTE — ASSESSMENT & PLAN NOTE
· Patient with swelling in LLE with bruising in left foot  · Patient with left calf tenderness upon palpation  · Will order LLE venous doppler to r/o DVT  · Continue to monitor patient for changes in condition

## 2022-12-28 NOTE — ASSESSMENT & PLAN NOTE
· Noted bruising and swelling of left ankle with pain upon palpation  · Patient with recent fall at home, but no known recent falls  · Will order X-ray to r/o fracture

## 2022-12-28 NOTE — PROGRESS NOTES
[address]  [phone]  [facility]  POS 31  PROGRESS NOTE        NAME: Lucas Humphrey  AGE: 80 y o  SEX: female  :  3/7/1926  DATE OF ENCOUNTER: 2022    Chief Complaint   Patient seen and examined for follow up on chronic conditions  History of Present Illness     Lorene Granger is a patient of [facility] with acute and chronic medical conditions of oropharyngeal dysphagia, hypertension, dementia, Fx of multiple ribs of left side, osteopenia, CKD stage 3, hypernatremia, kidney lesion of right, multiple syncopal episodes, and ambulatory dysfunction  The patient is being seen and examined today for follow-up on acute and chronic medical conditions  Upon examination, the patient is laying in her bed, alert, cooperative, and in no acute distress  Due to patient's cognitive impairment, I am unable to obtain ROS  PT/OT have been providing therapy treatment for restorative services  The following portions of the patient's history were reviewed and updated as appropriate: allergies, current medications, past family history, past medical history, past social history, past surgical history and problem list     Review of Systems     A 12-point comprehensive review of symptoms was obtained with pertinent positives and negatives noted per HPI      History     Past Medical History:   Diagnosis Date   • Anosmia    • Cellulitis    • Diverticulitis, colon    • Fatigue 5/15/2017   • Generalized osteoarthritis of multiple sites    • Hyperlipidemia    • Hypertension    • Impacted cerumen of both ears    • Impaired fasting glucose 2012   • Lyme disease    • Macular degeneration    • Pain and swelling of left ankle 2022   • Tenderness of left calf 2022   • Tremor      Past Surgical History:   Procedure Laterality Date   • BREAST BIOPSY     • CATARACT EXTRACTION     • HYSTERECTOMY       Family History   Problem Relation Age of Onset   • Lung cancer Father      Social History     Socioeconomic History   • Marital status: /Civil Union     Spouse name: Not on file   • Number of children: Not on file   • Years of education: Not on file   • Highest education level: Not on file   Occupational History   • Not on file   Tobacco Use   • Smoking status: Never   • Smokeless tobacco: Never   Vaping Use   • Vaping Use: Never used   Substance and Sexual Activity   • Alcohol use: Yes     Alcohol/week: 5 0 standard drinks     Types: 5 Glasses of wine per week     Comment: social   • Drug use: No   • Sexual activity: Not on file   Other Topics Concern   • Not on file   Social History Narrative   • Not on file     Social Determinants of Health     Financial Resource Strain: Not on file   Food Insecurity: No Food Insecurity   • Worried About Running Out of Food in the Last Year: Never true   • Ran Out of Food in the Last Year: Never true   Transportation Needs: No Transportation Needs   • Lack of Transportation (Medical): No   • Lack of Transportation (Non-Medical):  No   Physical Activity: Not on file   Stress: Not on file   Social Connections: Not on file   Intimate Partner Violence: Not on file   Housing Stability: Low Risk    • Unable to Pay for Housing in the Last Year: No   • Number of Places Lived in the Last Year: 1   • Unstable Housing in the Last Year: No     No Known Allergies    Objective     Vital Signs  BP:139/63     HR:95 T:98 2    RR:18 O2Sat:95% W:105 3  General: NAD, Thin, Frail  Oral: Oropharynx Dry and Clear  Neck: Supple, +ROM  CV: S1, S2, normal rate, irregular rhythm, no murmur appreciated  Pulmonary: Lung sounds clear to air, no wheezing, rhonchi, rales, diminished left fields  Abdominal:BS + x4 in all quadrants, soft, no mass, pelvic tenderness  Extremities: RLE +1, LLE +2 pitting edema, +ROM, +weakness, left calf tenderness  Skin: Warm, Dry, no lesions, no rash, no erythema present, left face and left foot ecchymosis present  Neurological/Psych: Alert and oriented to self, confused mood, no affect, poor judgement    Pertinent Laboratory/Diagnostic Studies have been independently reviewed  Current Medications     Current Medications Reviewed and updated in Nursing Home EMR  Assessment and Plan     Obstructive uropathy  · Araujo cath d/c 12/19/22  · Bladder scan performed this morning to r/o urinary retention r/t vasovagal incident this AM without BM  · Bladder scan showed patient with over 400 cc of urine and patient had dry brief per nursing staff    · 18 fr 10 cc araujo cath reinserted today  · UA C&S ordered due to dark/foul smelling urine  · Continue to monitor urinary output  · Continue araujo cath care  · Future voiding trial will be discussed with f/u Urology  · Continue to monitor patient for acute changes in condition    Syncope  · Nursing notes patient had syncopal episode in bathroom this morning  · This is second episode in 1 week  · Vital signs all with in normal range  · Patient seen today and reports feeling "okay, but tired"  · Continue to monitor patient for acute changes in condition    Pain and swelling of left ankle  · Noted bruising and swelling of left ankle with pain upon palpation  · Patient with recent fall at home, but no known recent falls  · Will order X-ray to r/o fracture    Tenderness of left calf  · Patient with swelling in LLE with bruising in left foot  · Patient with left calf tenderness upon palpation  · Will order LLE venous doppler to r/o DVT  · Continue to monitor patient for changes in condition      Michelle Sherman [address]  12/28/2022

## 2022-12-30 ENCOUNTER — TELEPHONE (OUTPATIENT)
Dept: UROLOGY | Facility: CLINIC | Age: 87
End: 2022-12-30

## 2022-12-30 NOTE — TELEPHONE ENCOUNTER
Called and spoke with Laura Padron as sukhdev is at [facility] and had catheter placed 12/13 after a fall 12/1  Scheduled her for appointment 1/10/23   Per Laura Padron they do have a bladder scan at facility
New Patient Triage  Please Triage:   New Patient-   What is the reason for the patients appointment?hospital follow up araujo catheter         Insurance: medicare  Do we accept the pt's insurance or is the patient Self-Pay? yes      History  Has the pt had any previous urologist? no    Have pt records been requested?  No    Has the pt had any outside testing done? no    Does the pt have a personal history of cancer?: no
no rhonchi/normal/airway patent/no rales/no wheezes/respirations non-labored

## 2022-12-31 ENCOUNTER — TELEPHONE (OUTPATIENT)
Dept: OTHER | Facility: OTHER | Age: 87
End: 2022-12-31

## 2022-12-31 NOTE — TELEPHONE ENCOUNTER
Karime Mccloud called stating that she needs to review the patients urine test results with the doctor  Paged on call Dr Aquilla Sacks via TC

## 2023-01-03 ENCOUNTER — NURSING HOME VISIT (OUTPATIENT)
Dept: GERIATRICS | Facility: OTHER | Age: 88
End: 2023-01-03

## 2023-01-03 DIAGNOSIS — N18.31 STAGE 3A CHRONIC KIDNEY DISEASE (HCC): ICD-10-CM

## 2023-01-03 DIAGNOSIS — E87.0 HYPERNATREMIA: ICD-10-CM

## 2023-01-03 DIAGNOSIS — Z71.89 GOALS OF CARE, COUNSELING/DISCUSSION: ICD-10-CM

## 2023-01-03 DIAGNOSIS — F03.C11 SEVERE DEMENTIA WITH AGITATION, UNSPECIFIED DEMENTIA TYPE: Primary | ICD-10-CM

## 2023-01-03 NOTE — ASSESSMENT & PLAN NOTE
Present during recent hospitalization but improved with IV fluids  Now 163  Discussed with family at length  Patient refusing PO intake, shakes her head and says "no"  Could try short course of IV fluids again but this would be temporary measure  Even if she allows for IV and does not pull it out, with her refusing to eat and drink this would not improve her quality of life or change the outcome     Planning to transition to hospice

## 2023-01-03 NOTE — ASSESSMENT & PLAN NOTE
Long discussion with POA nephew and his wife  Patient had recent fall with hospitalization and was admitted to [facility] for rehab  Since being here she is refusing to eat or drink and has lost 13 pounds  She has had 2 syncopal episodes while in the bathroom  Her  was also recently admitted here to [facility] for rehab  Family plan was to have them discharged to [facility] on palliative care  Discussed that patient is not a candidate for TESS  Reviewed labs with family  NA critical at 163  Family does not want her sent back to hospital  Goal per family is "to keep her comfortable"   Plan to transition to hospice

## 2023-01-03 NOTE — PROGRESS NOTES
[address]  [phone]) [address]  Code 31       NAME: Moises Higginbotham  AGE: 80 y o  SEX: female [id number]    DATE OF ENCOUNTER: 1/3/2023    CODE STATUS: DNR    Assessment and Plan     Problem List Items Addressed This Visit        Nervous and Auditory    Dementia ([facility] ) - Primary     End stage  Refusing PO intake  Labs show creat of 1 4 and NA of 163  Planning to transition to hospice            Genitourinary    Stage 3a chronic kidney disease ([facility] )     Lab Results   Component Value Date    EGFR 74 12/15/2022    EGFR 66 12/14/2022    EGFR 69 12/13/2022    CREATININE 0 66 12/15/2022    CREATININE 0 76 12/14/2022    CREATININE 0 73 12/13/2022     Acute on chronic kidney injury  Secondary to dehydration, patient refusing PO intake  Discussion with family, transition to hospice            Other    Hypernatremia     Present during recent hospitalization but improved with IV fluids  Now 163  Discussed with family at length  Patient refusing PO intake, shakes her head and says "no"  Could try short course of IV fluids again but this would be temporary measure  Even if she allows for IV and does not pull it out, with her refusing to eat and drink this would not improve her quality of life or change the outcome  Planning to transition to hospice         Goals of care, counseling/discussion     Long discussion with POA nephew and his wife  Patient had recent fall with hospitalization and was admitted to [facility] for rehab  Since being here she is refusing to eat or drink and has lost 13 pounds  She has had 2 syncopal episodes while in the bathroom  Her  was also recently admitted here to [facility] for rehab  Family plan was to have them discharged to [facility] on palliative care  Discussed that patient is not a candidate for TESS  Reviewed labs with family  NA critical at 163     Family does not want her sent back to hospital  Goal per family is "to keep her comfortable"  Plan to transition to hospice            No orders of the defined types were placed in this encounter  Chief Complaint     Chief Complaint   Patient presents with   • Geriatric Evaluation     Follow up       History of Present Illness   80year old female being seen today in collaboration with nursing  In person family meeting with nephew and his wife  Patient not eating and has lost 13 pounds  The following portions of the patient's history were reviewed and updated as appropriate: allergies, current medications, past family history, past medical history, past social history, past surgical history and problem list     Review of Systems   Review of Systems   Unable to perform ROS: Dementia          Active Problem List     Patient Active Problem List   Diagnosis   • Hypertension   • Benign colon polyp   • Osteopenia   • Snores   • Taste absent   • Vitamin D deficiency   • Fracture of multiple ribs of left side   • Ambulatory dysfunction   • Dementia ([facility] )   • History of 2019 novel coronavirus disease (COVID-19)   • Hypernatremia   • Oropharyngeal dysphagia   • Frailty syndrome in geriatric patient   • DNR (do not resuscitate)   • Obstructive uropathy   • James catheter in place on admission   • Glaucoma   • Kidney lesion, native, right   • Stage 3a chronic kidney disease ([address] )   • Syncope   • Pain and swelling of left ankle   • Tenderness of left calf   • Goals of care, counseling/discussion         Objective     There were no vitals taken for this visit  Physical Exam  Vitals reviewed  Constitutional:       General: She is not in acute distress  Appearance: She is not diaphoretic  Comments: Frail appearing   HENT:      Mouth/Throat:      Mouth: Mucous membranes are dry  Cardiovascular:      Comments:   Pulmonary:      Effort: Pulmonary effort is normal  No respiratory distress  Musculoskeletal:         General: No deformity     Skin: General: Skin is warm and dry  Findings: No bruising or erythema  Neurological:      Mental Status: She is disoriented  Pertinent Laboratory/Diagnostic Studies:    I have spent 45 minutes with Patient and family today in which greater than 50% of this time was spent in counseling/coordination of care regarding Prognosis  Current Medications   Medications reviewed and updated in facility chart

## 2023-01-03 NOTE — ASSESSMENT & PLAN NOTE
Lab Results   Component Value Date    EGFR 74 12/15/2022    EGFR 66 12/14/2022    EGFR 69 12/13/2022    CREATININE 0 66 12/15/2022    CREATININE 0 76 12/14/2022    CREATININE 0 73 12/13/2022     Acute on chronic kidney injury  Secondary to dehydration, patient refusing PO intake  Discussion with family, transition to hospice

## 2023-01-03 NOTE — ASSESSMENT & PLAN NOTE
End stage  Refusing PO intake  Labs show creat of 1 4 and NA of 163  Planning to transition to hospice

## 2023-12-07 ENCOUNTER — TELEPHONE (OUTPATIENT)
Dept: FAMILY MEDICINE CLINIC | Facility: CLINIC | Age: 88
End: 2023-12-07

## 2023-12-07 NOTE — TELEPHONE ENCOUNTER
Called Patient to schedule AWV, Patient is noted to have been  on January 10, 2023. As verified via obituary. Will send message to patient attribution to remove patient from Dr. Maureen Schwartz panel.

## 2023-12-07 NOTE — TELEPHONE ENCOUNTER
Call placed to patient's home , someone hung up. After further investigation of chart noted patient was on hospice back in January.  Looked up patient Prudy Sep online, verified that she has been  since January 10, 2203

## 2025-04-17 NOTE — PROGRESS NOTES
[address] [facility]  Progress Note - Mary Shorten 3/7/1926, 80 y o  female MRN: [id number]  Unit/Bed#: [facility] 604-01 Encounter: [id number]  Primary Care Provider: Sri Marcelino MD   Date and time admitted to hospital: 12/1/2022 11:28 AM    COVID  Assessment & Plan  O2 as needed  Isolation  Pulmonary toilet    Dementia [facility])  Assessment & Plan  Geriatrics consult - recs appreciated  Restraints secondary to impulsivity  Re- Directable, follows commands    Ambulatory dysfunction  Assessment & Plan  PT/OT seen patient  REhab is recommended    * Fracture of multiple ribs of left side  Assessment & Plan  Rib Fracture Protocol  Pain Control  Incentive spirometer  Mouth breather and dries out quickly, oral care frequently        Bowel Regimen: none  VTE Prophylaxis:Sequential compression device (Venodyne)  and Heparin     Disposition: rehab    Subjective   Chief Complaint: none    Subjective: " Hi"     Objective   Vitals:   Temp:  [98 6 °F (37 °C)-100 2 °F (37 9 °C)] 99 3 °F (37 4 °C)  HR:  [79-97] 79  Resp:  [16-20] 16  BP: (102-177)/() 138/66    I/O       12/02 0701  12/03 0700 12/03 0701  12/04 0700 12/04 0701  12/05 0700    P  O   0     I V  (mL/kg) 2015 (35 6)      Total Intake(mL/kg) 2015 (35 6) 0 (0)     Urine (mL/kg/hr) 120 (0 1)      Stool       Total Output 120      Net +1895 0            Unmeasured Urine Occurrence 3 x 3 x 1 x    Unmeasured Stool Occurrence 1 x 3 x 1 x           Physical Exam:   GENERAL APPEARANCE: a little anxious, easily re-directed  NEURO: GCS - 14  HEENT: EOm's intact, Left subconjunctival hemorrhage present  CV: RRR  LUNGS: Mouth breathing so some course sounds otherwise clear  GI: remains NPO, getting fluits, failed speech  : voiding  MSK: moves all extremities  SKIN: warm and dry    Invasive Devices     Peripheral Intravenous Line  Duration           Peripheral IV 12/01/22 Left;Ventral (anterior) Forearm 2 days          Drain  Duration           External Please review. Protocol Failed; No Protocol      I called the patient, he is taking Metformin 500mg 1 tabs in the AM and 2 in the PM - he looked at current pill bottle at home also to confirm.      Confirmed pharmacy he would like to fill this at is [facility], not at [facility].      Pended refill for [facility], please process accordingly and back to triage if questions/concerns.      Please review if refill is appropriate       Urinary Catheter 2 days                 PIC Score  PIC Pain Score: 3 (12/4/2022  4:00 AM)  PIC Incentive Spirometry Score: 1 (12/4/2022  4:00 AM)  PIC Cough Description: 2 (12/4/2022  4:00 AM)  PIC Total Score: 6 (12/4/2022  4:00 AM)       If the Total PIC Score </=5, did you consult APS and evaluate patient for further intervention?: no      Pain:    Incentive Spirometry  Cough  3 = Controlled  4 = Above goal volume 3 = Strong  2 = Moderate  3 = Goal to alert volume 2 = Weak  1 = Severe  2 = Below alert volume 1 = Absent     1 = Unable to perform IS         Lab Results: none  Imaging: none  Other Studies: none